# Patient Record
Sex: FEMALE | Race: BLACK OR AFRICAN AMERICAN | NOT HISPANIC OR LATINO | URBAN - METROPOLITAN AREA
[De-identification: names, ages, dates, MRNs, and addresses within clinical notes are randomized per-mention and may not be internally consistent; named-entity substitution may affect disease eponyms.]

---

## 2023-05-18 ENCOUNTER — APPOINTMENT (OUTPATIENT)
Dept: PRIMARY CARE | Facility: CLINIC | Age: 43
End: 2023-05-18
Payer: COMMERCIAL

## 2023-07-06 ENCOUNTER — OFFICE VISIT (OUTPATIENT)
Dept: PRIMARY CARE | Facility: CLINIC | Age: 43
End: 2023-07-06
Payer: COMMERCIAL

## 2023-07-06 VITALS
HEART RATE: 89 BPM | DIASTOLIC BLOOD PRESSURE: 87 MMHG | OXYGEN SATURATION: 99 % | HEIGHT: 66 IN | SYSTOLIC BLOOD PRESSURE: 126 MMHG | BODY MASS INDEX: 30.28 KG/M2 | TEMPERATURE: 97.8 F | WEIGHT: 188.4 LBS

## 2023-07-06 DIAGNOSIS — N93.9 VAGINAL BLEEDING: ICD-10-CM

## 2023-07-06 DIAGNOSIS — E11.65 TYPE 2 DIABETES MELLITUS WITH HYPERGLYCEMIA, WITH LONG-TERM CURRENT USE OF INSULIN (MULTI): ICD-10-CM

## 2023-07-06 DIAGNOSIS — I10 HYPERTENSION, UNSPECIFIED TYPE: ICD-10-CM

## 2023-07-06 DIAGNOSIS — Z79.4 TYPE 2 DIABETES MELLITUS WITH HYPERGLYCEMIA, WITH LONG-TERM CURRENT USE OF INSULIN (MULTI): ICD-10-CM

## 2023-07-06 DIAGNOSIS — Z13.5 DIABETIC RETINOPATHY SCREENING: Primary | ICD-10-CM

## 2023-07-06 DIAGNOSIS — K21.9 GASTROESOPHAGEAL REFLUX DISEASE WITHOUT ESOPHAGITIS: ICD-10-CM

## 2023-07-06 DIAGNOSIS — M17.0 PRIMARY OSTEOARTHRITIS OF BOTH KNEES: ICD-10-CM

## 2023-07-06 LAB
CLUE CELLS WET PREP: PRESENT
TRICH WET PREP: ABNORMAL
TRICHOMONAS REFLEX COMMENT: ABNORMAL
WBC WET PREP: ABNORMAL /HPF
YEAST PRESENCE WET PREP: ABNORMAL

## 2023-07-06 PROCEDURE — 3066F NEPHROPATHY DOC TX: CPT

## 2023-07-06 PROCEDURE — 3079F DIAST BP 80-89 MM HG: CPT

## 2023-07-06 PROCEDURE — 3074F SYST BP LT 130 MM HG: CPT

## 2023-07-06 PROCEDURE — 1036F TOBACCO NON-USER: CPT

## 2023-07-06 PROCEDURE — 3046F HEMOGLOBIN A1C LEVEL >9.0%: CPT

## 2023-07-06 PROCEDURE — 3008F BODY MASS INDEX DOCD: CPT

## 2023-07-06 PROCEDURE — 99214 OFFICE O/P EST MOD 30 MIN: CPT

## 2023-07-06 PROCEDURE — 87210 SMEAR WET MOUNT SALINE/INK: CPT

## 2023-07-06 RX ORDER — INSULIN GLARGINE 100 [IU]/ML
INJECTION, SOLUTION SUBCUTANEOUS
Qty: 3 ML | Refills: 11 | Status: SHIPPED | OUTPATIENT
Start: 2023-07-06 | End: 2024-03-26 | Stop reason: SDUPTHER

## 2023-07-06 RX ORDER — NAPROXEN SODIUM 220 MG/1
TABLET, FILM COATED ORAL
COMMUNITY
End: 2023-07-06 | Stop reason: SDUPTHER

## 2023-07-06 RX ORDER — L. ACIDOPHILUS/L.BULGARICUS 1MM CELL
TABLET ORAL
Qty: 30 TABLET | Refills: 11 | OUTPATIENT
Start: 2023-07-06 | End: 2024-05-07

## 2023-07-06 RX ORDER — PEN NEEDLE, DIABETIC 30 GX3/16"
NEEDLE, DISPOSABLE MISCELLANEOUS
Qty: 100 EACH | Refills: 11 | Status: SHIPPED | OUTPATIENT
Start: 2023-07-06 | End: 2024-03-26 | Stop reason: SDUPTHER

## 2023-07-06 RX ORDER — ACETAMINOPHEN 325 MG/1
325 TABLET ORAL
Qty: 30 TABLET | Refills: 2 | Status: SHIPPED | OUTPATIENT
Start: 2023-07-06

## 2023-07-06 RX ORDER — UBIQUINOL 100 MG
CAPSULE ORAL
Qty: 100 EACH | Refills: 11 | Status: SHIPPED | OUTPATIENT
Start: 2023-07-06 | End: 2024-03-26 | Stop reason: SDUPTHER

## 2023-07-06 RX ORDER — DULAGLUTIDE 1.5 MG/.5ML
1.5 INJECTION, SOLUTION SUBCUTANEOUS
Qty: 2 ML | Refills: 11 | Status: SHIPPED | OUTPATIENT
Start: 2023-07-06 | End: 2024-03-26 | Stop reason: SDUPTHER

## 2023-07-06 RX ORDER — UBIQUINOL 100 MG
CAPSULE ORAL
COMMUNITY
Start: 2023-06-19 | End: 2023-07-06 | Stop reason: SDUPTHER

## 2023-07-06 RX ORDER — AMLODIPINE BESYLATE 10 MG/1
10 TABLET ORAL
COMMUNITY
Start: 2023-05-21 | End: 2023-07-06 | Stop reason: SDUPTHER

## 2023-07-06 RX ORDER — ACETAMINOPHEN 325 MG/1
TABLET ORAL
COMMUNITY
Start: 2019-06-05 | End: 2023-07-06 | Stop reason: SDUPTHER

## 2023-07-06 RX ORDER — HYDROCHLOROTHIAZIDE 12.5 MG/1
12.5 TABLET ORAL DAILY
Qty: 30 TABLET | Refills: 11 | Status: SHIPPED | OUTPATIENT
Start: 2023-07-06 | End: 2024-03-26 | Stop reason: SDUPTHER

## 2023-07-06 RX ORDER — ATORVASTATIN CALCIUM 80 MG/1
80 TABLET, FILM COATED ORAL NIGHTLY
Qty: 90 TABLET | Refills: 3 | Status: SHIPPED | OUTPATIENT
Start: 2023-07-06 | End: 2023-07-21 | Stop reason: SDUPTHER

## 2023-07-06 RX ORDER — ATORVASTATIN CALCIUM 80 MG/1
80 TABLET, FILM COATED ORAL NIGHTLY
COMMUNITY
End: 2023-07-06 | Stop reason: SDUPTHER

## 2023-07-06 RX ORDER — PANTOPRAZOLE SODIUM 40 MG/1
40 TABLET, DELAYED RELEASE ORAL DAILY
Qty: 30 TABLET | Refills: 5 | OUTPATIENT
Start: 2023-07-06 | End: 2024-05-07

## 2023-07-06 RX ORDER — INSULIN GLARGINE 100 [IU]/ML
INJECTION, SOLUTION SUBCUTANEOUS
COMMUNITY
End: 2023-07-06 | Stop reason: SDUPTHER

## 2023-07-06 RX ORDER — METFORMIN HYDROCHLORIDE 500 MG/1
500 TABLET ORAL
Qty: 60 TABLET | Refills: 11 | Status: SHIPPED | OUTPATIENT
Start: 2023-07-06 | End: 2024-03-26 | Stop reason: SDUPTHER

## 2023-07-06 RX ORDER — HYDROCHLOROTHIAZIDE 12.5 MG/1
12.5 TABLET ORAL DAILY
COMMUNITY
End: 2023-07-06 | Stop reason: SDUPTHER

## 2023-07-06 RX ORDER — INSULIN LISPRO 100 [IU]/ML
INJECTION, SOLUTION INTRAVENOUS; SUBCUTANEOUS
COMMUNITY
End: 2023-07-06 | Stop reason: WASHOUT

## 2023-07-06 RX ORDER — L. ACIDOPHILUS/L.BULGARICUS 1MM CELL
TABLET ORAL
COMMUNITY
Start: 2023-05-21 | End: 2023-07-06 | Stop reason: SDUPTHER

## 2023-07-06 RX ORDER — AMLODIPINE BESYLATE 10 MG/1
10 TABLET ORAL
Qty: 30 TABLET | Refills: 11 | Status: SHIPPED | OUTPATIENT
Start: 2023-07-06 | End: 2024-03-26 | Stop reason: SDUPTHER

## 2023-07-06 RX ORDER — BLOOD-GLUCOSE SENSOR
EACH MISCELLANEOUS
Status: CANCELLED | OUTPATIENT
Start: 2023-07-06

## 2023-07-06 RX ORDER — ACETAMINOPHEN 325 MG/1
325 TABLET ORAL
Qty: 30 TABLET | Refills: 2 | Status: SHIPPED | OUTPATIENT
Start: 2023-07-06 | End: 2023-07-06 | Stop reason: SDUPTHER

## 2023-07-06 RX ORDER — NAPROXEN SODIUM 220 MG/1
TABLET, FILM COATED ORAL
Qty: 30 TABLET | Refills: 11 | Status: SHIPPED | OUTPATIENT
Start: 2023-07-06 | End: 2024-03-26 | Stop reason: SDUPTHER

## 2023-07-06 ASSESSMENT — ENCOUNTER SYMPTOMS
ACTIVITY CHANGE: 0
NAUSEA: 0
LIGHT-HEADEDNESS: 0
DIARRHEA: 0
HEMATURIA: 0
FREQUENCY: 0
FEVER: 0
GASTROINTESTINAL NEGATIVE: 1
DIZZINESS: 0
DYSURIA: 0

## 2023-07-06 ASSESSMENT — PAIN SCALES - GENERAL: PAINLEVEL: 0-NO PAIN

## 2023-07-06 NOTE — PROGRESS NOTES
"Subjective   Patient ID: Tracey Stephen is a 42 y.o. female who presents for Follow-up (Stroke), Vaginal Bleeding (Doesn't have Ovaries), and Med Refill (Wants a Dex com G7 for Blood sugar and Review meds).         # Hospital Follow Up  Discharge summary:    \"TRACEY STEPHEN is a 42 year old Female with PMH significant for HTN, uncontrolled T2DM on insulin, CKD, former smoker, recent CVA with mild residual left sided weakness (3 weeks ago at [place]) who presented to the ER with c/o right sided weakness 30 min prior to coming to the ER.  Denies any other associated symptoms.  Denies HA, lightheadedness/dizziness, fever, chills, nausea, vomiting, CP, palpitations, cough, wheezing, SOB, GOFF, orthopnea, abdominal pain, urinary complaints, diarrhea, constipation  Endorses she had left sided weakness, difficulty walking, and slurred speech 3 weeks ago, and was evaluated at [facility] and found to have an acute stroke. Since then she has quit smoking (prior 1.5 ppd for > 20 years).  Admits she has not been taking lantus, and report she needs a new glucometer.  Reports she did have an ECHO done at [place] 3 weeks ago, will need to reach out to get the records.  Currently is on continuos cardiac monitor until 7/27/23 she was ruled out for stroke, in regards to patient's hyperglycemia increase her Lantus to 20 units twice a day, encouraged compliance.  Patient was stable at the time of discharge.\"    Hospital Follow up  - admitted at 6/14-6/20   - since discharge, well HH and rehab  - reports minimal missed medication doses and needs refill on all discharge medication list.      #Vaginal Bleeding   -sp hysterectomy 2009  -daily brown discharge for a \"year\"   -Evaluated at [facility] for vaginal bleeding and treated for trichamonas,   -abnormal brown discharge continued  -denies malodorous, irritation, lesions, use of feminine hygiene sprays, pruritus      SH:   Tobacco: Former smoker, quit 2 months ago. (14 " "ppd)  Denies alcohol use  Cannabis 4-5x per day      Review of Systems   Constitutional:  Negative for activity change and fever.   HENT:  Negative for congestion.    Gastrointestinal: Negative.  Negative for diarrhea and nausea.   Genitourinary:  Positive for vaginal bleeding. Negative for dysuria, frequency and hematuria.   Neurological:  Negative for dizziness and light-headedness.       Objective   /87 (BP Location: Left arm, Patient Position: Sitting, BP Cuff Size: Adult)   Pulse 89   Temp 36.6 °C (97.8 °F) (Temporal)   Ht 1.664 m (5' 5.5\")   Wt 85.5 kg (188 lb 6.4 oz)   SpO2 99%   BMI 30.87 kg/m²     Physical Exam  Constitutional:       General: She is not in acute distress.     Appearance: She is obese.      Comments: In wheel chair   HENT:      Head: Normocephalic and atraumatic.      Nose: Nose normal.   Eyes:      Extraocular Movements: Extraocular movements intact.      Conjunctiva/sclera: Conjunctivae normal.   Cardiovascular:      Rate and Rhythm: Normal rate and regular rhythm.   Pulmonary:      Effort: Pulmonary effort is normal.   Abdominal:      General: Bowel sounds are normal. There is no distension.      Palpations: Abdomen is soft.      Tenderness: There is no abdominal tenderness.   Skin:     General: Skin is warm and dry.      Capillary Refill: Capillary refill takes 2 to 3 seconds.   Neurological:      General: No focal deficit present.      Mental Status: She is alert.   Psychiatric:         Mood and Affect: Mood normal.         Behavior: Behavior normal.         Assessment/Plan     JAKY MILLAN is a 42 year old Female with PMH significant for HTN, uncontrolled T2DM on insulin, CKD, former smoker, recent CVA with mild residual left sided weakness (3 weeks ago at [place]) who presented for hospital follow up, medication refill, referrals, and complaint of abnormal vaginal brown bleeding/discharge for over 1 year. Patient has been doing well since discharge from the hospital. " "Most of appointment was spent refilling all of her medications because she was out. Her complaint of vaginal discharge s/p hysterectomy in 2009 has been evaluated by  OB/GYN in 1/2023 and [facility] 5/31/23 for which, through chart review patient positive for BV (Neg GC). Obtained wet prep swab IO and ordered TVUS. Ddx includes: vaginal atrophy, infection, vaginal cancer, fistula? Patient to follow up in 3-4 weeks to follow up on symptoms, lab result, and image.    Diagnoses and all orders for this visit:  Diabetic retinopathy screening  -     Referral to Ophthalmology; Future  Type 2 diabetes mellitus with hyperglycemia, with long-term current use of insulin (CMS/[facility])  -     Referral to Podiatry; Future  -     Alcohol Prep Pads pads, medicated; USE TOPICALLY PRIOR TO TESTING OR INJECTING INSULIN.  -     Continuous glucose monitoring  -     atorvastatin (Lipitor) 80 mg tablet; Take 1 tablet (80 mg) by mouth once daily at bedtime.  -     Basaglar KwikPen U-100 Insulin 100 unit/mL (3 mL) pen; 20 UNIT(S) SUBCUTANEOUS 2 TIMES A DAY  -     pen needle, diabetic 31 gauge x 5/16\" needle; Use to inject 1-4 times daily as directed.  -     blood sugar diagnostic strip; 2 (two) times daily before a meal. Give strips corresponding to meter given.  -     metFORMIN (Glucophage) 500 mg tablet; Take 1 tablet (500 mg) by mouth 2 times a day with meals.  -     dulaglutide (Trulicity) 1.5 mg/0.5 mL pen injector; Inject 1.5 mg under the skin 1 (one) time per week.  Vaginal bleeding  -     Wet Prep with Trichomonas Reflex If Neg; Future  -     US pelvis transvaginal; Future  -    Perform spec exam if indicated. Unable to perform this visit d/t time constraints.   Hypertension, unspecified type  -     amLODIPine (Norvasc) 10 mg tablet; Take 1 tablet (10 mg) by mouth once daily.  -     aspirin 81 mg chewable tablet; TAKE 1 TABLET BY ORAL/FEEDING TUBE ROUTE ONCE DAILY.  -     hydroCHLOROthiazide (HYDRODiuril) 12.5 mg tablet; Take 1 tablet (12.5 " mg) by mouth once daily.  Gastroesophageal reflux disease without esophagitis  -     Floranex 1 million cell tablet tablet; USE AS DIRECTED.  -     pantoprazole (ProtoNix) 40 mg EC tablet; Take 1 tablet (40 mg) by mouth once daily. Do not crush, chew, or split.  Primary osteoarthritis of both knees  -     acetaminophen (Tylenol) 325 mg tablet; Take 1 tablet (325 mg) by mouth every 4 hours.      RTC in 3-4 weeks for follow up vaginal bleeding     Discussed and evaluated with Dr. Ani Fernandez,  PGY2

## 2023-07-07 DIAGNOSIS — N76.0 BACTERIAL VAGINAL INFECTION: Primary | ICD-10-CM

## 2023-07-07 DIAGNOSIS — B37.31 YEAST VAGINITIS: ICD-10-CM

## 2023-07-07 DIAGNOSIS — B96.89 BACTERIAL VAGINAL INFECTION: Primary | ICD-10-CM

## 2023-07-07 LAB — TRICHOMONAS VAGINALIS: NORMAL

## 2023-07-07 RX ORDER — METRONIDAZOLE 500 MG/1
500 TABLET ORAL 2 TIMES DAILY
Qty: 14 TABLET | Refills: 0 | Status: SHIPPED | OUTPATIENT
Start: 2023-07-07 | End: 2023-07-14

## 2023-07-07 RX ORDER — FLUCONAZOLE 150 MG/1
150 TABLET ORAL ONCE
Qty: 1 TABLET | Refills: 0 | Status: SHIPPED | OUTPATIENT
Start: 2023-07-07 | End: 2023-07-07

## 2023-07-07 NOTE — PROGRESS NOTES
I saw and evaluated the patient. I personally obtained the key and critical portions of the history and physical exam or was physically present for key and critical portions performed by the resident/fellow. I reviewed the resident/fellow's documentation and discussed the patient with the resident/fellow. I agree with the resident/fellow's medical decision making as documented in the note.    Mahendra Law MD

## 2023-07-07 NOTE — PROGRESS NOTES
Called patient 7/7/2023 regarding lab value indicated of BV. Patient understands Metronidazole 500 mg twice daily for 7 days will be sent to pharmacy. Patient request treatment for yeast infection since happens when she takes antibiotics. Ordered 1 tablet of fluconazole 150 mg. Patient to call clinic to schedule f/up appt after TVUS.

## 2023-07-21 ENCOUNTER — OFFICE VISIT (OUTPATIENT)
Dept: PRIMARY CARE | Facility: CLINIC | Age: 43
End: 2023-07-21
Payer: COMMERCIAL

## 2023-07-21 VITALS
OXYGEN SATURATION: 99 % | HEIGHT: 65 IN | HEART RATE: 98 BPM | TEMPERATURE: 95.6 F | RESPIRATION RATE: 18 BRPM | SYSTOLIC BLOOD PRESSURE: 123 MMHG | WEIGHT: 192 LBS | DIASTOLIC BLOOD PRESSURE: 85 MMHG | BODY MASS INDEX: 31.99 KG/M2

## 2023-07-21 DIAGNOSIS — I10 HYPERTENSION, UNSPECIFIED TYPE: ICD-10-CM

## 2023-07-21 DIAGNOSIS — Z79.4 TYPE 2 DIABETES MELLITUS WITH HYPERGLYCEMIA, WITH LONG-TERM CURRENT USE OF INSULIN (MULTI): Primary | ICD-10-CM

## 2023-07-21 DIAGNOSIS — L85.3 XEROSIS OF SKIN: ICD-10-CM

## 2023-07-21 DIAGNOSIS — E11.65 TYPE 2 DIABETES MELLITUS WITH HYPERGLYCEMIA, WITH LONG-TERM CURRENT USE OF INSULIN (MULTI): Primary | ICD-10-CM

## 2023-07-21 PROCEDURE — 3079F DIAST BP 80-89 MM HG: CPT

## 2023-07-21 PROCEDURE — 3066F NEPHROPATHY DOC TX: CPT

## 2023-07-21 PROCEDURE — 99213 OFFICE O/P EST LOW 20 MIN: CPT

## 2023-07-21 PROCEDURE — 1036F TOBACCO NON-USER: CPT

## 2023-07-21 PROCEDURE — 3046F HEMOGLOBIN A1C LEVEL >9.0%: CPT

## 2023-07-21 PROCEDURE — 3074F SYST BP LT 130 MM HG: CPT

## 2023-07-21 PROCEDURE — 3008F BODY MASS INDEX DOCD: CPT

## 2023-07-21 RX ORDER — ATORVASTATIN CALCIUM 80 MG/1
80 TABLET, FILM COATED ORAL NIGHTLY
Qty: 90 TABLET | Refills: 3 | Status: SHIPPED | OUTPATIENT
Start: 2023-07-21 | End: 2024-03-26 | Stop reason: SDUPTHER

## 2023-07-21 ASSESSMENT — ENCOUNTER SYMPTOMS
CONSTIPATION: 0
PALPITATIONS: 0
COUGH: 0
CHILLS: 0
DIARRHEA: 0
RHINORRHEA: 0
NAUSEA: 0
HEADACHES: 0
WOUND: 0
DIZZINESS: 0
FEVER: 0
LIGHT-HEADEDNESS: 0
SHORTNESS OF BREATH: 0

## 2023-07-21 ASSESSMENT — PAIN SCALES - GENERAL: PAINLEVEL: 0-NO PAIN

## 2023-07-21 NOTE — PROGRESS NOTES
"Subjective   Patient ID: Tracey Stephen is a 42 y.o. female who presents for Diabetes (Itching all over ).    HPI   # Diabetes  - Interval hx:   - Current regimen: Metformin 500 mg BID, Basaglar Kwikpen 20 U twice a day, Trulicy once 1.5 mg every Friday. Next Endo Aug 14th  - Injection: Abdomen. Alternating sites of injection  - Home Glucose Monitoring: TID per day  - Home Glucose ranges: 202, 147, 239, 141, 134  - Hypoglycemic episodes: denies  - Diet: reduced carb intake, need to get on SNAP  - Diabetes Surveillance: last HbA1C   Lab Results   Component Value Date    HGBA1C 13.8 (A) 06/15/2023     - Diabetes Complications:   - Vaccinations:   - Unrine Albumin:   - Foot Exam: has not seen a podiatrist  - Eye Exam:   -Mouth Exam:   - Denies any polyuria, polydipsia, dysuria, fatigue, weight loss, changes in vision, changes in bowels, HA dizziness, lightheadedness, or weakness.    141/79, 125/85, 120/87    #Itchy Skin  -whole body 2 months   -no rash  -no changes in body wash or detergent    Review of Systems   Constitutional:  Negative for chills and fever.   HENT:  Negative for rhinorrhea and sneezing.    Respiratory:  Negative for cough and shortness of breath.    Cardiovascular:  Negative for chest pain and palpitations.   Gastrointestinal:  Negative for constipation, diarrhea and nausea.   Skin:  Negative for rash and wound.   Allergic/Immunologic: Negative for environmental allergies and food allergies.   Neurological:  Negative for dizziness, light-headedness and headaches.       Objective   /85 (BP Location: Right arm, Patient Position: Sitting, BP Cuff Size: Adult)   Pulse 98   Temp 35.3 °C (95.6 °F) (Temporal)   Resp 18   Ht 1.651 m (5' 5\")   Wt 87.1 kg (192 lb)   SpO2 99%   BMI 31.95 kg/m²     Physical Exam  Constitutional:       General: She is not in acute distress.     Appearance: She is obese. She is not toxic-appearing.   HENT:      Head: Normocephalic and atraumatic.      Nose: Nose " normal.      Mouth/Throat:      Mouth: Mucous membranes are moist.   Eyes:      Extraocular Movements: Extraocular movements intact.      Conjunctiva/sclera: Conjunctivae normal.   Cardiovascular:      Rate and Rhythm: Normal rate and regular rhythm.      Heart sounds: No murmur heard.  Pulmonary:      Effort: Pulmonary effort is normal.      Breath sounds: Normal breath sounds. No wheezing or rhonchi.   Abdominal:      General: Bowel sounds are normal. There is no distension.      Palpations: Abdomen is soft.      Tenderness: There is no abdominal tenderness.   Skin:     General: Skin is warm and dry.      Coloration: Skin is not jaundiced.      Findings: No erythema, lesion or rash.   Neurological:      Mental Status: She is alert.   Psychiatric:         Mood and Affect: Mood normal.         Behavior: Behavior normal.          Assessment/Plan   Patient is a 42 year old Female with PMH significant for HTN, uncontrolled T2DM on insulin, CKD, former smoker, recent CVA with mild residual left sided weakness ( [place]) who presented for follow up on DM, HTN, and dry skin for two weeks. Patient has complex medical history and on many medications. She reports adherence but upon interview, there was some confusion on her medications. For instance, on discharge medication paperwork from last visit did not list short acting insulin, but notes she recent found another insulin in refrigerator that she was suppose to take before meals, but has not. Her sugars are not at goal. Additionally, she was taking Chlorthalidone 25mg daily and hydrochlorothiazide 12.5 daily. Not sure if it was discontinued and started on hydrochlorothiazide from last hospital stay. Patient to be evaluated by Endocrinology next month. Patient to follow up at the  in month as well to follow up on DM and HTN.      Diagnoses and all orders for this visit:  Type 2 diabetes mellitus with hyperglycemia, with long-term current use of insulin (CMS/[facility])  -      Referral to Podiatry; Future  -     Referral to Ophthalmology; Future  Hypertension, unspecified type  -     atorvastatin (Lipitor) 80 mg tablet; Take 1 tablet (80 mg) by mouth once daily at bedtime.  Xerosis of the skin        - upon PE, dry skin. No inflammation, rash, excoriation, macules/plaques         - CMP on 6/14/23 total bili wnl       -  discussed avoidance of skin irritants and fragrance free moisturizers     RTC in 1 month to follow up on DM and HTN    D/w Dr. José Miguel Fernandez, DO PGY2

## 2023-07-21 NOTE — PATIENT INSTRUCTIONS
It was so great to see you today Tracey Stephen  . Today we discussed:    -make an appointment with podiatry and eye doctor, call number listed below  -Follow up with Endocrinology  -Stop taking Chlorthalidone  -Follow up in one month for blood pressure check     Call [phone]  For Care if you need to schedule appointment with specialist or images.    Follow up in       You were see by:    Dr. Kecia Fernandez D.O.  [facility]   Phone: [phone]

## 2023-07-28 NOTE — PROGRESS NOTES
I reviewed with the resident the medical history and the resident’s findings on physical examination.  I discussed with the resident the patient’s diagnosis and concur with the treatment plan as documented in the resident note.     Rachel Valdez MD

## 2023-08-31 ENCOUNTER — OFFICE VISIT (OUTPATIENT)
Dept: PRIMARY CARE | Facility: CLINIC | Age: 43
End: 2023-08-31
Payer: COMMERCIAL

## 2023-08-31 VITALS
SYSTOLIC BLOOD PRESSURE: 159 MMHG | WEIGHT: 190.6 LBS | RESPIRATION RATE: 18 BRPM | HEIGHT: 65 IN | BODY MASS INDEX: 31.75 KG/M2 | DIASTOLIC BLOOD PRESSURE: 99 MMHG | OXYGEN SATURATION: 96 % | TEMPERATURE: 98.1 F | HEART RATE: 98 BPM

## 2023-08-31 DIAGNOSIS — J30.81 CAT ALLERGY, AIRBORNE: Primary | ICD-10-CM

## 2023-08-31 DIAGNOSIS — Z79.4 TYPE 2 DIABETES MELLITUS WITH HYPERGLYCEMIA, WITH LONG-TERM CURRENT USE OF INSULIN (MULTI): ICD-10-CM

## 2023-08-31 DIAGNOSIS — E11.65 TYPE 2 DIABETES MELLITUS WITH HYPERGLYCEMIA, WITH LONG-TERM CURRENT USE OF INSULIN (MULTI): ICD-10-CM

## 2023-08-31 DIAGNOSIS — I63.9 CEREBROVASCULAR ACCIDENT (CVA), UNSPECIFIED MECHANISM (MULTI): ICD-10-CM

## 2023-08-31 PROCEDURE — 3066F NEPHROPATHY DOC TX: CPT

## 2023-08-31 PROCEDURE — 3046F HEMOGLOBIN A1C LEVEL >9.0%: CPT

## 2023-08-31 PROCEDURE — 3077F SYST BP >= 140 MM HG: CPT

## 2023-08-31 PROCEDURE — 3080F DIAST BP >= 90 MM HG: CPT

## 2023-08-31 PROCEDURE — 1036F TOBACCO NON-USER: CPT

## 2023-08-31 PROCEDURE — 99214 OFFICE O/P EST MOD 30 MIN: CPT

## 2023-08-31 PROCEDURE — 3008F BODY MASS INDEX DOCD: CPT

## 2023-08-31 RX ORDER — LORATADINE 10 MG/1
10 TABLET ORAL DAILY
Qty: 30 TABLET | Refills: 5 | Status: SHIPPED | OUTPATIENT
Start: 2023-08-31 | End: 2024-02-27

## 2023-08-31 RX ORDER — BLOOD SUGAR DIAGNOSTIC
3 STRIP MISCELLANEOUS
Qty: 100 STRIP | Refills: 11 | Status: SHIPPED | OUTPATIENT
Start: 2023-08-31 | End: 2024-03-26 | Stop reason: SDUPTHER

## 2023-08-31 ASSESSMENT — PAIN SCALES - GENERAL: PAINLEVEL: 8

## 2023-08-31 NOTE — PATIENT INSTRUCTIONS
It was so great to see you today Tracey Stephen  . Today we discussed:    -signed Social Security paperwork and gave you a copy  -Ordered continued speech evaluation for history of stroke and residual facial weakness   -  -    Call [phone]  For Care if you need to schedule appointment with specialist or images.    Follow up in       You were see by:    Dr. Kecia Fernandez D.O.  [facility]   Phone: [phone]

## 2023-08-31 NOTE — PROGRESS NOTES
Subjective   Patient ID: Tracey Stephen is a 42 y.o. female who presents for follow on Abnormal Vaginal bleeding and the follow concerns listed below:     HPI     #Abnormal Vaginal Bleeding  -daily brown/red vaginal bleeding for 1yr. Wears 1 pad daily.   -s/p partial hysterectomy in 2009  -evaluated by GYN Dr. Hayley Cedillo at [facility] for which pelvic US obtained. Per PE PELVIC from OSH: external genitalia normal, normal Barth[place]'s glands, urethra, [place]'s glands, no vulvar lesions, good vaginal support, normal appearing perineal body and perianal region, well estrogenized, cervix surgically absent, vaginal discharge   -denies hematuria, malorderous vaginal discharge, pelvic pain  - exam normal, STD screenings unremarkable. S/p BV treatment  -CT Abd/pelvis requested addendum regarding female anatomy 8/3/23: Noted hysterectomy, Tiny dystrophic calcifications are seen near vaginal region similar to prior. Small symmetric soft tissue structures of adnexal regions likely represent to non enlarged ovaries, similar to prior. No adnexal mass or fluid collection identified.   -US pelvis US 6/16/23: ovaries unable to be visualized d/t overlying bowel gas. Noted surgically absent uterus and no pelvic free fluid.   -Causes distress because of full workup between  and [facility] w/o diagnosis as well as it effects patient's desire for sexual intercourse. Not sexually active at this time  - request Birth Control to see if it will stop vaginal bleeding    #H/o CVA with mild left side weakness  -patient request Speech Therapy renewed referral for continue therapy. Expresses continued left sided facial weakness      #Misc  -Patient request to complete Social Security Paperwork      Review of Systems   Constitutional:  Negative for appetite change, chills, fever and unexpected weight change.   HENT:  Negative for congestion, ear discharge, rhinorrhea and sneezing.    Eyes:  Negative for discharge, itching and visual  "disturbance.   Respiratory:  Negative for cough, shortness of breath and wheezing.    Cardiovascular:  Negative for chest pain, palpitations and leg swelling.   Gastrointestinal:  Negative for abdominal pain, constipation, diarrhea, nausea and vomiting.   Endocrine: Negative for cold intolerance and heat intolerance.   Genitourinary:  Positive for vaginal bleeding. Negative for dysuria, frequency, hematuria, urgency, vaginal discharge and vaginal pain.   Musculoskeletal:  Negative for joint swelling and myalgias.   Skin:  Negative for rash and wound.   Neurological:  Negative for dizziness, light-headedness and headaches.   Psychiatric/Behavioral:  Negative for confusion, sleep disturbance and suicidal ideas.        Objective   BP (!) 159/99 (BP Location: Right arm, Patient Position: Sitting, BP Cuff Size: Adult)   Pulse 98   Temp 36.7 °C (98.1 °F) (Temporal)   Resp 18   Ht 1.651 m (5' 5\")   Wt 86.5 kg (190 lb 9.6 oz)   SpO2 96%   BMI 31.72 kg/m²     Physical Exam  Constitutional:       General: She is not in acute distress.     Appearance: She is obese. She is not ill-appearing.   HENT:      Head: Normocephalic and atraumatic.      Nose: Nose normal.   Eyes:      Extraocular Movements: Extraocular movements intact.      Conjunctiva/sclera: Conjunctivae normal.   Cardiovascular:      Rate and Rhythm: Normal rate and regular rhythm.      Heart sounds: No murmur heard.  Pulmonary:      Effort: Pulmonary effort is normal.      Breath sounds: Normal breath sounds.   Abdominal:      General: Bowel sounds are normal. There is no distension.      Palpations: Abdomen is soft. There is no mass.      Tenderness: There is no abdominal tenderness.      Hernia: No hernia is present.   Musculoskeletal:         General: Normal range of motion.   Skin:     General: Skin is warm and dry.   Neurological:      General: No focal deficit present.      Mental Status: She is alert and oriented to person, place, and time. " "  Psychiatric:         Mood and Affect: Mood normal.         Behavior: Behavior normal.         Assessment/Plan   JAKY MILLAN is a 42 year old Female with PMH significant for HTN, uncontrolled T2DM on insulin, CKD, former smoker, recent CVA with mild residual left sided weakness, who presented for continued abnormal vaginal bleeding for 1 year duration. Has been evaluated by GYN at [facility] as well as at  for complaint. Patient s/p partial hysterectomy in 2009.  physical exam per chart biopsy by GYN Dr. Mamadou CAMARENA on 5/31/23 negative and images (TVUS & CT) negative. No clear picture at this point and patient has an upcoming visit with  GYN for evaluation, may need laparoscopic view of abd/pelvis. Ddx atrophic vaginitis, bleeding disorder, PCOS. Patient requesting birth control for vaginal bleeding since she desires sexual intercourse. Has not been sexually active for longtime. Discussed with patient that since etiology unknown for vaginal bleeding, did not have a clear indication for birth control Rx,  low success for desired results, and her risk factors for AE of OCPs (age/smoking history/HTN/CVA).     Diagnoses and all orders for this visit:  Abnormal Vaginal Bleeding       -s/p partial hysterectomy in 2009       -Continued daily brown/red vaginal bleeding for 1yr. Wears 1 pad daily.        -evaluated by GYN Dr. Hayley Cedillo at [facility]         --Per PE PELVIC from OSH: \"external genitalia normal, normal Bartholin's glands, urethra, [place]'s glands, no vulvar lesions, good vaginal support, normal appearing perineal body and perianal region, well estrogenized, cervix surgically absent, vaginal discharge\"        -denies hematuria, malorderous vaginal discharge, pelvic pain, pruritus       - STD screenings unremarkable. S/p BV treatment      -CBC & TSH wnl      -CT Abd/pelvis requested addendum regarding female anatomy 8/3/23: Noted hysterectomy, Tiny dystrophic calcifications are seen " near vaginal region similar to prior. Small symmetric soft tissue structures of adnexal regions likely represent to non enlarged ovaries, similar to prior. No adnexal mass or fluid collection identified.       -US pelvis US 6/16/23: ovaries unable to be visualized d/t overlying bowel gas. Noted surgically absent uterus and no pelvic free fluid.       -Encouraged [facility] GYN appointment scheduled in two months for evaluation. Did not provide contraceptives d/t unsure nature of vaginal bleeding  Cat allergy, airborne  -     loratadine (Claritin) 10 mg tablet; Take 1 tablet (10 mg) by mouth once daily.  Cerebrovascular accident (CVA), unspecified mechanism (CMS/HCC)  -     Referral to Speech Therapy; Future  Type 2 diabetes mellitus with hyperglycemia, with long-term current use of insulin (CMS/HCC)  -     OneTouch Ultra Test strip; 3 each before breakfast, before lunch, before evening meal, and at bedtime.     Discussed and evaluated with Dr. Héctor Fernandez, DO PGY2

## 2023-09-01 ASSESSMENT — ENCOUNTER SYMPTOMS
PALPITATIONS: 0
DIARRHEA: 0
MYALGIAS: 0
JOINT SWELLING: 0
EYE DISCHARGE: 0
CHILLS: 0
LIGHT-HEADEDNESS: 0
RHINORRHEA: 0
DYSURIA: 0
COUGH: 0
DIZZINESS: 0
CONSTIPATION: 0
SHORTNESS OF BREATH: 0
SLEEP DISTURBANCE: 0
UNEXPECTED WEIGHT CHANGE: 0
NAUSEA: 0
WHEEZING: 0
VOMITING: 0
APPETITE CHANGE: 0
ABDOMINAL PAIN: 0
CONFUSION: 0
HEMATURIA: 0
FREQUENCY: 0
WOUND: 0
EYE ITCHING: 0
FEVER: 0
HEADACHES: 0

## 2023-09-03 NOTE — PROGRESS NOTES
I saw and evaluated the patient. I personally obtained the key and critical portions of the history and physical exam or was physically present for key and critical portions performed by the resident/fellow. I reviewed the resident/fellow's documentation and discussed the patient with the resident/fellow. I agree with the resident/fellow's medical decision making as documented in the note.    Conner Anaya MD

## 2023-12-12 NOTE — PROGRESS NOTES
INITIAL EVALUATION    HISTORY OF PRESENT ILLNESS:   Tracey Stephen is a 43 y.o. female who presents as a new patient today with abnormal vaginal bleeding/discharge.      She is here today as she has had bleeding and foul smelling discharge for the last year. She is sexually active and explains after she has intercourse the bleeding stops but the odor is still there. She does notice a white discharge occasionally and notes that she has a strong family history of yeast infections. She does not suspect an STI but would  like testing just in case.    She did have hysterectomy in 2018 for painful periods and fibroids.    Hx of stroke in May 2023, diabetic and is working to control it. She has been done with rehab for about 6 months. She still walks with a cane. Still smokes, she did stop at one point but began missing it and restarted.       I  PAST MEDICAL HISTORY:  Past Medical History:   Diagnosis Date    Acute candidiasis of vulva and vagina 06/08/2015    Vaginitis due to Candida albicans    Acute vaginitis 02/06/2015    Bacterial vaginosis    Anxiety disorder, unspecified     Anxiety    Bipolar disorder, unspecified (CMS/[facility])     Bipolar disorder    Dysmenorrhea, unspecified 01/04/2016    Menstrual cramp    Encounter for follow-up examination after completed treatment for conditions other than malignant neoplasm 01/31/2018    Postoperative examination    Encounter for other general counseling and advice on contraception 05/09/2017    Birth control counseling    Encounter for screening for malignant neoplasm of cervix 03/21/2017    Cervical cancer screening    Mastitis without abscess 02/26/2018    Cellulitis of left breast    Obesity, unspecified 02/26/2018    Class 1 obesity    Personal history of other diseases of the female genital tract 07/25/2017    History of abnormal uterine bleeding    Personal history of other endocrine, nutritional and metabolic disease 12/08/2015    History of type 2 diabetes mellitus     Personal history of other infectious and parasitic diseases 05/09/2017    History of trichomoniasis    Personal history of other specified conditions 01/31/2018    History of urinary incontinence    Personal history of other specified conditions 07/25/2017    History of urinary incontinence    Post-traumatic stress disorder, unspecified     PTSD (post-traumatic stress disorder)    Type 2 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 (CMS/HCC) 08/07/2018    DM (diabetes mellitus), type 2 with neurological complications    Unspecified urinary incontinence     Bladder leak       PAST SURGICAL HISTORY:  Past Surgical History:   Procedure Laterality Date    BREAST SURGERY  09/02/2015    Breast Surgery Reduction Procedure    MR HEAD ANGIO WO IV CONTRAST  6/15/2023    MR HEAD ANGIO WO IV CONTRAST 6/15/2023 [facility]    MR NECK ANGIO WO IV CONTRAST  6/15/2023    MR NECK ANGIO WO IV CONTRAST 6/15/2023 [facility] MRI    OTHER SURGICAL HISTORY  01/31/2018    Laparoscopy With Total Hysterectomy For Uterus > 250g    OTHER SURGICAL HISTORY  01/31/2018    Oviductal Surgery Bilateral Salpingectomy    TUBAL LIGATION  09/02/2015    Tubal Ligation        ALLERGIES:   Allergies   Allergen Reactions    Hay Fever And Allergy Relief Itching        MEDICATIONS:   [unfilled]     SOCIAL HISTORY:  Patient  reports that she has never smoked. She has never used smokeless tobacco. She reports current drug use. Drug: Marijuana. She reports that she does not drink alcohol.   Social History     Socioeconomic History    Marital status: Single     Spouse name: Not on file    Number of children: Not on file    Years of education: Not on file    Highest education level: Not on file   Occupational History    Not on file   Tobacco Use    Smoking status: Never    Smokeless tobacco: Never   Substance and Sexual Activity    Alcohol use: Never     Comment: Socil    Drug use: Yes     Types: Marijuana    Sexual activity: Not on file   Other Topics  Concern    Not on file   Social History Narrative    Not on file     Social Determinants of Health     Financial Resource Strain: Not on file   Food Insecurity: Not on file   Transportation Needs: Not on file   Physical Activity: Not on file   Stress: Not on file   Social Connections: Not on file   Intimate Partner Violence: Not on file   Housing Stability: Not on file       FAMILY HISTORY:  No family history on file.    REVIEW OF SYSTEMS:  Constitutional: Negative for fever and chills. Denies anorexia, weight loss.  Eyes: Negative for visual disturbance.   Respiratory: Negative for shortness of breath.    Cardiovascular: Negative for chest pain.   Gastrointestinal: Negative for nausea and vomiting.   Genitourinary: See interval history above.  Skin: Negative for rash.   Neurological: Negative for dizziness and numbness.   Psychiatric/Behavioral: Negative for confusion and decreased concentration.     PHYSICAL EXAM:  Blood pressure (!) 174/101, pulse 87, temperature 36.1 °C (97 °F), weight 85.3 kg (188 lb 1.6 oz).    External female genitalia is normal    There are no lesions on vulva, labia major or the labia minora     The clitoral prepuce is normal     The urethra is also normal     Speculum exam done gently and no lesions or abnormal discharge noted     Pap smear of the cuff completed      Bimanual exam done and adnexa are nontender and without masses appreciated     Thin, yellowish discharge. Nothing abnormal appearing, no blood or granulation tissue. Everything appears normal.       Assessment:        Tracey Stephen is a 43 y.o. female with abnormal vaginal bleeding    We collected a pap smear of her vaginal cuff today to be sent to pathology. She also wanted to be tested for a full STI panel so we will send that off for her as well.      Plan:   -Pap and STI panel sent to pathology     Follow up will be scheduled appropriately.    Scribe Attestation  By signing my name below, Jodi DELAROSA , Max pedrazaest  that this documentation has been prepared under the direction and in the presence of Ana Gracia MD MPH.

## 2023-12-13 ENCOUNTER — OFFICE VISIT (OUTPATIENT)
Dept: UROLOGY | Facility: HOSPITAL | Age: 43
End: 2023-12-13
Payer: COMMERCIAL

## 2023-12-13 VITALS
WEIGHT: 188.1 LBS | TEMPERATURE: 97 F | HEART RATE: 87 BPM | SYSTOLIC BLOOD PRESSURE: 174 MMHG | BODY MASS INDEX: 31.3 KG/M2 | DIASTOLIC BLOOD PRESSURE: 101 MMHG

## 2023-12-13 DIAGNOSIS — Z11.3 SCREEN FOR STD (SEXUALLY TRANSMITTED DISEASE): ICD-10-CM

## 2023-12-13 DIAGNOSIS — Z01.419 PAP SMEAR, AS PART OF ROUTINE GYNECOLOGICAL EXAMINATION: ICD-10-CM

## 2023-12-13 PROCEDURE — 87624 HPV HI-RISK TYP POOLED RSLT: CPT | Performed by: OBSTETRICS & GYNECOLOGY

## 2023-12-13 PROCEDURE — 3008F BODY MASS INDEX DOCD: CPT | Performed by: OBSTETRICS & GYNECOLOGY

## 2023-12-13 PROCEDURE — 99205 OFFICE O/P NEW HI 60 MIN: CPT | Performed by: OBSTETRICS & GYNECOLOGY

## 2023-12-13 PROCEDURE — 88142 CYTOPATH C/V THIN LAYER: CPT | Mod: TC,GCY | Performed by: OBSTETRICS & GYNECOLOGY

## 2023-12-13 PROCEDURE — 99215 OFFICE O/P EST HI 40 MIN: CPT | Mod: 25 | Performed by: OBSTETRICS & GYNECOLOGY

## 2023-12-13 PROCEDURE — 87661 TRICHOMONAS VAGINALIS AMPLIF: CPT | Mod: 59 | Performed by: OBSTETRICS & GYNECOLOGY

## 2023-12-13 PROCEDURE — 87800 DETECT AGNT MULT DNA DIREC: CPT | Performed by: OBSTETRICS & GYNECOLOGY

## 2023-12-13 PROCEDURE — 1036F TOBACCO NON-USER: CPT | Performed by: OBSTETRICS & GYNECOLOGY

## 2023-12-15 LAB
C TRACH RRNA SPEC QL NAA+PROBE: NEGATIVE
N GONORRHOEA DNA SPEC QL PROBE+SIG AMP: NEGATIVE
T VAGINALIS RRNA SPEC QL NAA+PROBE: NEGATIVE

## 2023-12-21 LAB
CYTOLOGY CMNT CVX/VAG CYTO-IMP: NORMAL
HPV HR 12 DNA GENITAL QL NAA+PROBE: POSITIVE
HPV HR GENOTYPES PNL CVX NAA+PROBE: POSITIVE
HPV16 DNA SPEC QL NAA+PROBE: NEGATIVE
HPV18 DNA SPEC QL NAA+PROBE: NEGATIVE
LAB AP HPV GENOTYPE QUESTION: YES
LAB AP HPV HR: NORMAL
LAB AP PAP ADDITIONAL TESTS: NORMAL
LABORATORY COMMENT REPORT: NORMAL
LABORATORY COMMENT REPORT: NORMAL
PATH REPORT.TOTAL CANCER: NORMAL

## 2023-12-26 ENCOUNTER — OFFICE VISIT (OUTPATIENT)
Dept: OPHTHALMOLOGY | Facility: CLINIC | Age: 43
End: 2023-12-26
Payer: COMMERCIAL

## 2023-12-26 DIAGNOSIS — E11.65 TYPE 2 DIABETES MELLITUS WITH HYPERGLYCEMIA, WITH LONG-TERM CURRENT USE OF INSULIN (MULTI): ICD-10-CM

## 2023-12-26 DIAGNOSIS — H52.4 PRESBYOPIA: ICD-10-CM

## 2023-12-26 DIAGNOSIS — H52.223 REGULAR ASTIGMATISM OF BOTH EYES: ICD-10-CM

## 2023-12-26 DIAGNOSIS — H25.13 NUCLEAR SCLEROTIC CATARACT OF BOTH EYES: ICD-10-CM

## 2023-12-26 DIAGNOSIS — Z79.4 TYPE 2 DIABETES MELLITUS WITH HYPERGLYCEMIA, WITH LONG-TERM CURRENT USE OF INSULIN (MULTI): ICD-10-CM

## 2023-12-26 DIAGNOSIS — H52.13 MYOPIA, BILATERAL: ICD-10-CM

## 2023-12-26 DIAGNOSIS — Z83.511 FAMILY HISTORY OF GLAUCOMA: ICD-10-CM

## 2023-12-26 DIAGNOSIS — E11.3219: Primary | ICD-10-CM

## 2023-12-26 PROCEDURE — 92004 COMPRE OPH EXAM NEW PT 1/>: CPT | Performed by: OPHTHALMOLOGY

## 2023-12-26 PROCEDURE — 92015 DETERMINE REFRACTIVE STATE: CPT | Performed by: OPHTHALMOLOGY

## 2023-12-26 ASSESSMENT — REFRACTION_MANIFEST
OD_CYLINDER: -1.75
METHOD_AUTOREFRACTION: 1
OS_AXIS: 118
OS_CYLINDER: -1.50
OS_SPHERE: -0.75
OS_AXIS: 118
OD_AXIS: 065
OS_SPHERE: -0.25
OD_SPHERE: -0.50
OS_ADD: +1.50
OS_CYLINDER: -1.25
OD_ADD: +1.50
OD_SPHERE: -1.00
OD_AXIS: 065
OD_CYLINDER: -1.50

## 2023-12-26 ASSESSMENT — TONOMETRY
OS_IOP_MMHG: 13
OD_IOP_MMHG: 12
IOP_METHOD: GOLDMANN APPLANATION

## 2023-12-26 ASSESSMENT — EXTERNAL EXAM - LEFT EYE: OS_EXAM: NORMAL

## 2023-12-26 ASSESSMENT — CUP TO DISC RATIO
OS_RATIO: 0.35
OD_RATIO: 0.35

## 2023-12-26 ASSESSMENT — EXTERNAL EXAM - RIGHT EYE: OD_EXAM: NORMAL

## 2023-12-26 ASSESSMENT — VISUAL ACUITY
OS_PH_SC: 20/50
METHOD: SNELLEN - LINEAR
OS_SC: 20/200
OD_PH_SC: 20/40
OD_SC: 20/150

## 2023-12-26 ASSESSMENT — SLIT LAMP EXAM - LIDS
COMMENTS: NORMAL
COMMENTS: NORMAL

## 2023-12-26 NOTE — PROGRESS NOTES
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 (CMS/HCC)  -refer to Retina service for evaluation and management  -pt was advised to have better blood sugar control  -pt was advised she will need regular dilated diabetic exams   Type 2 diabetes mellitus with hyperglycemia, with long-term current use of insulin (CMS/HCC)  -     Referral to Ophthalmology  Nuclear sclerotic cataract of both eyes  continue to monitor    Myopia, bilateral  Regular astigmatism of both eyes  Presbyopia    Refractive error  -give Rx for new glasses    Family history of glaucoma  -pt was advised of importance or yearly glaucoma check with family history of glaucoma    Hx of MS    Return in  6  month(s) for follow up or sooner if having any problems

## 2024-01-07 PROBLEM — E11.3313 MODERATE NONPROLIFERATIVE DIABETIC RETINOPATHY OF BOTH EYES WITH MACULAR EDEMA ASSOCIATED WITH TYPE 2 DIABETES MELLITUS (MULTI): Status: ACTIVE | Noted: 2024-01-07

## 2024-01-07 NOTE — PROGRESS NOTES
"Subjective   Patient ID: Tracey Stephen is a 43 y.o. female.      HPI    Patient referred by Dr. Baker for diabetic Macular Edema.      Reports been having blurred vision about 3 years, no pain. Had recent stroke 5/12/23. Little worsening of vision with stroke. Reports previous laser in both eyes.   Last edited by Chad Lamb MD on 1/8/2024  9:45 AM.        No current outpatient medications on file. (Ophthalmology pharm classes)       Current Outpatient Medications (Other)   Medication Sig Dispense Refill    acetaminophen (Tylenol) 325 mg tablet Take 1 tablet (325 mg) by mouth every 4 hours. 30 tablet 2    Alcohol Prep Pads pads, medicated USE TOPICALLY PRIOR TO TESTING OR INJECTING INSULIN. 100 each 11    amLODIPine (Norvasc) 10 mg tablet Take 1 tablet (10 mg) by mouth once daily. 30 tablet 11    aspirin 81 mg chewable tablet TAKE 1 TABLET BY ORAL/FEEDING TUBE ROUTE ONCE DAILY. 30 tablet 11    atorvastatin (Lipitor) 80 mg tablet Take 1 tablet (80 mg) by mouth once daily at bedtime. 90 tablet 3    Basaglar KwikPen U-100 Insulin 100 unit/mL (3 mL) pen 20 UNIT(S) SUBCUTANEOUS 2 TIMES A DAY 3 mL 11    dulaglutide (Trulicity) 1.5 mg/0.5 mL pen injector Inject 1.5 mg under the skin 1 (one) time per week. 2 mL 11    Floranex 1 million cell tablet tablet USE AS DIRECTED. 30 tablet 11    hydroCHLOROthiazide (HYDRODiuril) 12.5 mg tablet Take 1 tablet (12.5 mg) by mouth once daily. 30 tablet 11    loratadine (Claritin) 10 mg tablet Take 1 tablet (10 mg) by mouth once daily. 30 tablet 5    metFORMIN (Glucophage) 500 mg tablet Take 1 tablet (500 mg) by mouth 2 times a day with meals. 60 tablet 11    OneTouch Ultra Test strip 3 each before breakfast, before lunch, before evening meal, and at bedtime. 100 strip 11    pen needle, diabetic 31 gauge x 5/16\" needle Use to inject 1-4 times daily as directed. 100 each 11    pantoprazole (ProtoNix) 40 mg EC tablet Take 1 tablet (40 mg) by mouth once daily. Do not crush, chew, or " split. 30 tablet 5       Objective   Base Eye Exam       Visual Acuity (Snellen - Linear)         Right Left    Dist sc 20/150 20/200      Correction: Glasses              Tonometry (Goldmann Applanation, 8:58 AM)         Right Left    Pressure 12 13              Pupils         Pupils    Right PERRL, No APD    Left PERRL, No APD              Visual Fields (Counting fingers)         Left Right     Full Full              Extraocular Movement         Right Left     Full, Ortho Full, Ortho              Neuro/Psych       Oriented x3: Yes    Mood/Affect: Normal              Dilation       Both eyes: 1% Mydriacyl & 2.5% Harshil  @ 8:58 AM                  Slit Lamp and Fundus Exam       External Exam         Right Left    External Normal Normal              Slit Lamp Exam         Right Left    Lids/Lashes Normal Normal    Conjunctiva/Sclera White and quiet White and quiet    Cornea Clear Clear    Anterior Chamber Deep and quiet Deep and quiet    Iris Round and reactive Round and reactive    Lens 2+ Nuclear sclerosis 2+ Nuclear sclerosis    Anterior Vitreous Normal Normal              Fundus Exam         Right Left    Posterior Vitreous Normal Normal    Disc Normal Normal    C/D Ratio 0.35 0.35    Macula Scattered dot and blot heme and exudates, macular edema, ?few focal laser spots Scattered dot and blot heme and exudates, macular edema, ?few focal laser spots    Vessels Normal Normal    Periphery Normal Normal                  Imaging    Macula OCT 01/08/24  Right eye (OD): DME, loss of normal contour, IRF, mild DRIL, multiple intraretinal hyperreflective foci  Left eye (OS): DME, loss of normal contour, IRF, mild DRIL, multiple intraretinal hyperreflective foci      Assessment/Plan   #Severe nonproliferative diabetic retinopathy of both eyes with macular edema associated with type 2 diabetes mellitus (CMS/[facility])  -Last A1c 13.8% 6/2023  -DME present both eyes  -Reports previous laser and eye injections about 3 years ago,  unsure of provider  -Limited BCVA both eyes today, with central DME present both eyes  -Recommend initiation of anti-VEGF for DME both eyes, patient agreeable  -Follow up 2-3 weeks for planned anti-VEGF both eyes, will obtain insurance approval    #Cataracts both eyes  -Managed by Dr. Baker

## 2024-01-08 ENCOUNTER — OFFICE VISIT (OUTPATIENT)
Dept: OPHTHALMOLOGY | Facility: CLINIC | Age: 44
End: 2024-01-08
Payer: COMMERCIAL

## 2024-01-08 DIAGNOSIS — E11.3413 SEVERE NONPROLIFERATIVE DIABETIC RETINOPATHY OF BOTH EYES WITH MACULAR EDEMA ASSOCIATED WITH TYPE 2 DIABETES MELLITUS (MULTI): Primary | ICD-10-CM

## 2024-01-08 PROCEDURE — 99213 OFFICE O/P EST LOW 20 MIN: CPT | Performed by: OPHTHALMOLOGY

## 2024-01-08 PROCEDURE — 92134 CPTRZ OPH DX IMG PST SGM RTA: CPT | Mod: BILATERAL PROCEDURE | Performed by: OPHTHALMOLOGY

## 2024-01-08 ASSESSMENT — ENCOUNTER SYMPTOMS
NEUROLOGICAL NEGATIVE: 0
EYES NEGATIVE: 1
CONSTITUTIONAL NEGATIVE: 0

## 2024-01-08 ASSESSMENT — CONF VISUAL FIELD
OD_SUPERIOR_NASAL_RESTRICTION: 0
OD_NORMAL: 1
OS_SUPERIOR_NASAL_RESTRICTION: 0
OD_SUPERIOR_TEMPORAL_RESTRICTION: 0
OS_INFERIOR_TEMPORAL_RESTRICTION: 0
OD_INFERIOR_NASAL_RESTRICTION: 0
METHOD: COUNTING FINGERS
OS_SUPERIOR_TEMPORAL_RESTRICTION: 0
OS_NORMAL: 1
OD_INFERIOR_TEMPORAL_RESTRICTION: 0
OS_INFERIOR_NASAL_RESTRICTION: 0

## 2024-01-08 ASSESSMENT — TONOMETRY
OD_IOP_MMHG: 12
OS_IOP_MMHG: 13
IOP_METHOD: GOLDMANN APPLANATION

## 2024-01-08 ASSESSMENT — CUP TO DISC RATIO
OD_RATIO: 0.35
OS_RATIO: 0.35

## 2024-01-08 ASSESSMENT — VISUAL ACUITY
CORRECTION_TYPE: GLASSES
OS_SC: 20/200
OD_SC: 20/150
METHOD: SNELLEN - LINEAR

## 2024-01-08 ASSESSMENT — EXTERNAL EXAM - RIGHT EYE: OD_EXAM: NORMAL

## 2024-01-08 ASSESSMENT — SLIT LAMP EXAM - LIDS
COMMENTS: NORMAL
COMMENTS: NORMAL

## 2024-01-08 ASSESSMENT — EXTERNAL EXAM - LEFT EYE: OS_EXAM: NORMAL

## 2024-02-13 NOTE — PROGRESS NOTES
FOLLOW-UP VISIT     PROBLEM LIST:  1. 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 female who is being seen today for a colposcopy as her last pap smear of the cuff on 12/13/23 was positive for HPV.       PAST MEDICAL HISTORY:  Past Medical History:   Diagnosis Date    Acute candidiasis of vulva and vagina 06/08/2015    Vaginitis due to Candida albicans    Acute vaginitis 02/06/2015    Bacterial vaginosis    Anxiety disorder, unspecified     Anxiety    Bipolar disorder, unspecified (CMS/[facility])     Bipolar disorder    Dysmenorrhea, unspecified 01/04/2016    Menstrual cramp    Encounter for follow-up examination after completed treatment for conditions other than malignant neoplasm 01/31/2018    Postoperative examination    Encounter for other general counseling and advice on contraception 05/09/2017    Birth control counseling    Encounter for screening for malignant neoplasm of cervix 03/21/2017    Cervical cancer screening    Mastitis without abscess 02/26/2018    Cellulitis of left breast    Obesity, unspecified 02/26/2018    Class 1 obesity    Personal history of other diseases of the female genital tract 07/25/2017    History of abnormal uterine bleeding    Personal history of other endocrine, nutritional and metabolic disease 12/08/2015    History of type 2 diabetes mellitus    Personal history of other infectious and parasitic diseases 05/09/2017    History of trichomoniasis    Personal history of other specified conditions 01/31/2018    History of urinary incontinence    Personal history of other specified conditions 07/25/2017    History of urinary incontinence    Post-traumatic stress disorder, unspecified     PTSD (post-traumatic stress disorder)    Type 2 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 (CMS/[facility]) 08/07/2018    DM (diabetes mellitus), type 2 with neurological complications    Unspecified urinary incontinence     Bladder leak        PAST SURGICAL HISTORY:  Past Surgical History:   Procedure Laterality Date    BREAST SURGERY  09/02/2015    Breast Surgery Reduction Procedure    MR HEAD ANGIO WO IV CONTRAST  6/15/2023    MR HEAD ANGIO WO IV CONTRAST 6/15/2023 [facility]    MR NECK ANGIO WO IV CONTRAST  6/15/2023    MR NECK ANGIO WO IV CONTRAST 6/15/2023 [facility] MRI    OTHER SURGICAL HISTORY  01/31/2018    Laparoscopy With Total Hysterectomy For Uterus > 250g    OTHER SURGICAL HISTORY  01/31/2018    Oviductal Surgery Bilateral Salpingectomy    TUBAL LIGATION  09/02/2015    Tubal Ligation        ALLERGIES:   Allergies   Allergen Reactions    Hay Fever And Allergy Relief Itching        MEDICATIONS:        SOCIAL HISTORY:  Patient  reports that she has never smoked. She has never used smokeless tobacco. She reports current drug use. Drug: Marijuana. She reports that she does not drink alcohol.   Social History     Socioeconomic History    Marital status: Single     Spouse name: Not on file    Number of children: Not on file    Years of education: Not on file    Highest education level: Not on file   Occupational History    Not on file   Tobacco Use    Smoking status: Never    Smokeless tobacco: Never   Substance and Sexual Activity    Alcohol use: Never     Comment: Socil    Drug use: Yes     Types: Marijuana    Sexual activity: Not on file   Other Topics Concern    Not on file   Social History Narrative    Not on file     Social Determinants of Health     Financial Resource Strain: Not on file   Food Insecurity: Not on file   Transportation Needs: Not on file   Physical Activity: Not on file   Stress: Not on file   Social Connections: Not on file   Intimate Partner Violence: Not on file   Housing Stability: Not on file       FAMILY HISTORY:  No family history on file.    REVIEW OF SYSTEMS:  Constitutional: Negative for fever and chills. Denies anorexia, weight loss.  Eyes: Negative for visual disturbance.   Respiratory: Negative for shortness of  breath.    Cardiovascular: Negative for chest pain.   Gastrointestinal: Negative for nausea and vomiting.   Genitourinary: See interval history above.  Skin: Negative for rash.   Neurological: Negative for dizziness and numbness.   Psychiatric/Behavioral: Negative for confusion and decreased concentration.     PHYSICAL EXAM:  There were no vitals taken for this visit.  Constitutional: Patient appears well-developed and well-nourished. No distress.    Head: Normocephalic and atraumatic.    Neck: Normal range of motion.    Cardiovascular: Normal rate.    Pulmonary/Chest: Effort normal. No respiratory distress.   Abdominal:   : There were no acetowhite changes. Just a small lesion at the cuff of cervix.  Musculoskeletal: Normal range of motion.    Neurological: Alert and oriented to person, place, and time.  Psychiatric: Normal mood and affect. Behavior is normal. Thought content normal.      Procedure:   Consent obtained,   Speculum inserted and cervix visualized. Vaginal cuff cleaned and no obvious lesions appreciated. Small mass appears to be scar from cuff.  Acetic acid placed on the cuff and colposcope used and area of .   Biopsy taken of mass and sent to pathology.   Monsells placed on the cervix.   Hemostasis achieved.   Pt tolerated the procedure well.        Assessment:          Tracey Stephen is a 43 y.o. female with high risk HPV from vaginal cuff.     Plan:    Follow up with results.       By signing my name below, Kerline DELAROSA Scribe   attest that this documentation has been prepared under the direction and in the presence of Dr. Ana Gracia.

## 2024-02-14 ENCOUNTER — OFFICE VISIT (OUTPATIENT)
Dept: UROLOGY | Facility: HOSPITAL | Age: 44
End: 2024-02-14
Payer: COMMERCIAL

## 2024-02-14 VITALS
DIASTOLIC BLOOD PRESSURE: 101 MMHG | BODY MASS INDEX: 30.62 KG/M2 | TEMPERATURE: 96.1 F | HEART RATE: 82 BPM | WEIGHT: 184 LBS | SYSTOLIC BLOOD PRESSURE: 170 MMHG

## 2024-02-14 DIAGNOSIS — R87.629 ABNORMAL PAP SMEAR OF VAGINA: ICD-10-CM

## 2024-02-14 PROCEDURE — 3080F DIAST BP >= 90 MM HG: CPT | Performed by: OBSTETRICS & GYNECOLOGY

## 2024-02-14 PROCEDURE — 3008F BODY MASS INDEX DOCD: CPT | Performed by: OBSTETRICS & GYNECOLOGY

## 2024-02-14 PROCEDURE — 88305 TISSUE EXAM BY PATHOLOGIST: CPT | Performed by: PATHOLOGY

## 2024-02-14 PROCEDURE — 88305 TISSUE EXAM BY PATHOLOGIST: CPT

## 2024-02-14 PROCEDURE — 3077F SYST BP >= 140 MM HG: CPT | Performed by: OBSTETRICS & GYNECOLOGY

## 2024-02-14 PROCEDURE — 1036F TOBACCO NON-USER: CPT | Performed by: OBSTETRICS & GYNECOLOGY

## 2024-02-14 ASSESSMENT — PAIN SCALES - GENERAL: PAINLEVEL: 0-NO PAIN

## 2024-02-19 ENCOUNTER — APPOINTMENT (OUTPATIENT)
Dept: OPHTHALMOLOGY | Facility: CLINIC | Age: 44
End: 2024-02-19
Payer: COMMERCIAL

## 2024-02-19 LAB
LABORATORY COMMENT REPORT: NORMAL
PATH REPORT.FINAL DX SPEC: NORMAL
PATH REPORT.GROSS SPEC: NORMAL
PATH REPORT.RELEVANT HX SPEC: NORMAL
PATH REPORT.TOTAL CANCER: NORMAL

## 2024-02-20 ENCOUNTER — APPOINTMENT (OUTPATIENT)
Dept: PODIATRY | Facility: CLINIC | Age: 44
End: 2024-02-20
Payer: COMMERCIAL

## 2024-03-21 ENCOUNTER — APPOINTMENT (OUTPATIENT)
Dept: NEUROLOGY | Facility: CLINIC | Age: 44
End: 2024-03-21
Payer: COMMERCIAL

## 2024-03-26 ENCOUNTER — OFFICE VISIT (OUTPATIENT)
Dept: PRIMARY CARE | Facility: CLINIC | Age: 44
End: 2024-03-26
Payer: COMMERCIAL

## 2024-03-26 VITALS
OXYGEN SATURATION: 100 % | BODY MASS INDEX: 29.89 KG/M2 | DIASTOLIC BLOOD PRESSURE: 99 MMHG | HEART RATE: 83 BPM | TEMPERATURE: 96.7 F | HEIGHT: 66 IN | SYSTOLIC BLOOD PRESSURE: 168 MMHG | WEIGHT: 186 LBS

## 2024-03-26 DIAGNOSIS — Z79.4 TYPE 2 DIABETES MELLITUS WITH HYPERGLYCEMIA, WITH LONG-TERM CURRENT USE OF INSULIN (MULTI): ICD-10-CM

## 2024-03-26 DIAGNOSIS — I10 HYPERTENSION, UNSPECIFIED TYPE: ICD-10-CM

## 2024-03-26 DIAGNOSIS — E11.65 TYPE 2 DIABETES MELLITUS WITH HYPERGLYCEMIA, WITH LONG-TERM CURRENT USE OF INSULIN (MULTI): ICD-10-CM

## 2024-03-26 LAB — POC FINGERSTICK BLOOD GLUCOSE: 322 MG/DL (ref 70–100)

## 2024-03-26 PROCEDURE — 3077F SYST BP >= 140 MM HG: CPT | Performed by: STUDENT IN AN ORGANIZED HEALTH CARE EDUCATION/TRAINING PROGRAM

## 2024-03-26 PROCEDURE — 99214 OFFICE O/P EST MOD 30 MIN: CPT | Performed by: STUDENT IN AN ORGANIZED HEALTH CARE EDUCATION/TRAINING PROGRAM

## 2024-03-26 PROCEDURE — 3008F BODY MASS INDEX DOCD: CPT | Performed by: STUDENT IN AN ORGANIZED HEALTH CARE EDUCATION/TRAINING PROGRAM

## 2024-03-26 PROCEDURE — 3080F DIAST BP >= 90 MM HG: CPT | Performed by: STUDENT IN AN ORGANIZED HEALTH CARE EDUCATION/TRAINING PROGRAM

## 2024-03-26 PROCEDURE — 82962 GLUCOSE BLOOD TEST: CPT | Performed by: STUDENT IN AN ORGANIZED HEALTH CARE EDUCATION/TRAINING PROGRAM

## 2024-03-26 RX ORDER — ATORVASTATIN CALCIUM 80 MG/1
80 TABLET, FILM COATED ORAL NIGHTLY
Qty: 90 TABLET | Refills: 3 | Status: SHIPPED | OUTPATIENT
Start: 2024-03-26 | End: 2025-03-26

## 2024-03-26 RX ORDER — NAPROXEN SODIUM 220 MG/1
TABLET, FILM COATED ORAL
Qty: 30 TABLET | Refills: 11 | Status: SHIPPED | OUTPATIENT
Start: 2024-03-26 | End: 2024-05-07

## 2024-03-26 RX ORDER — METFORMIN HYDROCHLORIDE 500 MG/1
500 TABLET ORAL
Qty: 60 TABLET | Refills: 11 | OUTPATIENT
Start: 2024-03-26 | End: 2024-05-07

## 2024-03-26 RX ORDER — HYDROCHLOROTHIAZIDE 12.5 MG/1
12.5 TABLET ORAL DAILY
Qty: 30 TABLET | Refills: 11 | Status: SHIPPED | OUTPATIENT
Start: 2024-03-26 | End: 2024-05-21

## 2024-03-26 RX ORDER — AMLODIPINE BESYLATE 10 MG/1
10 TABLET ORAL
Qty: 30 TABLET | Refills: 11 | Status: SHIPPED | OUTPATIENT
Start: 2024-03-26 | End: 2025-03-26

## 2024-03-26 RX ORDER — INSULIN GLARGINE 100 [IU]/ML
INJECTION, SOLUTION SUBCUTANEOUS
Qty: 3 ML | Refills: 11 | Status: SHIPPED | OUTPATIENT
Start: 2024-03-26 | End: 2024-05-21

## 2024-03-26 RX ORDER — DULAGLUTIDE 1.5 MG/.5ML
1.5 INJECTION, SOLUTION SUBCUTANEOUS
Qty: 2 ML | Refills: 11 | Status: SHIPPED | OUTPATIENT
Start: 2024-03-26

## 2024-03-26 RX ORDER — UBIQUINOL 100 MG
CAPSULE ORAL
Qty: 100 EACH | Refills: 11 | Status: SHIPPED | OUTPATIENT
Start: 2024-03-26

## 2024-03-26 RX ORDER — BLOOD SUGAR DIAGNOSTIC
3 STRIP MISCELLANEOUS
Qty: 100 STRIP | Refills: 11 | Status: SHIPPED | OUTPATIENT
Start: 2024-03-26

## 2024-03-26 RX ORDER — PEN NEEDLE, DIABETIC 30 GX3/16"
NEEDLE, DISPOSABLE MISCELLANEOUS
Qty: 100 EACH | Refills: 11 | Status: SHIPPED | OUTPATIENT
Start: 2024-03-26 | End: 2025-03-26

## 2024-03-26 ASSESSMENT — ENCOUNTER SYMPTOMS
DEPRESSION: 1
OCCASIONAL FEELINGS OF UNSTEADINESS: 1
LOSS OF SENSATION IN FEET: 1

## 2024-03-26 ASSESSMENT — PATIENT HEALTH QUESTIONNAIRE - PHQ9
1. LITTLE INTEREST OR PLEASURE IN DOING THINGS: NOT AT ALL
2. FEELING DOWN, DEPRESSED OR HOPELESS: MORE THAN HALF THE DAYS
SUM OF ALL RESPONSES TO PHQ9 QUESTIONS 1 AND 2: 2

## 2024-03-26 ASSESSMENT — PAIN SCALES - GENERAL: PAINLEVEL: 5

## 2024-03-26 NOTE — PROGRESS NOTES
"Subjective   Patient ID: Tracey Stephen is a 43 y.o. female who presents for Annual Exam and Med Refill (' I think I have keloid ').    HPI    #dm  Needs refill on her insulin   She doesn't how much she is giving her self  She has two pens : trulicity  She gives herself insulin 4 times day  She's checks her sugars at home and they have been high  Doesn't want to tell me the numbers  She has been off insulin 6 months  She has a lantus in her chart  States she takes a meal time but doesn't know which one or how much   Has the glucose machine but needs the test strips   Last exam was in November but has to go back to the eye doctor   Had a foot exam in feb of this year     #htn  Has been off her bp meds  Also for about 6 months  Denies chest pain but has occasional sob  He had a stroke may of last year and has sob since then   Has a blood pressure machine at home but hasn't been checking her numbers       Review of Systems  ROS negative except for above mentioned.      Objective   BP (!) 168/99 (BP Location: Right arm, Patient Position: Sitting)   Pulse 83   Temp 35.9 °C (96.7 °F)   Ht 1.664 m (5' 5.5\")   Wt 84.4 kg (186 lb)   SpO2 100%   BMI 30.48 kg/m²     Physical Exam  General: Well developed, well nourished, alert and cooperative, appears to be in no acute distress.   HEENT: Normocephalic, atraumatic.   Cardiac: Systolic murmur appreciated loudest on DAMIEN sternal border.  No peripheral edema, cyanosis, or pallor. Extremities warm and well perfused.   Lungs: Clear to auscultation bilaterally. No rales, rhonchi, wheezing, diminished breath sounds.   MSK: ROM intact spine and extremities.  Skin: Skin normal color, texture, and turgor with no lesions or eruptions.   Psych: Oriented to person, place, and time. Demonstrates good judgement and reason.    Assessment/Plan   A 43 y o F with PMH significant for HTN, uncontrolled T2DM on insulin, CKD, former smoker, recent CVA with mild residual right sided weakness, who " "presents for a follow up on her chronic conditions. Patient out of medications for 6 months. Counseled on imp of medication compliance due to her hx of stroke which puts her at high risk for another stroke. Chronic medications refilled and blood work ordered.     Problem List Items Addressed This Visit         Type 2 diabetes mellitus with hyperglycemia, with long-term current use of insulin (CMS/[facility])        Chronic, uncontrolled  Relevant Medications    Cw dulaglutide (Trulicity) 1.5 mg/0.5 mL pen injector injection    Start Basaglar KwikPen U-100 Insulin 100 unit/mL (3 mL) pen  20 units BID    OneTouch Ultra Test strip    pen needle, diabetic 31 gauge x 5/16\" needle    Cw metFORMIN (Glucophage) 500 mg tablet bid    Alcohol Prep Pads pads, medicated  Patient to check him bg and bring  numbers to next visit     Other Relevant Orders    Hemoglobin A1c    Lipid Panel Non-Fasting    POCT Fingerstick Glucose manually resulted (Completed)    Hypertension, unspecified type      Hx of stroke  Chronic, uncontrolled     Relevant Medications    Cw amLODIPine (Norvasc) 10 mg tablet    Cw hydroCHLOROthiazide (Microzide) 12.5 mg tablet    Cw atorvastatin (Lipitor) 80 mg tablet    Cw aspirin 81 mg chewable tablet    Other Relevant Orders    Renal function panel  Patient to check him blood pressure and bring numbers to next visit          RTC in 1month for a fu on HTN and DM    Patient seen and discussed with attending, Dr. Héctor Mendoza MD  Family Medicine, PGY-3      "

## 2024-05-03 ENCOUNTER — CLINICAL SUPPORT (OUTPATIENT)
Dept: EMERGENCY MEDICINE | Facility: HOSPITAL | Age: 44
End: 2024-05-03
Payer: COMMERCIAL

## 2024-05-03 ENCOUNTER — HOSPITAL ENCOUNTER (INPATIENT)
Facility: HOSPITAL | Age: 44
LOS: 1 days | Discharge: LEFT BEFORE DISCHARGE | End: 2024-05-04
Attending: EMERGENCY MEDICINE | Admitting: STUDENT IN AN ORGANIZED HEALTH CARE EDUCATION/TRAINING PROGRAM
Payer: COMMERCIAL

## 2024-05-03 ENCOUNTER — APPOINTMENT (OUTPATIENT)
Dept: RADIOLOGY | Facility: HOSPITAL | Age: 44
End: 2024-05-03
Payer: COMMERCIAL

## 2024-05-03 DIAGNOSIS — I63.9 CEREBROVASCULAR ACCIDENT (CVA), UNSPECIFIED MECHANISM (MULTI): ICD-10-CM

## 2024-05-03 DIAGNOSIS — R47.81 SLURRED SPEECH: Primary | ICD-10-CM

## 2024-05-03 LAB
ABO GROUP (TYPE) IN BLOOD: NORMAL
ALBUMIN SERPL BCP-MCNC: 3.5 G/DL (ref 3.4–5)
ALP SERPL-CCNC: 86 U/L (ref 33–110)
ALT SERPL W P-5'-P-CCNC: 5 U/L (ref 7–45)
ANION GAP SERPL CALC-SCNC: 16 MMOL/L (ref 10–20)
ANTIBODY SCREEN: NORMAL
APPEARANCE UR: CLEAR
APTT PPP: 35 SECONDS (ref 27–38)
AST SERPL W P-5'-P-CCNC: 10 U/L (ref 9–39)
BASOPHILS # BLD AUTO: 0.08 X10*3/UL (ref 0–0.1)
BASOPHILS NFR BLD AUTO: 0.9 %
BILIRUB SERPL-MCNC: 0.7 MG/DL (ref 0–1.2)
BILIRUB UR STRIP.AUTO-MCNC: NEGATIVE MG/DL
BUN SERPL-MCNC: 20 MG/DL (ref 6–23)
CALCIUM SERPL-MCNC: 9.4 MG/DL (ref 8.6–10.6)
CHLORIDE SERPL-SCNC: 104 MMOL/L (ref 98–107)
CO2 SERPL-SCNC: 21 MMOL/L (ref 21–32)
COLOR UR: ABNORMAL
CREAT SERPL-MCNC: 1.26 MG/DL (ref 0.5–1.05)
EGFRCR SERPLBLD CKD-EPI 2021: 54 ML/MIN/1.73M*2
EOSINOPHIL # BLD AUTO: 0.33 X10*3/UL (ref 0–0.7)
EOSINOPHIL NFR BLD AUTO: 3.8 %
ERYTHROCYTE [DISTWIDTH] IN BLOOD BY AUTOMATED COUNT: 12.8 % (ref 11.5–14.5)
GLUCOSE BLD MANUAL STRIP-MCNC: 271 MG/DL (ref 74–99)
GLUCOSE SERPL-MCNC: 258 MG/DL (ref 74–99)
GLUCOSE UR STRIP.AUTO-MCNC: ABNORMAL MG/DL
HCT VFR BLD AUTO: 42.1 % (ref 36–46)
HGB BLD-MCNC: 14.9 G/DL (ref 12–16)
HYALINE CASTS #/AREA URNS AUTO: ABNORMAL /LPF
IMM GRANULOCYTES # BLD AUTO: 0.02 X10*3/UL (ref 0–0.7)
IMM GRANULOCYTES NFR BLD AUTO: 0.2 % (ref 0–0.9)
INR PPP: 1 (ref 0.9–1.1)
KETONES UR STRIP.AUTO-MCNC: NEGATIVE MG/DL
LEUKOCYTE ESTERASE UR QL STRIP.AUTO: ABNORMAL
LYMPHOCYTES # BLD AUTO: 3.48 X10*3/UL (ref 1.2–4.8)
LYMPHOCYTES NFR BLD AUTO: 40.3 %
MAGNESIUM SERPL-MCNC: 1.75 MG/DL (ref 1.6–2.4)
MCH RBC QN AUTO: 29.7 PG (ref 26–34)
MCHC RBC AUTO-ENTMCNC: 35.4 G/DL (ref 32–36)
MCV RBC AUTO: 84 FL (ref 80–100)
MONOCYTES # BLD AUTO: 0.4 X10*3/UL (ref 0.1–1)
MONOCYTES NFR BLD AUTO: 4.6 %
MUCOUS THREADS #/AREA URNS AUTO: ABNORMAL /LPF
NEUTROPHILS # BLD AUTO: 4.32 X10*3/UL (ref 1.2–7.7)
NEUTROPHILS NFR BLD AUTO: 50.2 %
NITRITE UR QL STRIP.AUTO: NEGATIVE
NRBC BLD-RTO: 0 /100 WBCS (ref 0–0)
PH UR STRIP.AUTO: 6 [PH]
PLATELET # BLD AUTO: 364 X10*3/UL (ref 150–450)
POTASSIUM SERPL-SCNC: 4.1 MMOL/L (ref 3.5–5.3)
PROT SERPL-MCNC: 7.5 G/DL (ref 6.4–8.2)
PROT UR STRIP.AUTO-MCNC: ABNORMAL MG/DL
PROTHROMBIN TIME: 10.7 SECONDS (ref 9.8–12.8)
RBC # BLD AUTO: 5.01 X10*6/UL (ref 4–5.2)
RBC # UR STRIP.AUTO: ABNORMAL /UL
RBC #/AREA URNS AUTO: >20 /HPF
RH FACTOR (ANTIGEN D): NORMAL
SODIUM SERPL-SCNC: 137 MMOL/L (ref 136–145)
SP GR UR STRIP.AUTO: 1.02
SQUAMOUS #/AREA URNS AUTO: ABNORMAL /HPF
UROBILINOGEN UR STRIP.AUTO-MCNC: NORMAL MG/DL
WBC # BLD AUTO: 8.6 X10*3/UL (ref 4.4–11.3)
WBC #/AREA URNS AUTO: ABNORMAL /HPF
YEAST BUDDING #/AREA UR COMP ASSIST: PRESENT /HPF

## 2024-05-03 PROCEDURE — 86900 BLOOD TYPING SEROLOGIC ABO: CPT | Performed by: STUDENT IN AN ORGANIZED HEALTH CARE EDUCATION/TRAINING PROGRAM

## 2024-05-03 PROCEDURE — 70450 CT HEAD/BRAIN W/O DYE: CPT

## 2024-05-03 PROCEDURE — 70547 MR ANGIOGRAPHY NECK W/O DYE: CPT

## 2024-05-03 PROCEDURE — 80053 COMPREHEN METABOLIC PANEL: CPT | Performed by: STUDENT IN AN ORGANIZED HEALTH CARE EDUCATION/TRAINING PROGRAM

## 2024-05-03 PROCEDURE — 85730 THROMBOPLASTIN TIME PARTIAL: CPT | Performed by: STUDENT IN AN ORGANIZED HEALTH CARE EDUCATION/TRAINING PROGRAM

## 2024-05-03 PROCEDURE — 70553 MRI BRAIN STEM W/O & W/DYE: CPT | Performed by: STUDENT IN AN ORGANIZED HEALTH CARE EDUCATION/TRAINING PROGRAM

## 2024-05-03 PROCEDURE — 82947 ASSAY GLUCOSE BLOOD QUANT: CPT

## 2024-05-03 PROCEDURE — 70544 MR ANGIOGRAPHY HEAD W/O DYE: CPT | Performed by: STUDENT IN AN ORGANIZED HEALTH CARE EDUCATION/TRAINING PROGRAM

## 2024-05-03 PROCEDURE — 99285 EMERGENCY DEPT VISIT HI MDM: CPT | Mod: 25

## 2024-05-03 PROCEDURE — 93005 ELECTROCARDIOGRAM TRACING: CPT

## 2024-05-03 PROCEDURE — 70450 CT HEAD/BRAIN W/O DYE: CPT | Performed by: RADIOLOGY

## 2024-05-03 PROCEDURE — 70547 MR ANGIOGRAPHY NECK W/O DYE: CPT | Performed by: STUDENT IN AN ORGANIZED HEALTH CARE EDUCATION/TRAINING PROGRAM

## 2024-05-03 PROCEDURE — 86850 RBC ANTIBODY SCREEN: CPT | Performed by: STUDENT IN AN ORGANIZED HEALTH CARE EDUCATION/TRAINING PROGRAM

## 2024-05-03 PROCEDURE — 70544 MR ANGIOGRAPHY HEAD W/O DYE: CPT

## 2024-05-03 PROCEDURE — 71045 X-RAY EXAM CHEST 1 VIEW: CPT | Performed by: STUDENT IN AN ORGANIZED HEALTH CARE EDUCATION/TRAINING PROGRAM

## 2024-05-03 PROCEDURE — 81001 URINALYSIS AUTO W/SCOPE: CPT | Performed by: STUDENT IN AN ORGANIZED HEALTH CARE EDUCATION/TRAINING PROGRAM

## 2024-05-03 PROCEDURE — 85025 COMPLETE CBC W/AUTO DIFF WBC: CPT | Performed by: STUDENT IN AN ORGANIZED HEALTH CARE EDUCATION/TRAINING PROGRAM

## 2024-05-03 PROCEDURE — 93010 ELECTROCARDIOGRAM REPORT: CPT | Performed by: EMERGENCY MEDICINE

## 2024-05-03 PROCEDURE — 2550000001 HC RX 255 CONTRASTS: Mod: SE | Performed by: EMERGENCY MEDICINE

## 2024-05-03 PROCEDURE — 83735 ASSAY OF MAGNESIUM: CPT | Performed by: STUDENT IN AN ORGANIZED HEALTH CARE EDUCATION/TRAINING PROGRAM

## 2024-05-03 PROCEDURE — 71045 X-RAY EXAM CHEST 1 VIEW: CPT

## 2024-05-03 PROCEDURE — 85610 PROTHROMBIN TIME: CPT | Performed by: STUDENT IN AN ORGANIZED HEALTH CARE EDUCATION/TRAINING PROGRAM

## 2024-05-03 PROCEDURE — 70553 MRI BRAIN STEM W/O & W/DYE: CPT

## 2024-05-03 PROCEDURE — 36415 COLL VENOUS BLD VENIPUNCTURE: CPT | Performed by: STUDENT IN AN ORGANIZED HEALTH CARE EDUCATION/TRAINING PROGRAM

## 2024-05-03 PROCEDURE — 96360 HYDRATION IV INFUSION INIT: CPT

## 2024-05-03 PROCEDURE — A9575 INJ GADOTERATE MEGLUMI 0.1ML: HCPCS | Mod: SE | Performed by: EMERGENCY MEDICINE

## 2024-05-03 PROCEDURE — 99285 EMERGENCY DEPT VISIT HI MDM: CPT | Performed by: EMERGENCY MEDICINE

## 2024-05-03 PROCEDURE — 2500000004 HC RX 250 GENERAL PHARMACY W/ HCPCS (ALT 636 FOR OP/ED): Mod: SE | Performed by: STUDENT IN AN ORGANIZED HEALTH CARE EDUCATION/TRAINING PROGRAM

## 2024-05-03 PROCEDURE — 87086 URINE CULTURE/COLONY COUNT: CPT | Performed by: STUDENT IN AN ORGANIZED HEALTH CARE EDUCATION/TRAINING PROGRAM

## 2024-05-03 RX ORDER — GADOTERATE MEGLUMINE 376.9 MG/ML
20 INJECTION INTRAVENOUS
Status: COMPLETED | OUTPATIENT
Start: 2024-05-03 | End: 2024-05-03

## 2024-05-03 RX ADMIN — SODIUM CHLORIDE, POTASSIUM CHLORIDE, SODIUM LACTATE AND CALCIUM CHLORIDE 1000 ML: 600; 310; 30; 20 INJECTION, SOLUTION INTRAVENOUS at 17:22

## 2024-05-03 RX ADMIN — GADOTERATE MEGLUMINE 17 ML: 376.9 INJECTION INTRAVENOUS at 22:50

## 2024-05-03 ASSESSMENT — LIFESTYLE VARIABLES
HAVE YOU EVER FELT YOU SHOULD CUT DOWN ON YOUR DRINKING: NO
HAVE PEOPLE ANNOYED YOU BY CRITICIZING YOUR DRINKING: NO
EVER HAD A DRINK FIRST THING IN THE MORNING TO STEADY YOUR NERVES TO GET RID OF A HANGOVER: NO
TOTAL SCORE: 0
EVER FELT BAD OR GUILTY ABOUT YOUR DRINKING: NO

## 2024-05-03 ASSESSMENT — PAIN SCALES - GENERAL
PAINLEVEL_OUTOF10: 0 - NO PAIN

## 2024-05-03 ASSESSMENT — COLUMBIA-SUICIDE SEVERITY RATING SCALE - C-SSRS
1. IN THE PAST MONTH, HAVE YOU WISHED YOU WERE DEAD OR WISHED YOU COULD GO TO SLEEP AND NOT WAKE UP?: NO
6. HAVE YOU EVER DONE ANYTHING, STARTED TO DO ANYTHING, OR PREPARED TO DO ANYTHING TO END YOUR LIFE?: NO
2. HAVE YOU ACTUALLY HAD ANY THOUGHTS OF KILLING YOURSELF?: NO

## 2024-05-03 ASSESSMENT — PAIN - FUNCTIONAL ASSESSMENT: PAIN_FUNCTIONAL_ASSESSMENT: 0-10

## 2024-05-03 NOTE — CONSULTS
"STROKE CONSULT NOTE    Tracey Stephen is a 43 y.o. 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 LKW 2200 (5/2/24). Symptoms continued to persist on arrival.     Relevant History per Chart Review:  Pt presented to [facility] on 5/12/23 with 1 month hx of intermittent slurred speech, word-finding difficulty and LLE weakness. Exam was intact. Noted to have poorly controlled DM with glucose in 400s. CTH unremarkable. CTA h&n w/o LVO or high-grade stenosis. No TNK given OOW. MRI brain showed an acute to subacute R CHARLIE distribution infarct centered in the midportion of the cingulate gyrus with involvement of the adjacent corpus callosum and remote lacunar infarcts in b/l corona radiata, L thalamus, R cerebellar hemisphere.. Teleneurology consulted - recommended ASA, statin and cardiology consult for KARIN given age and location of stroke. Another BAT called 5/17 ~1600 for lightheadedness, leaning to left when getting up, diplopia, \"some aphasia per CNP\". NIHSS 1 for dysarthria. CTH showed evolving known R CHARLIE infarct. Repeat MRI brain showed known evolving infarct and additional L frontal infarct. KARIN showed EF 55%, grade I LV diastolic dysfunction, mod LVH, no PFO per negative bubble study. Pt then admitted to [facility] (6/15-6/18/23) for transient episode of R body stiffness, was seen by Neurology (Dr. Galo), who noted L hemiparesis (LUE/LLE drift) and mild-mod dysarthria. Suspected TIA, c/f ASA failure and plan for MRI/MRA  brain, Apixaban + hypercoag workup. MRI brain showed abnormal diffusion restriction of R paramedian frontal lobe extending into splenium of corpus callosum c/f demyelinating plaque vs acute infarct and numerous supratentorial/brainstem lesions, some perpendicular to ventricles, c/f MS. MRA " "showed decreased flow in L P2 c/f mod focal stenosis, otherwise patent vasculature. Hypercoag workup negative (Protein C/S, factor V leiden) except for mildly elevated homocysteine (23.44) and elevated antithrombin (136). Per Dr. Galo, reviewed MRI and similar to one from [facility], rec Apixaban 5mg BID. Pt seen again by Dr. Galo on 09/2023 when pt was not on Apixaban and unsure why, so he restarted in given c/f ASA failure. Pt then seen by PCP on 3/26 when she was non-compliant with all home meds for past 6 months, she was restarted on her chronic meds but do not see apixaban listed.     History of Present Illness:  History obtained from interview with patient, noted to have word-finding difficulty with pt visibly frustrated that she's unable to get her words out however, able to overcome it with time and multiple attempts. Pt also noted to be visibly distressed and tearful re being \"back in the hospital\", with improvement in mood after re-assurance. She states that she was in her usual state of health until ~2200 yesterday (5/2) when pt had sudden-onset word-finding difficulty and slurred speech. Pt went to bed, thinking that she was just tired however, the symptoms continued to persist this morning (5/3), which prompted her to come in the ED. She states that she had a stroke ~1 year prior when she had similar word-finding difficulty, which improved over time and reportedly, she was able to form full sentences prior to this sudden change. She also endorses R leg weakness since her prior stroke, which feels worse. Pt also c/o R venancio-body numbness. Also, she has a throbbing left-sided headache w/o associated nausea, photo/phonophobia. With regards to her home meds, she states that she is not on any blood thinners. She also states that she takes her aspirin ~15 days/month.     Otherwise, denies fever, UTI symptoms, dizziness/lightheadedness, \"blacking out\" spells, shaking movements.    ROS:   + cough  All " "systems reviewed and were negative except as above    BAT Course:  Initial , /111. NIHSS 3 (dysarthria 1, aphasia 1, R hemibody numbness 1). LKW 2200 (5/2/24). Pt taken straight to CT scanner. CTH w/o showed hypodensities of basal ganglia, thalami, external capsules and L corona radiata similar from prior CTH (6/14/23) with encephalomalacia in R corpus callosum. Did not pursue CTA given lack of cortical signs. Pt not candidate for TNK given OOW and not candidate for MT given no LVO. Cancelled BAT and converted to stroke consult.     Home Medications:    Current Outpatient Medications   Medication Instructions    acetaminophen (TYLENOL) 325 mg, oral, Every 4 hours RT    Alcohol Prep Pads pads, medicated USE TOPICALLY PRIOR TO TESTING OR INJECTING INSULIN.    amLODIPine (NORVASC) 10 mg, oral, Daily RT    aspirin 81 mg chewable tablet TAKE 1 TABLET BY ORAL/FEEDING TUBE ROUTE ONCE DAILY.    atorvastatin (LIPITOR) 80 mg, oral, Nightly    Basaglar KwikPen U-100 Insulin 100 unit/mL (3 mL) pen 20 UNIT(S) SUBCUTANEOUS 2 TIMES A DAY    Floranex 1 million cell tablet tablet USE AS DIRECTED.    hydroCHLOROthiazide (MICROZIDE) 12.5 mg, oral, Daily    loratadine (CLARITIN) 10 mg, oral, Daily    metFORMIN (GLUCOPHAGE) 500 mg, oral, 2 times daily with meals    OneTouch Ultra Test strip 3 each, miscellaneous, 3 times daily before meals and nightly    pantoprazole (PROTONIX) 40 mg, oral, Daily, Do not crush, chew, or split.    pen needle, diabetic 31 gauge x 5/16\" needle Use to inject 1-4 times daily as directed.    Trulicity 1.5 mg, subcutaneous, Once Weekly       Past Medical History:    has a past medical history of Acute candidiasis of vulva and vagina (06/08/2015), Acute vaginitis (02/06/2015), Anxiety disorder, unspecified, Bipolar disorder, unspecified (Multi), Dysmenorrhea, unspecified (01/04/2016), Encounter for follow-up examination after completed treatment for conditions other than malignant neoplasm " (01/31/2018), Encounter for other general counseling and advice on contraception (05/09/2017), Encounter for screening for malignant neoplasm of cervix (03/21/2017), Mastitis without abscess (02/26/2018), Obesity, unspecified (02/26/2018), Personal history of other diseases of the female genital tract (07/25/2017), Personal history of other endocrine, nutritional and metabolic disease (12/08/2015), Personal history of other infectious and parasitic diseases (05/09/2017), Personal history of other specified conditions (01/31/2018), Personal history of other specified conditions (07/25/2017), Post-traumatic stress disorder, unspecified, Type 2 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 (Multi) (08/07/2018), and Unspecified urinary incontinence.    Past Surgical History:    has a past surgical history that includes Other surgical history (01/31/2018); Other surgical history (01/31/2018); Tubal ligation (09/02/2015); Breast surgery (09/02/2015); MR angio head wo IV contrast (6/15/2023); and MR angio neck wo IV contrast (6/15/2023).    Allergies:   Allergies   Allergen Reactions    Hay Fever And Allergy Relief Itching    Pollen Extracts Unknown       Family History:   No FH of recurrent strokes or blood clots.    Past Social History:   - Lives at home with daughter and son  - Baseline functional status - independent with most ADL/IADLs, requires assistance for cooking/cleaning, ambulates with cane  - Tobacco: 0.5 ppd, 14 pack year smoking hx  - Alcohol: occasional, ~once a month  - Illicit drugs: smokes marijuana daily ~5 blunts per day, denies other drugs    Vitals:   Temperature:  [36.5 °C (97.7 °F)] 36.5 °C (97.7 °F)  Heart Rate:  [] 101  Respirations:  [17-18] 18  BP: (175-180)/() 175/96    NIHSS on arrival:    1a  Level of consciousness: 0=alert; keenly responsive   1b. LOC questions:  0=Performs both tasks correctly   1c. LOC commands: 0=Performs both tasks correctly   2.  Best  Gaze: 0=normal   3.  Visual: 0=No visual loss   4. Facial Palsy: 0=Normal symmetric movement   5a.  Motor left arm: 0=No drift, limb holds 90 (or 45) degrees for full 10 seconds   5b.  Motor right arm: 0=No drift, limb holds 90 (or 45) degrees for full 10 seconds   6a. motor left le=No drift, limb holds 90 (or 45) degrees for full 10 seconds   6b  Motor right le=No drift, limb holds 90 (or 45) degrees for full 10 seconds   7. Limb Ataxia: 0=Absent   8.  Sensory: 1=Mild to moderate sensory loss; patient feels pinprick is less sharp or is dull on the affected side; there is a loss of superficial pain with pinprick but patient is aware She is being touched   9. Best Language:  1=Mild to moderate aphasia; some obvious loss of fluency or facility of comprehension without significant limitation on ideas expressed or form of expression.   10. Dysarthria: 1=Mild to moderate, patient slurs at least some words and at worst, can be understood with some difficulty   11. Extinction and Inattention: 0=No abnormality   12. Distal motor function: 0=Normal    Total:   3       Physical Exam:   GENERAL APPEARANCE:  No distress, alert, interactive and cooperative.     CARDIOVASCULAR: Regular rate and rhythm. Radial pulses +2 and equal.      MENTAL STATE:   Orientation was normal to time, place, person and situation. Language testing impaired for fluency with mod-severe word finding difficulty but able to overcome and express herself with patience and multiple attempts. Testing intact to naming (3/3), repetition (non-fluent but intact) and comprehension (able to follow simple commands).     OPHTHALMOSCOPIC:   The ophthalmoscopic exam was deferred.    CRANIAL NERVES:   CN 2   Visual fields full to confrontation.   CN 3, 4, 6   Pupils round, 4 mm in diameter, equally reactive to light. Lids symmetric; no ptosis. EOMs normal alignment, full range with normal saccades, pursuit and convergence.   No nystagmus.   CN 5   Sensation  diminished to light touch on the right in V1-V3 distribution, intact on left.   CN 7   Normal and symmetric facial strength. Nasolabial folds symmetric.   CN 8   Hearing intact to conversation.  CN 9/10  Palate elevates symmetrically.   CN 11   Normal strength of shoulder shrug and neck turning.   CN 12   Tongue midline, with normal bulk and strength; no fasciculations.     MOTOR:   Muscle bulk and tone were normal in both upper and lower extremities.   No fasciculations, tremor or other abnormal movements were present.   Noted subtle RUE weakness with orbiting around RUE.                                  R          L  Shoulder abd       5          5  Elbow flex.            5          5  Elbow ext.            5          5         5      5    Hip flex.              4-          5  Knee flex.            5          5  Knee ext.             5          5  Dorsiflex             5          5  Plantarflex         5          5    REFLEXES:                       R          L  Biceps:         2          2  Triceps:        2          2  Knee:           2          2  Ankle:          1          1    Babinski: toes withdraw to plantar stim bilaterally    SENSORY:   Sensation was diminished to light touch in RUE/RLE, intact on left.    COORDINATION:    In both upper extremities, finger-nose-finger was intact without dysmetria or overshoot.     GAIT:   Deferred for pt safety.      Labs:   Results from last 72 hours   Lab Units 05/03/24  1654   WBC AUTO x10*3/uL 8.6   HEMOGLOBIN g/dL 14.9   HEMATOCRIT % 42.1   PLATELETS AUTO x10*3/uL 364      Results from last 72 hours   Lab Units 05/03/24  1654   SODIUM mmol/L 137   POTASSIUM mmol/L 4.1   CHLORIDE mmol/L 104   CO2 mmol/L 21   BUN mg/dL 20   CREATININE mg/dL 1.26*   MAGNESIUM mg/dL 1.75   GLUCOSE mg/dL 258*      Results from last 72 hours   Lab Units 05/03/24  1654   ALK PHOS U/L 86   AST U/L 10   ALT U/L 5*   BILIRUBIN TOTAL mg/dL 0.7       Lab Results   Component Value Date     GLUCOSEU >=500(3+) (A) 06/19/2023    BLOODU MODERATE(2+) (A) 06/19/2023    PROTUR 100(2+) (A) 06/19/2023    NITRITEU NEGATIVE 06/19/2023    LEUKOCYTESU LARGE(3+) (A) 06/19/2023    WBCU 32 (A) 06/19/2023    RBCU 7 (A) 06/19/2023         Imaging:  CTH w/o BAT (5/3/24):  Hypodensities within the basal ganglia, thalami, external capsules, and left corona radiata extending into the posterior limb of the left internal capsule are relatively similar from June 14, 2023 and could reflect areas of previous ischemic injury. There is also encephalomalacia in the right corpus callosum. MRI with diffusion-weighted imaging would be a much more sensitive means of assessing for acute infarct. There is no definitive evidence of acute cortical infarct.    MRI brain w/o (6/15/23):  There is abnormal diffusion restriction within the right paramedian frontal lobe extending into the splenium of the corpus callosum on the right. Differential considerations include a demyelinating plaque, or an acute infarct. An underlying lesion is felt less likely but not entirely excluded. Recommend multiple sclerosis protocol and postcontrast images to further evaluate.   Numerous supratentorial and brainstem lesions some of which are oriented perpendicular to the lateral ventricles suggestive of a demyelinating process such as multiple sclerosis.     MRA h&n (6/15/23):  Segmental decreased flow in the left P2 segment of the PCA corresponding to the similar location visualized on the prior CT angio head compatible with focal moderate stenosis.  Otherwise, no significant stenosis or arterial cutoff of the major intracranial vessels.   No significant stenosis at the carotid bifurcation bilaterally.         Assessment/Recommendations  Tracey Stephen is a 43 y.o. 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 LKW 2200 (5/2/24). Initial , /111. NIHSS 3 (dysarthria 1, aphasia 1, R hemibody numbness 1). Pt taken straight to CT scanner. CTH w/o showed hypodensities of basal ganglia, thalami, external capsules and L corona radiata similar from prior CTH (6/14/23) with encephalomalacia in R corpus callosum. Did not pursue CTA given lack of cortical signs. Pt not candidate for TNK given OOW and not candidate for MT given no LVO. Cancelled BAT and converted to stroke consult.     Cerebrovascular Event Classification  Type: Cerebral Infarction   Subtype: TOAST 2 - cardioembolic  (unspecified) vs small vessel disease  Vessel(s) Involved: L MCA branches  Neurologic Manifestations: Expressive aphasia, dysarthria and R venancio-body numbness  Deficits: NIHSS 3  Pre-admit Anti-PLT/Anti-Coag/Anti-Lipid: Aspirin   Initial treatment: None  Vascular Risk Factors: Poorly controlled HTN, T2DM, non-compliant with AP (ASA), smoker  BP at presentation: 180/11  BP goal: <220  Disposition: pending     Vascular Risk Factor modification goals:  Blood pressure goals: avoid hypotension SBP <100 that could worsen cerebral perfusion, Ischemic stroke- early permissive hypertension SBP < 220 mmHg with cautious inpatient lowering  Lipid Goals: education on healthy diet and statin therapy to maintain or achieve goal LDL-cholesterol < 70mg  Glucose Goals: early treatment of hyperglycemia to goal glucose 140-180 mg/dl with long-term goal A1c < 7%   Smoking Cessation and Education  Assessment for Rehabilitation needs including PT/OT and if indicated swallow evaluation and speech therapy   Patient and family education on signs and symptoms of stroke, calling 911, risk factors, and healthy strategies for stroke prevention.       Recommendations:  - would consider using the Focused Stroke Order Set available in Epic.  - obtain MRI Brain w/wo (stroke protocol) and MRA Head & Neck w/o  - c/w home Aspirin  81mg for antithrombotic therapy  - c/w home atorvastatin 80mg at bedtime  - place NPO order until RN completes bedside swallow assessment  - consult PT/OT/SLP  - adopt vascular risk factor modification goals as above  - rest of care per ED      We will continue to follow. Please page [phone] with any further questions.    Les Castillo MD  PGY-2, Neurology    Update Post MRI:  Reviewed MRI w/wo - shows diffusion restriction in L PLIC and small ditzel in L insula with subtle nearby contrast enhancement, most consistent with stroke. Etiology still requires further workup however patient leaving AMA from ED at this time because she may lose housing for her family if she does not sign a lease at 10am tomorrow (no family member is able to do it for her). We discussed the risks and she has capacity to make this decision, we also discussed return parameters, Passed bedside swallow.    Plan update:  - continue aspirin 81  - continue atorvastatin 80  - START plavix 75  - blood glucose control (last A1c 10month ago 13.6)  - tobacco cessation discussed, patient plans to stop now  - neuro followup    Discussed with fellow  Bobby Tomas MD  PGY3 neurology

## 2024-05-03 NOTE — ED TRIAGE NOTES
Pt presents with right sided weakness, expressive aphagia and slurred speech. Pt states symptoms started 2200 last night instead of the initial report of 1700. Pt is awake and alert x4, c/o HA last night,  has PMH of a CVA with residual right sided weakness and expressive aphagia. Initial BAT page at 1628, pt to ct scan upon arrival at 1633, neuro to ct at 1634, POCT glucose 271, initial NIH 2 for aphagia and sensory.

## 2024-05-03 NOTE — ED PROVIDER NOTES
"History/Exam limitations: none.   Additional history was obtained from EMS personnel.    HPI:       Tracey Stephen is a 43 y.o. with a history of prior stroke with residual right-sided weakness, insulin-dependent T2DM, hypertension, CKD who presents over concern for stroke.  Patient reports worsening slurred speech as well as feeling as though her right arm was \"heavy\" beginning last night at 2200.  She states she has residual slurred speech and right-sided weakness from her prior stroke however she stated the symptoms appear to worsen.  She denies any recent falls or traumas.  She takes a daily aspirin but otherwise is not on blood thinners..       Last Known Well Time: 2200 [id number]        ------------------------------------------------------------------------------------------------------------------------------------------     Physical Exam:    ED Triage Vitals [05/03/24 1645]   Temperature Heart Rate Respirations BP   36.5 °C (97.7 °F) 96 17 (!) 180/111      Pulse Ox Temp Source Heart Rate Source Patient Position   100 % Oral Monitor --      BP Location FiO2 (%)     -- --          Gen: Not in acute distress  Head/Neck: NCAT  Eyes: Anicteric sclerae, noninjected conjunctivae  Nose: No rhinorrhea  Mouth:  MMM  Heart: Regular rate and rhythm w/ no murmurs, rubs, gallops  Lungs: CTAB w/o wheezes, rales, rhonchi, no increased work of breathing  Abdomen: Soft, NT/ND  Musculoskeletal: No deformities  Extremities: No edema.  Neurologic: Please see below for NIHSS  Skin: No rashes noted  Psychological: Calm        Interval: Baseline  Time: 10:14 PM  Person Administering Scale: Benji Jackson DO     1a  Level of consciousness: 0=alert; keenly responsive   1b. LOC questions:  0=Performs both tasks correctly   1c. LOC commands: 0=Performs both tasks correctly   2.  Best Gaze: 0=normal   3. Visual: 0=No visual loss   4. Facial Palsy: 1=Minor paralysis (flattened nasolabial fold, asymmetric on smiling)   5a. Motor left arm: " "0=No drift, limb holds 90 (or 45) degrees for full 10 seconds   5b.  Motor right arm: 1=Drift, limb holds 90 (or 45) degrees but drifts down before full 10 seconds: does not hit bed   6a. motor left le=No drift, limb holds 90 (or 45) degrees for full 10 seconds   6b  Motor right le=No drift, limb holds 90 (or 45) degrees for full 10 seconds   7. Limb Ataxia: 0=Absent   8.  Sensory: 0=Normal; no sensory loss   9. Best Language:  1=Mild to moderate aphasia; some obvious loss of fluency or facility of comprehension without significant limitation on ideas expressed or form of expression.   10. Dysarthria: 1=Mild to moderate, patient slurs at least some words and at worst, can be understood with some difficulty   11. Extinction and Inattention: 0=No abnormality     Total:   4         VAN: VAN: Negative     ------------------------------------------------------------------------------------------------------------------------------------------     Medical Decision Makin-year-old female with a history of prior stroke with residual right-sided weakness, insulin-dependent T2DM, hypertension, CKD who presents over concern for stroke.  Patient reports worsening slurred speech as well as feeling as though her right arm was \"heavy\" beginning last night at 2200.  She states she has residual slurred speech and right-sided weakness from her prior stroke however she stated the symptoms appear to worsen.  On exam, patient is hypertensive, otherwise vitals normal, she is in no acute distress and nontoxic-appearing, she does have mild dysarthria, mild aphasia, mild right upper extremity weakness but no drift against gravity with an NIHSS of 4.  She was taken immediately to CT scanner that showed no acute bleed.  TNK not administered given last known well greater than 4 and half hours prior as well as the fact the patient has residual right-sided deficits from prior stroke.  We did proceed with MRI imaging and " consultation with neurostroke.    Patient was signed out awaiting MRI and final neurostroke recommendations.      ED Course as of 05/06/24 1849   Fri May 03, 2024   1720 I independently interpreted:   Hypodensities within the basal ganglia, thalamus, corona radiata similar from prior CT from June 14, 2023 [CHITO]   1933 XR chest 1 view  I independently interpreted:   No consolidation or effusion [CHITO]   2209 ECG 12 lead  I independently interpreted:   Rate 92 bpm, sinus rhythm, normal axis.  Normal intervals.  T wave inversions in leads aVR which are normal.  No appreciable ST elevations or depressions.  Impression: Normal sinus rhythm [CHITO]      ED Course User Index  [CHITO] Benji Jackson DO         Diagnoses as of 05/06/24 1849   Slurred speech   Cerebrovascular accident (CVA), unspecified mechanism (Multi)        EKG interpreted by myself:  Rate 92 bpm, sinus rhythm, normal axis.  Normal intervals.  T wave inversions in leads aVR which are normal.  No appreciable ST elevations or depressions.  Impression: Normal sinus rhythm     Independent Interpretation of Studies: I independently interpreted the CT head and see No obvious evidence of intracranial hemorrhage and No obvious evidence of skull fracture    Chronic Medical Conditions Significantly Affecting Care: Prior stroke, insulin-dependent T2DM, hypertension, CKD    Social Determinants of Health Significantly Affecting Care:  n/a     External Records Reviewed: I reviewed recent and relevant outside records including: Prior discharge summary     Discussion of Management with Other Providers:   I discussed the patient/results with:  neurology and the admitting team      IV Thrombolysis IV Thrombolysis Checklist        IV Thrombolysis Given: No; Thrombolysis contraindication reason: Time from Last Known Well (or stroke onset) is >4.5 hours         Procedure  Procedures          Benji Jackson DO  Resident  05/04/24 0040       Benji Jackson DO  Resident  05/06/24  1849

## 2024-05-04 VITALS
HEIGHT: 65 IN | TEMPERATURE: 97.7 F | DIASTOLIC BLOOD PRESSURE: 99 MMHG | WEIGHT: 189 LBS | HEART RATE: 91 BPM | SYSTOLIC BLOOD PRESSURE: 148 MMHG | BODY MASS INDEX: 31.49 KG/M2 | RESPIRATION RATE: 16 BRPM | OXYGEN SATURATION: 97 %

## 2024-05-04 PROBLEM — R47.81 SLURRED SPEECH: Status: ACTIVE | Noted: 2024-05-04

## 2024-05-04 LAB
ATRIAL RATE: 92 BPM
BACTERIA UR CULT: NORMAL
HOLD SPECIMEN: NORMAL
P AXIS: 39 DEGREES
P OFFSET: 203 MS
P ONSET: 169 MS
PR INTERVAL: 100 MS
Q ONSET: 219 MS
QRS COUNT: 15 BEATS
QRS DURATION: 80 MS
QT INTERVAL: 328 MS
QTC CALCULATION(BAZETT): 405 MS
QTC FREDERICIA: 378 MS
R AXIS: 12 DEGREES
T AXIS: 19 DEGREES
T OFFSET: 383 MS
VENTRICULAR RATE: 92 BPM

## 2024-05-04 PROCEDURE — 1210000001 HC SEMI-PRIVATE ROOM DAILY

## 2024-05-04 RX ORDER — DEXTROSE 50 % IN WATER (D50W) INTRAVENOUS SYRINGE
25
Status: CANCELLED | OUTPATIENT
Start: 2024-05-04

## 2024-05-04 RX ORDER — ATORVASTATIN CALCIUM 20 MG/1
80 TABLET, FILM COATED ORAL NIGHTLY
Status: CANCELLED | OUTPATIENT
Start: 2024-05-04

## 2024-05-04 RX ORDER — INSULIN GLARGINE 100 [IU]/ML
20 INJECTION, SOLUTION SUBCUTANEOUS 2 TIMES DAILY
Status: CANCELLED | OUTPATIENT
Start: 2024-05-04

## 2024-05-04 RX ORDER — CLOPIDOGREL BISULFATE 75 MG/1
75 TABLET ORAL DAILY
Qty: 30 TABLET | Refills: 1 | Status: SHIPPED | OUTPATIENT
Start: 2024-05-04 | End: 2024-05-07

## 2024-05-04 RX ORDER — DEXTROSE 50 % IN WATER (D50W) INTRAVENOUS SYRINGE
12.5
Status: CANCELLED | OUTPATIENT
Start: 2024-05-04

## 2024-05-04 NOTE — PROGRESS NOTES
Patient was handed off to me from the previous team. For full history, physical, and prior ED course, please see previous provider note prior to patient handoff. This is an addendum to the record.    Briefly, this is a 43-year-old female with history of prior stroke with residual right-sided deficits who presents to the emergency department with concern for worsening of her deficits onset 2200 yesterday.  Patient was not a thrombectomy candidate. Patient was signed out pending MRI results and neurology recommendations for disposition at time of signout.    Hospital Course/MDM:  MRI is concerning for new stroke within the posterior limb of the left internal capsule and the white matter of the left  subinsular region. Per discussion with Neurology, they are recommending admission to their service and initiation of dual antiplatelet therapy.  Patient had an extensive discussion with neurology team at bedside.  She is not agreeable to admission at this time as she states there are financial matters she needs to tend to, but states she will return to the emergency department as soon as possible.  The risks of leaving against medical advice were discussed in detail with the patient including but not limited to worsening of her condition, falling, passing out, or death. Patient expressed understanding of the risks but still declined to stay and be admitted. She will be leaving AGAINST MEDICAL ADVICE.  Patient was provided a prescription for Plavix to take in addition to her aspirin and statin per Neurology recommendations.  She agrees to return to the emergency department as soon as possible and to follow-up with neurology.    Disposition:  Leaving AMA    --  Nicky Jordan MD  Emergency Medicine, PGY-3

## 2024-05-04 NOTE — DISCHARGE INSTRUCTIONS
Take Plavix, Aspirin, and statin daily.  Follow-up with neurology or return to the emergency department as soon as possible.

## 2024-05-05 ENCOUNTER — CLINICAL SUPPORT (OUTPATIENT)
Dept: EMERGENCY MEDICINE | Facility: HOSPITAL | Age: 44
End: 2024-05-05
Payer: COMMERCIAL

## 2024-05-05 ENCOUNTER — HOSPITAL ENCOUNTER (INPATIENT)
Facility: HOSPITAL | Age: 44
LOS: 3 days | Discharge: NURSING, REHAB OR LONG-TERM CARE | End: 2024-05-08
Attending: EMERGENCY MEDICINE | Admitting: STUDENT IN AN ORGANIZED HEALTH CARE EDUCATION/TRAINING PROGRAM
Payer: COMMERCIAL

## 2024-05-05 ENCOUNTER — APPOINTMENT (OUTPATIENT)
Dept: RADIOLOGY | Facility: HOSPITAL | Age: 44
End: 2024-05-05
Payer: COMMERCIAL

## 2024-05-05 DIAGNOSIS — E11.59 TYPE 2 DIABETES MELLITUS WITH OTHER CIRCULATORY COMPLICATION, WITH LONG-TERM CURRENT USE OF INSULIN (MULTI): ICD-10-CM

## 2024-05-05 DIAGNOSIS — I63.89 OTHER CEREBRAL INFARCTION (MULTI): ICD-10-CM

## 2024-05-05 DIAGNOSIS — E11.42 DIABETIC POLYNEUROPATHY ASSOCIATED WITH TYPE 2 DIABETES MELLITUS (MULTI): ICD-10-CM

## 2024-05-05 DIAGNOSIS — I63.9 CEREBROVASCULAR ACCIDENT (CVA), UNSPECIFIED MECHANISM (MULTI): Primary | ICD-10-CM

## 2024-05-05 DIAGNOSIS — Z79.4 TYPE 2 DIABETES MELLITUS WITH HYPERGLYCEMIA, WITH LONG-TERM CURRENT USE OF INSULIN (MULTI): ICD-10-CM

## 2024-05-05 DIAGNOSIS — Z79.4 TYPE 2 DIABETES MELLITUS WITH OTHER CIRCULATORY COMPLICATION, WITH LONG-TERM CURRENT USE OF INSULIN (MULTI): ICD-10-CM

## 2024-05-05 DIAGNOSIS — N17.9 ACUTE KIDNEY INJURY (CMS-HCC): ICD-10-CM

## 2024-05-05 DIAGNOSIS — E11.65 TYPE 2 DIABETES MELLITUS WITH HYPERGLYCEMIA, WITH LONG-TERM CURRENT USE OF INSULIN (MULTI): ICD-10-CM

## 2024-05-05 DIAGNOSIS — I10 HYPERTENSION, UNSPECIFIED TYPE: ICD-10-CM

## 2024-05-05 LAB
ALBUMIN SERPL BCP-MCNC: 3 G/DL (ref 3.4–5)
ALP SERPL-CCNC: 74 U/L (ref 33–110)
ALT SERPL W P-5'-P-CCNC: 3 U/L (ref 7–45)
ANION GAP BLDV CALCULATED.4IONS-SCNC: 10 MMOL/L (ref 10–25)
ANION GAP SERPL CALC-SCNC: 16 MMOL/L (ref 10–20)
APTT PPP: 32 SECONDS (ref 27–38)
AST SERPL W P-5'-P-CCNC: 9 U/L (ref 9–39)
BASE EXCESS BLDV CALC-SCNC: -0.8 MMOL/L (ref -2–3)
BASOPHILS # BLD AUTO: 0.06 X10*3/UL (ref 0–0.1)
BASOPHILS NFR BLD AUTO: 0.7 %
BILIRUB SERPL-MCNC: 0.6 MG/DL (ref 0–1.2)
BNP SERPL-MCNC: <2 PG/ML (ref 0–99)
BODY TEMPERATURE: 37 DEGREES CELSIUS
BUN SERPL-MCNC: 23 MG/DL (ref 6–23)
CA-I BLDV-SCNC: 1.22 MMOL/L (ref 1.1–1.33)
CALCIUM SERPL-MCNC: 8.5 MG/DL (ref 8.6–10.6)
CARDIAC TROPONIN I PNL SERPL HS: 15 NG/L (ref 0–34)
CARDIAC TROPONIN I PNL SERPL HS: 16 NG/L (ref 0–34)
CHLORIDE BLDV-SCNC: 106 MMOL/L (ref 98–107)
CHLORIDE SERPL-SCNC: 104 MMOL/L (ref 98–107)
CHOLEST SERPL-MCNC: 221 MG/DL (ref 0–199)
CHOLESTEROL/HDL RATIO: 6.9
CO2 SERPL-SCNC: 20 MMOL/L (ref 21–32)
CREAT SERPL-MCNC: 1.59 MG/DL (ref 0.5–1.05)
EGFRCR SERPLBLD CKD-EPI 2021: 41 ML/MIN/1.73M*2
EOSINOPHIL # BLD AUTO: 0.35 X10*3/UL (ref 0–0.7)
EOSINOPHIL NFR BLD AUTO: 3.8 %
ERYTHROCYTE [DISTWIDTH] IN BLOOD BY AUTOMATED COUNT: 12.5 % (ref 11.5–14.5)
EST. AVERAGE GLUCOSE BLD GHB EST-MCNC: 258 MG/DL
GLUCOSE BLD MANUAL STRIP-MCNC: 179 MG/DL (ref 74–99)
GLUCOSE BLDV-MCNC: 260 MG/DL (ref 74–99)
GLUCOSE SERPL-MCNC: 244 MG/DL (ref 74–99)
HBA1C MFR BLD: 10.6 %
HCO3 BLDV-SCNC: 24.3 MMOL/L (ref 22–26)
HCT VFR BLD AUTO: 34.7 % (ref 36–46)
HCT VFR BLD EST: 40 % (ref 36–46)
HDLC SERPL-MCNC: 31.8 MG/DL
HGB BLD-MCNC: 12.6 G/DL (ref 12–16)
HGB BLDV-MCNC: 13.3 G/DL (ref 12–16)
IMM GRANULOCYTES # BLD AUTO: 0.02 X10*3/UL (ref 0–0.7)
IMM GRANULOCYTES NFR BLD AUTO: 0.2 % (ref 0–0.9)
INR PPP: 1 (ref 0.9–1.1)
LACTATE BLDV-SCNC: 2.1 MMOL/L (ref 0.4–2)
LDLC SERPL CALC-MCNC: 115 MG/DL
LYMPHOCYTES # BLD AUTO: 3.2 X10*3/UL (ref 1.2–4.8)
LYMPHOCYTES NFR BLD AUTO: 34.7 %
MCH RBC QN AUTO: 29.6 PG (ref 26–34)
MCHC RBC AUTO-ENTMCNC: 36.3 G/DL (ref 32–36)
MCV RBC AUTO: 82 FL (ref 80–100)
MONOCYTES # BLD AUTO: 0.45 X10*3/UL (ref 0.1–1)
MONOCYTES NFR BLD AUTO: 4.9 %
NEUTROPHILS # BLD AUTO: 5.13 X10*3/UL (ref 1.2–7.7)
NEUTROPHILS NFR BLD AUTO: 55.7 %
NON HDL CHOLESTEROL: 189 MG/DL (ref 0–149)
NRBC BLD-RTO: 0 /100 WBCS (ref 0–0)
OXYHGB MFR BLDV: 64.1 % (ref 45–75)
PCO2 BLDV: 41 MM HG (ref 41–51)
PH BLDV: 7.38 PH (ref 7.33–7.43)
PLATELET # BLD AUTO: 316 X10*3/UL (ref 150–450)
PO2 BLDV: 36 MM HG (ref 35–45)
POTASSIUM BLDV-SCNC: 4 MMOL/L (ref 3.5–5.3)
POTASSIUM SERPL-SCNC: 3.9 MMOL/L (ref 3.5–5.3)
PROT SERPL-MCNC: 6.1 G/DL (ref 6.4–8.2)
PROTHROMBIN TIME: 10.7 SECONDS (ref 9.8–12.8)
RBC # BLD AUTO: 4.26 X10*6/UL (ref 4–5.2)
SAO2 % BLDV: 66 % (ref 45–75)
SODIUM BLDV-SCNC: 136 MMOL/L (ref 136–145)
SODIUM SERPL-SCNC: 136 MMOL/L (ref 136–145)
TRIGL SERPL-MCNC: 373 MG/DL (ref 0–149)
VLDL: 75 MG/DL (ref 0–40)
WBC # BLD AUTO: 9.2 X10*3/UL (ref 4.4–11.3)

## 2024-05-05 PROCEDURE — 93005 ELECTROCARDIOGRAM TRACING: CPT

## 2024-05-05 PROCEDURE — 99223 1ST HOSP IP/OBS HIGH 75: CPT | Performed by: STUDENT IN AN ORGANIZED HEALTH CARE EDUCATION/TRAINING PROGRAM

## 2024-05-05 PROCEDURE — 84484 ASSAY OF TROPONIN QUANT: CPT | Performed by: STUDENT IN AN ORGANIZED HEALTH CARE EDUCATION/TRAINING PROGRAM

## 2024-05-05 PROCEDURE — 82810 BLOOD GASES O2 SAT ONLY: CPT

## 2024-05-05 PROCEDURE — 84132 ASSAY OF SERUM POTASSIUM: CPT | Performed by: STUDENT IN AN ORGANIZED HEALTH CARE EDUCATION/TRAINING PROGRAM

## 2024-05-05 PROCEDURE — 84484 ASSAY OF TROPONIN QUANT: CPT | Performed by: EMERGENCY MEDICINE

## 2024-05-05 PROCEDURE — 99285 EMERGENCY DEPT VISIT HI MDM: CPT | Mod: 25

## 2024-05-05 PROCEDURE — 85730 THROMBOPLASTIN TIME PARTIAL: CPT | Performed by: STUDENT IN AN ORGANIZED HEALTH CARE EDUCATION/TRAINING PROGRAM

## 2024-05-05 PROCEDURE — 70498 CT ANGIOGRAPHY NECK: CPT

## 2024-05-05 PROCEDURE — 99285 EMERGENCY DEPT VISIT HI MDM: CPT | Performed by: EMERGENCY MEDICINE

## 2024-05-05 PROCEDURE — 85610 PROTHROMBIN TIME: CPT | Performed by: STUDENT IN AN ORGANIZED HEALTH CARE EDUCATION/TRAINING PROGRAM

## 2024-05-05 PROCEDURE — 70450 CT HEAD/BRAIN W/O DYE: CPT

## 2024-05-05 PROCEDURE — 82947 ASSAY GLUCOSE BLOOD QUANT: CPT

## 2024-05-05 PROCEDURE — 83605 ASSAY OF LACTIC ACID: CPT

## 2024-05-05 PROCEDURE — 2500000001 HC RX 250 WO HCPCS SELF ADMINISTERED DRUGS (ALT 637 FOR MEDICARE OP): Performed by: STUDENT IN AN ORGANIZED HEALTH CARE EDUCATION/TRAINING PROGRAM

## 2024-05-05 PROCEDURE — 83036 HEMOGLOBIN GLYCOSYLATED A1C: CPT | Performed by: STUDENT IN AN ORGANIZED HEALTH CARE EDUCATION/TRAINING PROGRAM

## 2024-05-05 PROCEDURE — 70496 CT ANGIOGRAPHY HEAD: CPT

## 2024-05-05 PROCEDURE — 36415 COLL VENOUS BLD VENIPUNCTURE: CPT

## 2024-05-05 PROCEDURE — 80061 LIPID PANEL: CPT | Performed by: STUDENT IN AN ORGANIZED HEALTH CARE EDUCATION/TRAINING PROGRAM

## 2024-05-05 PROCEDURE — 1200000002 HC GENERAL ROOM WITH TELEMETRY DAILY

## 2024-05-05 PROCEDURE — 2500000004 HC RX 250 GENERAL PHARMACY W/ HCPCS (ALT 636 FOR OP/ED): Performed by: STUDENT IN AN ORGANIZED HEALTH CARE EDUCATION/TRAINING PROGRAM

## 2024-05-05 PROCEDURE — 2550000001 HC RX 255 CONTRASTS: Mod: SE | Performed by: STUDENT IN AN ORGANIZED HEALTH CARE EDUCATION/TRAINING PROGRAM

## 2024-05-05 PROCEDURE — 82435 ASSAY OF BLOOD CHLORIDE: CPT

## 2024-05-05 PROCEDURE — 83880 ASSAY OF NATRIURETIC PEPTIDE: CPT | Performed by: STUDENT IN AN ORGANIZED HEALTH CARE EDUCATION/TRAINING PROGRAM

## 2024-05-05 PROCEDURE — 85025 COMPLETE CBC W/AUTO DIFF WBC: CPT | Performed by: STUDENT IN AN ORGANIZED HEALTH CARE EDUCATION/TRAINING PROGRAM

## 2024-05-05 PROCEDURE — 82435 ASSAY OF BLOOD CHLORIDE: CPT | Performed by: STUDENT IN AN ORGANIZED HEALTH CARE EDUCATION/TRAINING PROGRAM

## 2024-05-05 PROCEDURE — 36415 COLL VENOUS BLD VENIPUNCTURE: CPT | Performed by: EMERGENCY MEDICINE

## 2024-05-05 RX ORDER — HYDRALAZINE HYDROCHLORIDE 25 MG/1
25 TABLET, FILM COATED ORAL EVERY 6 HOURS PRN
Status: DISCONTINUED | OUTPATIENT
Start: 2024-05-07 | End: 2024-05-08 | Stop reason: HOSPADM

## 2024-05-05 RX ORDER — CLOPIDOGREL BISULFATE 75 MG/1
75 TABLET ORAL DAILY
Status: DISCONTINUED | OUTPATIENT
Start: 2024-05-06 | End: 2024-05-08 | Stop reason: HOSPADM

## 2024-05-05 RX ORDER — HYDRALAZINE HYDROCHLORIDE 20 MG/ML
10 INJECTION INTRAMUSCULAR; INTRAVENOUS
Status: ACTIVE | OUTPATIENT
Start: 2024-05-05 | End: 2024-05-07

## 2024-05-05 RX ORDER — DEXTROSE 50 % IN WATER (D50W) INTRAVENOUS SYRINGE
12.5
Status: DISCONTINUED | OUTPATIENT
Start: 2024-05-05 | End: 2024-05-08 | Stop reason: HOSPADM

## 2024-05-05 RX ORDER — ASPIRIN 81 MG/1
81 TABLET ORAL DAILY
Status: DISCONTINUED | OUTPATIENT
Start: 2024-05-06 | End: 2024-05-08 | Stop reason: HOSPADM

## 2024-05-05 RX ORDER — CLOPIDOGREL BISULFATE 300 MG/1
300 TABLET, FILM COATED ORAL ONCE
Status: COMPLETED | OUTPATIENT
Start: 2024-05-05 | End: 2024-05-05

## 2024-05-05 RX ORDER — INSULIN LISPRO 100 [IU]/ML
0-5 INJECTION, SOLUTION INTRAVENOUS; SUBCUTANEOUS
Status: DISCONTINUED | OUTPATIENT
Start: 2024-05-06 | End: 2024-05-07

## 2024-05-05 RX ORDER — ENOXAPARIN SODIUM 100 MG/ML
40 INJECTION SUBCUTANEOUS EVERY 24 HOURS
Status: DISCONTINUED | OUTPATIENT
Start: 2024-05-05 | End: 2024-05-08 | Stop reason: HOSPADM

## 2024-05-05 RX ORDER — DEXTROSE 50 % IN WATER (D50W) INTRAVENOUS SYRINGE
25
Status: DISCONTINUED | OUTPATIENT
Start: 2024-05-05 | End: 2024-05-08 | Stop reason: HOSPADM

## 2024-05-05 RX ORDER — INSULIN LISPRO 100 [IU]/ML
0-5 INJECTION, SOLUTION INTRAVENOUS; SUBCUTANEOUS EVERY 4 HOURS
Status: DISCONTINUED | OUTPATIENT
Start: 2024-05-05 | End: 2024-05-05

## 2024-05-05 RX ORDER — POLYETHYLENE GLYCOL 3350 17 G/17G
17 POWDER, FOR SOLUTION ORAL DAILY
Status: DISCONTINUED | OUTPATIENT
Start: 2024-05-05 | End: 2024-05-08 | Stop reason: HOSPADM

## 2024-05-05 RX ORDER — ATORVASTATIN CALCIUM 40 MG/1
40 TABLET, FILM COATED ORAL NIGHTLY
Status: DISCONTINUED | OUTPATIENT
Start: 2024-05-05 | End: 2024-05-08 | Stop reason: HOSPADM

## 2024-05-05 RX ORDER — LABETALOL HYDROCHLORIDE 5 MG/ML
10 INJECTION, SOLUTION INTRAVENOUS EVERY 10 MIN PRN
Status: ACTIVE | OUTPATIENT
Start: 2024-05-05 | End: 2024-05-07

## 2024-05-05 RX ADMIN — CLOPIDOGREL BISULFATE 300 MG: 300 TABLET, FILM COATED ORAL at 14:03

## 2024-05-05 RX ADMIN — IOHEXOL 80 ML: 350 INJECTION, SOLUTION INTRAVENOUS at 12:01

## 2024-05-05 RX ADMIN — ATORVASTATIN CALCIUM 40 MG: 40 TABLET, FILM COATED ORAL at 21:17

## 2024-05-05 RX ADMIN — ENOXAPARIN SODIUM 40 MG: 100 INJECTION SUBCUTANEOUS at 21:17

## 2024-05-05 ASSESSMENT — PAIN SCALES - GENERAL
PAINLEVEL_OUTOF10: 0 - NO PAIN

## 2024-05-05 ASSESSMENT — COLUMBIA-SUICIDE SEVERITY RATING SCALE - C-SSRS
2. HAVE YOU ACTUALLY HAD ANY THOUGHTS OF KILLING YOURSELF?: NO
1. IN THE PAST MONTH, HAVE YOU WISHED YOU WERE DEAD OR WISHED YOU COULD GO TO SLEEP AND NOT WAKE UP?: NO
6. HAVE YOU EVER DONE ANYTHING, STARTED TO DO ANYTHING, OR PREPARED TO DO ANYTHING TO END YOUR LIFE?: NO

## 2024-05-05 ASSESSMENT — LIFESTYLE VARIABLES
EVER FELT BAD OR GUILTY ABOUT YOUR DRINKING: NO
HAVE PEOPLE ANNOYED YOU BY CRITICIZING YOUR DRINKING: NO
EVER HAD A DRINK FIRST THING IN THE MORNING TO STEADY YOUR NERVES TO GET RID OF A HANGOVER: NO
HAVE YOU EVER FELT YOU SHOULD CUT DOWN ON YOUR DRINKING: NO
TOTAL SCORE: 0

## 2024-05-05 ASSESSMENT — PAIN - FUNCTIONAL ASSESSMENT: PAIN_FUNCTIONAL_ASSESSMENT: 0-10

## 2024-05-05 NOTE — H&P
"History Of Present Illness    Tracey Stephen is a 43 y.o. RH female with a PMHx of BPD type II, NUBIA, insulin-dependent T2DM c/b retinopathy, HTN, prior R cingulate gyrus/corpus callosum and L frontal infarcts (5/2023) w/ residual RLE weakness, who presents to [facility] on 5/5/24 as a BAT for speech difficulty, worsening, R sided weakness.     LKW 2200 (5/2/24). Symptoms continued to persist on arrival.     Of note patient presented as BAT on 5/2/24 and was diagnosed with L PLIC stroke. Seen by Micheal Tomas and Patrick. Plan at that time was to admit for further workup, however patient left AMA to secure housing for herself.     Patient and patient's son able to give history at bedside. She went home after leaving AMA on 5/4 early AM. On 5/5 AM patient noted to have worsening weakness on R side with difficulty walking. Decided to come to ED for further care.     /50         Relevant History per Chart Review (from prior stroke consult note 5/2/24):  Pt presented to [facility] on 5/12/23 with 1 month hx of intermittent slurred speech, word-finding difficulty and LLE weakness. Exam was intact. Noted to have poorly controlled DM with glucose in 400s. CTH unremarkable. CTA h&n w/o LVO or high-grade stenosis. No TNK given OOW. MRI brain showed an acute to subacute R CHARLIE distribution infarct centered in the midportion of the cingulate gyrus with involvement of the adjacent corpus callosum and remote lacunar infarcts in b/l corona radiata, L thalamus, R cerebellar hemisphere.. Teleneurology consulted - recommended ASA, statin and cardiology consult for KARIN given age and location of stroke. Another BAT called 5/17 ~1600 for lightheadedness, leaning to left when getting up, diplopia, \"some aphasia per CNP\". NIHSS 1 for dysarthria. CTH showed evolving known R CHARLIE infarct. Repeat MRI brain showed known evolving infarct and additional L frontal infarct. KARIN showed EF 55%, grade I LV diastolic dysfunction, mod LVH, no " PFO per negative bubble study. Pt then admitted to [facility] (6/15-6/18/23) for transient episode of R body stiffness, was seen by Neurology (Dr. Galo), who noted L hemiparesis (LUE/LLE drift) and mild-mod dysarthria. Suspected TIA, c/f ASA failure and plan for MRI/MRA  brain, Apixaban + hypercoag workup. MRI brain showed abnormal diffusion restriction of R paramedian frontal lobe extending into splenium of corpus callosum c/f demyelinating plaque vs acute infarct and numerous supratentorial/brainstem lesions, some perpendicular to ventricles, c/f MS. MRA showed decreased flow in L P2 c/f mod focal stenosis, otherwise patent vasculature. Hypercoag workup negative (Protein C/S, factor V leiden) except for mildly elevated homocysteine (23.44) and elevated antithrombin (136). Per Dr. Galo, reviewed MRI and similar to one from [facility], rec Apixaban 5mg BID. Pt seen again by Dr. Galo on 09/2023 when pt was not on Apixaban and unsure why, so he restarted in given c/f ASA failure. Pt then seen by PCP on 3/26 when she was non-compliant with all home meds for past 6 months, she was restarted on her chronic meds but do not see apixaban listed.     Past Medical History  Past Medical History:   Diagnosis Date    Acute candidiasis of vulva and vagina 06/08/2015    Vaginitis due to Candida albicans    Acute vaginitis 02/06/2015    Bacterial vaginosis    Anxiety disorder, unspecified     Anxiety    Bipolar disorder, unspecified (Multi)     Bipolar disorder    Dysmenorrhea, unspecified 01/04/2016    Menstrual cramp    Encounter for follow-up examination after completed treatment for conditions other than malignant neoplasm 01/31/2018    Postoperative examination    Encounter for other general counseling and advice on contraception 05/09/2017    Birth control counseling    Encounter for screening for malignant neoplasm of cervix 03/21/2017    Cervical cancer screening    Mastitis without abscess 02/26/2018    Cellulitis  of left breast    Obesity, unspecified 02/26/2018    Class 1 obesity    Personal history of other diseases of the female genital tract 07/25/2017    History of abnormal uterine bleeding    Personal history of other endocrine, nutritional and metabolic disease 12/08/2015    History of type 2 diabetes mellitus    Personal history of other infectious and parasitic diseases 05/09/2017    History of trichomoniasis    Personal history of other specified conditions 01/31/2018    History of urinary incontinence    Personal history of other specified conditions 07/25/2017    History of urinary incontinence    Post-traumatic stress disorder, unspecified     PTSD (post-traumatic stress disorder)    Type 2 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 (Multi) 08/07/2018    DM (diabetes mellitus), type 2 with neurological complications    Unspecified urinary incontinence     Bladder leak     Surgical History  Past Surgical History:   Procedure Laterality Date    BREAST SURGERY  09/02/2015    Breast Surgery Reduction Procedure    MR HEAD ANGIO WO IV CONTRAST  6/15/2023    MR HEAD ANGIO WO IV CONTRAST 6/15/2023 [facility] MRI    MR NECK ANGIO WO IV CONTRAST  6/15/2023    MR NECK ANGIO WO IV CONTRAST 6/15/2023 [facility] MRI    OTHER SURGICAL HISTORY  01/31/2018    Laparoscopy With Total Hysterectomy For Uterus > 250g    OTHER SURGICAL HISTORY  01/31/2018    Oviductal Surgery Bilateral Salpingectomy    TUBAL LIGATION  09/02/2015    Tubal Ligation     Social History  Social History     Tobacco Use    Smoking status: Every Day     Types: Cigarettes    Smokeless tobacco: Never   Substance Use Topics    Alcohol use: Yes     Comment: Socil    Drug use: Yes     Types: Marijuana     Allergies  Hay fever and allergy relief and Pollen extracts  Home Medications  (Not in a hospital admission)      Review of Systems  Neurological Exam  Physical Exam    GENERAL APPEARANCE:  No distress, alert and cooperative.   CARDIOVASCULAR: Regular,  rate and rhythm, without murmur. No carotid bruits.   MENTAL STATE:  Orientation was normal to time, place and person. Language was notable for delays in speech as well as word finding difficulty. Expressive > receptive aphasia.   CRANIAL NERVES:  Cranial nerves were normal.      CN 2- Visual fields full to confrontation.      CN 3, 4, 6-  Pupils round, 4 mm in diameter, equally reactive to light. No ptosis. EOMs normal alignment, full range of movement, no nystagmus     CN 5- Facial sensation intact bilaterally. Normal corneal reflexes.      CN 7- Mild R FD.     CN 8- Hearing intact to finger rub, whisper.      CN 9- Palate elevates symmetrically. Dysarthric with hoarseness.      CN 11- Normal strength of shoulder shrug and neck turning      CN 12- Tongue midline, with normal bulk and strength; no fasciculations.   MOTOR:  Noted to have mild RUE spasticity. Muscle strength was 5/5 in distal and proximal muscles in both LUE and LLE. Grossly 4/5 strength in RLE and RUE. No fasciculations, tremor or other abnormal movements were present.   REFLEXES:  Right/ Left:  Biceps 2/2, brachioradialis 2/2, triceps 2/2, patellar 2/2, ankle 2/2  Babinski: toes downgoing to plantar stimulation. No clonus, frontal release signs or other pathologic reflexes present.   SENSORY: Dense hemibody sensory loss on R.   COORDINATION:  FNF intact BL.     GAIT: Able to stand and appears to fall to R while trying to walk unassisted.      NIHSS on Arrival:  1a  Level of consciousness: 0=alert; keenly responsive   1b. LOC questions:  0=Performs both tasks correctly   1c. LOC commands: 0=Performs both tasks correctly   2.  Best Gaze: 0=normal   3. Visual: 0=No visual loss   4. Facial Palsy: 1=Minor paralysis (flattened nasolabial fold, asymmetric on smiling)   5a. Motor Left Arm: 0=No drift, limb holds 90 (or 45) degrees for full 10 seconds   5b.  Motor Right Arm: 1=Drift, limb holds 90 (or 45) degrees but drifts down before full 10 seconds: does  "not hit bed   6a. Motor Left Le=No drift, limb holds 90 (or 45) degrees for full 10 seconds   6b  Motor Right Le=Some effort against gravity, limb cannot get to or maintain (if cured) 90 (or 45) degrees, drifts down to bed, but has some effort against gravity   7. Limb Ataxia: 0=Absent   8.  Sensory: 1=Mild to moderate sensory loss; patient feels pinprick is less sharp or is dull on the affected side; there is a loss of superficial pain with pinprick but patient is aware She is being touched   9. Best Language:  1=Mild to moderate aphasia; some obvious loss of fluency or facility of comprehension without significant limitation on ideas expressed or form of expression.   10. Dysarthria: 1=Mild to moderate, patient slurs at least some words and at worst, can be understood with some difficulty   11. Extinction and Inattention: 0=No abnormality          Total:   7       Last Recorded Vitals  Blood pressure (!) 169/104, pulse 99, temperature 36.4 °C (97.6 °F), temperature source Temporal, resp. rate 18, height 1.651 m (5' 5\"), weight 85.7 kg (189 lb), SpO2 100%.          Relevant Results      NIH Stroke Scale  1A. Level of Consciousness: Alert, Keenly Responsive  1B. Ask Month and Age: Both Questions Right  1C. Blink Eyes & Squeeze Hands: Performs Both Tasks  2. Best Gaze: Normal  3. Visual: No Visual Loss  4. Facial Palsy: Minor Paralysis  5A. Motor - Left Arm: No Drift  5B. Motor - Right Arm: Drift  6A. Motor - Left Leg: No Drift  6B. Motor - Right Leg: Some Effort Against Gravity  7. Limb Ataxia: Absent  8. Sensory Loss: Mild-to-Moderate Sensory Loss  9. Best Language: Mild-to-Moderate Aphasia  10. Dysarthria: Mild-to-Moderate Dysarthria  11. Extinction and Inattention: No Abnormality  NIH Stroke Scale: 7  MR brain w and wo IV contrast    Result Date: 2024  Interpreted By:  Sahara Quintanilla and Marshall Colin STUDY: MR BRAIN W AND WO IV CONTRAST; MR ANGIO HEAD WO IV CONTRAST; MR ANGIO NECK WO IV " CONTRAST;  5/3/2024 11:04 pm   INDICATION: Signs/Symptoms:Dysarthria, RUE weakness; prior stroke; Signs/Symptoms:RUE weakness, slurred speech; prior stroke.   COMPARISON: MRI brain and MRA head and neck dated 06/15/2023.   ACCESSION NUMBER(S): [id number]; [id number]; [id number]   ORDERING CLINICIAN: BENNY MOLINA   TECHNIQUE: Multiplanar and multisequence MRI of the brain was performed before and after intravenous administration of 17 mL of Dotarem gadolinium contrast.   Time-of-flight MRA of the head  and neck was performed. The images were reviewed as source images and maximum intensity projections.   FINDINGS: Brain:   New focus of diffusion restriction is present along the lateral aspect of the left thalamus (series 18, image 58) the expected location of the posterior limb of the left internal capsule with associated mild hyperintense FLAIR and T2 signal changes, suggestive of acute to early subacute infarct. Additional small focus of diffusion restriction is present in the left anterior subinsular region (series 18, image 62) with mild associated FLAIR signal changes, suggestive of additional acute to early subacute infarct. There is no associated enhancement.   Mild hyperintense DWI signal is present in the periventricular white matter along the atrium of the left lateral ventricle (series 18, image 56) with associated diffusion restriction and some postcontrast enhancement (series 21, image 45).   Some hyperintense DWI signal is present in the posterior body of the right corpus callosum (series 18, image 52), with subtle associated postcontrast enhancement (series 23, image 39) significantly decreased in size from prior examination in June of 2023.   No additional areas of diffusion restriction or hyperintense DWI signal are identified intracranially.   There is no evidence of intracranial hemorrhage. No mass effect or midline shift is present.   No ventricular dilatation is present. Basal cisterns are  patent. No extra-axial fluid collections are identified.   Patchy FLAIR and T2 signal abnormalities are present in the periventricular and subcortical white matter of bilateral cerebral hemispheres, demonstrate somewhat perpendicular appearance relative to the corpus callosum. No masslike enhancement is identified in the intracranially.   Small amount of mucus/secretions are present in the inferior maxillary sinuses bilaterally. No abnormal fluid signal is present in the mastoid air cells.   MRA of head:   Intracranial carotid arteries demonstrate symmetric flow enhancement without evidence of focal occlusion.   Anterior cerebral arteries proximally demonstrate symmetric flow enhancement without evidence of occlusion.   Middle cerebral arteries demonstrate symmetric flow enhancement proximally without evidence of occlusion.   Intracranial vertebral arteries, basilar artery and the posterior cerebral arteries do not demonstrate any new occlusion. There is segmental decreased flow signal again present in the left P2 segment, likely corresponding to area of moderate stenosis described on previous MRI in June of 2023 and CTA in June of 2023.   MRA of neck:   Common carotid arteries demonstrate symmetric flow enhancement without evidence of occlusion.   Visualized extracranial internal carotid arteries demonstrate symmetric flow enhancement without evidence of occlusion.   Extracranial vertebral arteries demonstrate symmetric flow enhancement without evidence of occlusion.       MRI Brain: 1. Foci of abnormal diffusion restriction within the posterior limb of the left internal capsule and the white matter of the left subinsular region with mild associated FLAIR hyperintense edema but no postcontrast enhancement are favored to represent acute to early subacute infarcts. 2. Additional focus of diffusion restriction with some postcontrast enhancement is present within the periventricular white matter of the left atrium,  posterolateral to the left thalamus, possibly representing a subacute infarct, although some most suspicious for a demyelinating plaque. Multiple sclerosis workup is recommended. 3. Area of diffusion restriction described on previous MRI in June of 2023 in the posterior body of the right corpus callosum demonstrates faint residual hyperintense DWI signal with interval volume loss since prior study, although there is some faint enhancement present on postcontrast images, suspicious for demyelinating plaque. 4. Similar appearance of numerous supratentorial and brainstem lesions some of which are oriented perpendicular to the lateral ventricles suggestive of a demyelinating process such as multiple sclerosis. 5. A 0.5 cm focus of enhancement within the left caudate nucleus without associated diffusion restriction or signal abnormality. Finding is indeterminate and may be vascular in etiology.   MRA: 1. Unchanged segmental decreased flow in the left P2 segment of the PCA corresponding to the similar location visualized on prior CT angio head corresponding to focal moderate stenosis. 2. Otherwise, no significant stenosis or arterial cutoff of the major intracranial vessels are within the neck.   I personally reviewed the images/study and I agree with the resident findings as stated. This study was interpreted at [place], [place].   MACRO: None   Signed by: Sahara Quintanilla 5/4/2024 1:50 AM Dictation workstation:   [id number]   No CT head results found for the past 14 days  No echocardiogram results found for the past 14 days        BNP   Date/Time Value Ref Range Status   08/03/2023 11:22 AM 2 0 - 99 pg/mL Final     Comment:     .  <100 pg/mL - Heart failure unlikely  100-299 pg/mL - Intermediate probability of acute heart  .               failure exacerbation. Correlate with clinical  .               context and patient history.    >=300 pg/mL - Heart Failure likely.  Correlate with clinical  .               context and patient history.  BNP testing is performed using different testing   methodology at [facility] than at other   [facility] hospitals. Direct result comparisons should   only be made within the same method.       I have personally reviewed the following imaging results CT brain attack head wo IV contrast    Result Date: 5/5/2024  Interpreted By:  Ximena Stanton  and Isamar Wise STUDY: CT BRAIN ATTACK HEAD WO IV CONTRAST; CT BRAIN ATTACK ANGIO HEAD AND NECK W AND WO IV CONTRAST;  5/5/2024 12:03 pm; 5/5/2024 11:59 am   INDICATION: Signs/Symptoms:Aphaisa; gait issues; R weakness; Signs/Symptoms:Concern for L MCA.   COMPARISON: CT head 05/03/2024 MR brain 05/03/2024, and MR angio head and neck 05/03/2024.   ACCESSION NUMBER(S): [id number]; [id number]   ORDERING CLINICIAN: PHILIPPE BALDERAS   TECHNIQUE: Unenhanced CT images of the head were obtained. Subsequently,  80 mL of Omnipaque 350 was administered intravenously and axial images of the head and neck were acquired.  Coronal, and sagittal, reconstructions were provided for review.   FINDINGS: CT HEAD FINDINGS:   CSF Spaces: The ventricles, sulci and basal cisterns are within normal limits. There is no extraaxial fluid collection.   Parenchyma: No acute intracranial hemorrhage or mass effect. Hypodensity within the left posterior limb internal capsule and left periventricular white matter compatible with acute infarct on MRI 05/03/2024. Mild degree of nonspecific white matter hypodensity. Please see MRI 05/03/2024 for further details. Gray-white differentiation is otherwise intact. No midline shift.   Calvarium: The calvarium is unremarkable.   Paranasal sinuses and mastoids: Mucosal retention cysts within the left posterior ethmoid air cells and left maxillary sinus. Otherwise, visualized paranasal sinuses and mastoids are clear.     CTA HEAD FINDINGS:   Anterior circulation: The  bilateral intracranial internal carotid arteries, bilateral carotid terminals, bilateral proximal anterior and middle cerebral arteries are patent.   Posterior circulation: Bilateral intracranial vertebral arteries, vertebrobasilar junction, basilar artery and proximal posterior cerebral arteries are patent. Redemonstrated is moderate narrowing of the P2 segment on the left.     CTA NECK FINDINGS:   Right carotid vessels: The common carotid artery is normal. The carotid bifurcation is normal. The internal carotid artery in the neck is normal. There is no significant stenosis .   Left carotid vessels: The common carotid artery is normal. The carotid bifurcation is normal. The internal carotid artery in the neck is normal. There is no significant stenosis .   Vertebral vessels:  The visualized segments of the cervical vertebral arteries are normal in caliber.         CT head:   No acute intracranial hemorrhage or mass effect.   Hypodensity within the left posterior limb internal capsule and left periventricular white matter compatible with infarct on MRI 05/03/2024.   Nonspecific white matter hypodensity. Please see MRI 05/03/2024 for further details.   CTA head:   Similar moderate stenosis of the P2 segment on the left. Otherwise no significant stenosis or large vessel cutoff.   CTA neck:   No significant stenosis of the cervical vessels.   I personally reviewed the images/study and I agree with the findings as stated by Franyd Pimentel MD (Radiology Resident). This study was interpreted at [facility], [place], [place].   MACRO: None   Signed by: Ximena Stanton 5/5/2024 12:36 PM Dictation workstation:   [id number]    CT brain attack angio head and neck W and WO IV contrast    Result Date: 5/5/2024  Interpreted By:  Ximena Stanton,  and Isamar Wise STUDY: CT BRAIN ATTACK HEAD WO IV CONTRAST; CT BRAIN ATTACK ANGIO HEAD AND NECK W AND WO IV CONTRAST;   5/5/2024 12:03 pm; 5/5/2024 11:59 am   INDICATION: Signs/Symptoms:Aphaisa; gait issues; R weakness; Signs/Symptoms:Concern for L MCA.   COMPARISON: CT head 05/03/2024 MR brain 05/03/2024, and MR angio head and neck 05/03/2024.   ACCESSION NUMBER(S): [id number]; [id number]   ORDERING CLINICIAN: PHILIPPE BALDERAS   TECHNIQUE: Unenhanced CT images of the head were obtained. Subsequently,  80 mL of Omnipaque 350 was administered intravenously and axial images of the head and neck were acquired.  Coronal, and sagittal, reconstructions were provided for review.   FINDINGS: CT HEAD FINDINGS:   CSF Spaces: The ventricles, sulci and basal cisterns are within normal limits. There is no extraaxial fluid collection.   Parenchyma: No acute intracranial hemorrhage or mass effect. Hypodensity within the left posterior limb internal capsule and left periventricular white matter compatible with acute infarct on MRI 05/03/2024. Mild degree of nonspecific white matter hypodensity. Please see MRI 05/03/2024 for further details. Gray-white differentiation is otherwise intact. No midline shift.   Calvarium: The calvarium is unremarkable.   Paranasal sinuses and mastoids: Mucosal retention cysts within the left posterior ethmoid air cells and left maxillary sinus. Otherwise, visualized paranasal sinuses and mastoids are clear.     CTA HEAD FINDINGS:   Anterior circulation: The bilateral intracranial internal carotid arteries, bilateral carotid terminals, bilateral proximal anterior and middle cerebral arteries are patent.   Posterior circulation: Bilateral intracranial vertebral arteries, vertebrobasilar junction, basilar artery and proximal posterior cerebral arteries are patent. Redemonstrated is moderate narrowing of the P2 segment on the left.     CTA NECK FINDINGS:   Right carotid vessels: The common carotid artery is normal. The carotid bifurcation is normal. The internal carotid artery in the neck is normal. There is no significant  stenosis .   Left carotid vessels: The common carotid artery is normal. The carotid bifurcation is normal. The internal carotid artery in the neck is normal. There is no significant stenosis .   Vertebral vessels:  The visualized segments of the cervical vertebral arteries are normal in caliber.         CT head:   No acute intracranial hemorrhage or mass effect.   Hypodensity within the left posterior limb internal capsule and left periventricular white matter compatible with infarct on MRI 05/03/2024.   Nonspecific white matter hypodensity. Please see MRI 05/03/2024 for further details.   CTA head:   Similar moderate stenosis of the P2 segment on the left. Otherwise no significant stenosis or large vessel cutoff.   CTA neck:   No significant stenosis of the cervical vessels.   I personally reviewed the images/study and I agree with the findings as stated by Frandy Pimentel MD (Radiology Resident). This study was interpreted at [place], [place].   MACRO: None   Signed by: Ximena Stanton 5/5/2024 12:36 PM Dictation workstation:   [id number]    MR brain w and wo IV contrast    Result Date: 5/4/2024  Interpreted By:  Sahara Quintanilla and Marshall Colin STUDY: MR BRAIN W AND WO IV CONTRAST; MR ANGIO HEAD WO IV CONTRAST; MR ANGIO NECK WO IV CONTRAST;  5/3/2024 11:04 pm   INDICATION: Signs/Symptoms:Dysarthria, RUE weakness; prior stroke; Signs/Symptoms:RUE weakness, slurred speech; prior stroke.   COMPARISON: MRI brain and MRA head and neck dated 06/15/2023.   ACCESSION NUMBER(S): [id number]; [id number]; [id number]   ORDERING CLINICIAN: BENNY MOLINA   TECHNIQUE: Multiplanar and multisequence MRI of the brain was performed before and after intravenous administration of 17 mL of Dotarem gadolinium contrast.   Time-of-flight MRA of the head  and neck was performed. The images were reviewed as source images and maximum intensity projections.   FINDINGS:  Brain:   New focus of diffusion restriction is present along the lateral aspect of the left thalamus (series 18, image 58) the expected location of the posterior limb of the left internal capsule with associated mild hyperintense FLAIR and T2 signal changes, suggestive of acute to early subacute infarct. Additional small focus of diffusion restriction is present in the left anterior subinsular region (series 18, image 62) with mild associated FLAIR signal changes, suggestive of additional acute to early subacute infarct. There is no associated enhancement.   Mild hyperintense DWI signal is present in the periventricular white matter along the atrium of the left lateral ventricle (series 18, image 56) with associated diffusion restriction and some postcontrast enhancement (series 21, image 45).   Some hyperintense DWI signal is present in the posterior body of the right corpus callosum (series 18, image 52), with subtle associated postcontrast enhancement (series 23, image 39) significantly decreased in size from prior examination in June of 2023.   No additional areas of diffusion restriction or hyperintense DWI signal are identified intracranially.   There is no evidence of intracranial hemorrhage. No mass effect or midline shift is present.   No ventricular dilatation is present. Basal cisterns are patent. No extra-axial fluid collections are identified.   Patchy FLAIR and T2 signal abnormalities are present in the periventricular and subcortical white matter of bilateral cerebral hemispheres, demonstrate somewhat perpendicular appearance relative to the corpus callosum. No masslike enhancement is identified in the intracranially.   Small amount of mucus/secretions are present in the inferior maxillary sinuses bilaterally. No abnormal fluid signal is present in the mastoid air cells.   MRA of head:   Intracranial carotid arteries demonstrate symmetric flow enhancement without evidence of focal occlusion.    Anterior cerebral arteries proximally demonstrate symmetric flow enhancement without evidence of occlusion.   Middle cerebral arteries demonstrate symmetric flow enhancement proximally without evidence of occlusion.   Intracranial vertebral arteries, basilar artery and the posterior cerebral arteries do not demonstrate any new occlusion. There is segmental decreased flow signal again present in the left P2 segment, likely corresponding to area of moderate stenosis described on previous MRI in June of 2023 and CTA in June of 2023.   MRA of neck:   Common carotid arteries demonstrate symmetric flow enhancement without evidence of occlusion.   Visualized extracranial internal carotid arteries demonstrate symmetric flow enhancement without evidence of occlusion.   Extracranial vertebral arteries demonstrate symmetric flow enhancement without evidence of occlusion.       MRI Brain: 1. Foci of abnormal diffusion restriction within the posterior limb of the left internal capsule and the white matter of the left subinsular region with mild associated FLAIR hyperintense edema but no postcontrast enhancement are favored to represent acute to early subacute infarcts. 2. Additional focus of diffusion restriction with some postcontrast enhancement is present within the periventricular white matter of the left atrium, posterolateral to the left thalamus, possibly representing a subacute infarct, although some most suspicious for a demyelinating plaque. Multiple sclerosis workup is recommended. 3. Area of diffusion restriction described on previous MRI in June of 2023 in the posterior body of the right corpus callosum demonstrates faint residual hyperintense DWI signal with interval volume loss since prior study, although there is some faint enhancement present on postcontrast images, suspicious for demyelinating plaque. 4. Similar appearance of numerous supratentorial and brainstem lesions some of which are oriented perpendicular  to the lateral ventricles suggestive of a demyelinating process such as multiple sclerosis. 5. A 0.5 cm focus of enhancement within the left caudate nucleus without associated diffusion restriction or signal abnormality. Finding is indeterminate and may be vascular in etiology.   MRA: 1. Unchanged segmental decreased flow in the left P2 segment of the PCA corresponding to the similar location visualized on prior CT angio head corresponding to focal moderate stenosis. 2. Otherwise, no significant stenosis or arterial cutoff of the major intracranial vessels are within the neck.   I personally reviewed the images/study and I agree with the resident findings as stated. This study was interpreted at [place], [place].   MACRO: None   Signed by: Sahara Quintanilla 5/4/2024 1:50 AM Dictation workstation:   [id number]    MR angio head wo IV contrast    Result Date: 5/4/2024  Interpreted By:  Sahara Quintanilla and Marshall Colin STUDY: MR BRAIN W AND WO IV CONTRAST; MR ANGIO HEAD WO IV CONTRAST; MR ANGIO NECK WO IV CONTRAST;  5/3/2024 11:04 pm   INDICATION: Signs/Symptoms:Dysarthria, RUE weakness; prior stroke; Signs/Symptoms:RUE weakness, slurred speech; prior stroke.   COMPARISON: MRI brain and MRA head and neck dated 06/15/2023.   ACCESSION NUMBER(S): [id number]; [id number]; [id number]   ORDERING CLINICIAN: BENNY MOLINA   TECHNIQUE: Multiplanar and multisequence MRI of the brain was performed before and after intravenous administration of 17 mL of Dotarem gadolinium contrast.   Time-of-flight MRA of the head  and neck was performed. The images were reviewed as source images and maximum intensity projections.   FINDINGS: Brain:   New focus of diffusion restriction is present along the lateral aspect of the left thalamus (series 18, image 58) the expected location of the posterior limb of the left internal capsule with associated mild hyperintense FLAIR and T2  signal changes, suggestive of acute to early subacute infarct. Additional small focus of diffusion restriction is present in the left anterior subinsular region (series 18, image 62) with mild associated FLAIR signal changes, suggestive of additional acute to early subacute infarct. There is no associated enhancement.   Mild hyperintense DWI signal is present in the periventricular white matter along the atrium of the left lateral ventricle (series 18, image 56) with associated diffusion restriction and some postcontrast enhancement (series 21, image 45).   Some hyperintense DWI signal is present in the posterior body of the right corpus callosum (series 18, image 52), with subtle associated postcontrast enhancement (series 23, image 39) significantly decreased in size from prior examination in June of 2023.   No additional areas of diffusion restriction or hyperintense DWI signal are identified intracranially.   There is no evidence of intracranial hemorrhage. No mass effect or midline shift is present.   No ventricular dilatation is present. Basal cisterns are patent. No extra-axial fluid collections are identified.   Patchy FLAIR and T2 signal abnormalities are present in the periventricular and subcortical white matter of bilateral cerebral hemispheres, demonstrate somewhat perpendicular appearance relative to the corpus callosum. No masslike enhancement is identified in the intracranially.   Small amount of mucus/secretions are present in the inferior maxillary sinuses bilaterally. No abnormal fluid signal is present in the mastoid air cells.   MRA of head:   Intracranial carotid arteries demonstrate symmetric flow enhancement without evidence of focal occlusion.   Anterior cerebral arteries proximally demonstrate symmetric flow enhancement without evidence of occlusion.   Middle cerebral arteries demonstrate symmetric flow enhancement proximally without evidence of occlusion.   Intracranial vertebral arteries,  basilar artery and the posterior cerebral arteries do not demonstrate any new occlusion. There is segmental decreased flow signal again present in the left P2 segment, likely corresponding to area of moderate stenosis described on previous MRI in June of 2023 and CTA in June of 2023.   MRA of neck:   Common carotid arteries demonstrate symmetric flow enhancement without evidence of occlusion.   Visualized extracranial internal carotid arteries demonstrate symmetric flow enhancement without evidence of occlusion.   Extracranial vertebral arteries demonstrate symmetric flow enhancement without evidence of occlusion.       MRI Brain: 1. Foci of abnormal diffusion restriction within the posterior limb of the left internal capsule and the white matter of the left subinsular region with mild associated FLAIR hyperintense edema but no postcontrast enhancement are favored to represent acute to early subacute infarcts. 2. Additional focus of diffusion restriction with some postcontrast enhancement is present within the periventricular white matter of the left atrium, posterolateral to the left thalamus, possibly representing a subacute infarct, although some most suspicious for a demyelinating plaque. Multiple sclerosis workup is recommended. 3. Area of diffusion restriction described on previous MRI in June of 2023 in the posterior body of the right corpus callosum demonstrates faint residual hyperintense DWI signal with interval volume loss since prior study, although there is some faint enhancement present on postcontrast images, suspicious for demyelinating plaque. 4. Similar appearance of numerous supratentorial and brainstem lesions some of which are oriented perpendicular to the lateral ventricles suggestive of a demyelinating process such as multiple sclerosis. 5. A 0.5 cm focus of enhancement within the left caudate nucleus without associated diffusion restriction or signal abnormality. Finding is indeterminate and  may be vascular in etiology.   MRA: 1. Unchanged segmental decreased flow in the left P2 segment of the PCA corresponding to the similar location visualized on prior CT angio head corresponding to focal moderate stenosis. 2. Otherwise, no significant stenosis or arterial cutoff of the major intracranial vessels are within the neck.   I personally reviewed the images/study and I agree with the resident findings as stated. This study was interpreted at [facility], [place], [place].   MACRO: None   Signed by: Sahara Quintanilla 5/4/2024 1:50 AM Dictation workstation:   [id number]    MR angio neck wo IV contrast    Result Date: 5/4/2024  Interpreted By:  Sahara Quintanilla and Marshall Colin STUDY: MR BRAIN W AND WO IV CONTRAST; MR ANGIO HEAD WO IV CONTRAST; MR ANGIO NECK WO IV CONTRAST;  5/3/2024 11:04 pm   INDICATION: Signs/Symptoms:Dysarthria, RUE weakness; prior stroke; Signs/Symptoms:RUE weakness, slurred speech; prior stroke.   COMPARISON: MRI brain and MRA head and neck dated 06/15/2023.   ACCESSION NUMBER(S): [id number]; [id number]; [id number]   ORDERING CLINICIAN: BENNY MOLINA   TECHNIQUE: Multiplanar and multisequence MRI of the brain was performed before and after intravenous administration of 17 mL of Dotarem gadolinium contrast.   Time-of-flight MRA of the head  and neck was performed. The images were reviewed as source images and maximum intensity projections.   FINDINGS: Brain:   New focus of diffusion restriction is present along the lateral aspect of the left thalamus (series 18, image 58) the expected location of the posterior limb of the left internal capsule with associated mild hyperintense FLAIR and T2 signal changes, suggestive of acute to early subacute infarct. Additional small focus of diffusion restriction is present in the left anterior subinsular region (series 18, image 62) with mild associated FLAIR signal changes, suggestive of additional  acute to early subacute infarct. There is no associated enhancement.   Mild hyperintense DWI signal is present in the periventricular white matter along the atrium of the left lateral ventricle (series 18, image 56) with associated diffusion restriction and some postcontrast enhancement (series 21, image 45).   Some hyperintense DWI signal is present in the posterior body of the right corpus callosum (series 18, image 52), with subtle associated postcontrast enhancement (series 23, image 39) significantly decreased in size from prior examination in June of 2023.   No additional areas of diffusion restriction or hyperintense DWI signal are identified intracranially.   There is no evidence of intracranial hemorrhage. No mass effect or midline shift is present.   No ventricular dilatation is present. Basal cisterns are patent. No extra-axial fluid collections are identified.   Patchy FLAIR and T2 signal abnormalities are present in the periventricular and subcortical white matter of bilateral cerebral hemispheres, demonstrate somewhat perpendicular appearance relative to the corpus callosum. No masslike enhancement is identified in the intracranially.   Small amount of mucus/secretions are present in the inferior maxillary sinuses bilaterally. No abnormal fluid signal is present in the mastoid air cells.   MRA of head:   Intracranial carotid arteries demonstrate symmetric flow enhancement without evidence of focal occlusion.   Anterior cerebral arteries proximally demonstrate symmetric flow enhancement without evidence of occlusion.   Middle cerebral arteries demonstrate symmetric flow enhancement proximally without evidence of occlusion.   Intracranial vertebral arteries, basilar artery and the posterior cerebral arteries do not demonstrate any new occlusion. There is segmental decreased flow signal again present in the left P2 segment, likely corresponding to area of moderate stenosis described on previous MRI in  June of 2023 and CTA in June of 2023.   MRA of neck:   Common carotid arteries demonstrate symmetric flow enhancement without evidence of occlusion.   Visualized extracranial internal carotid arteries demonstrate symmetric flow enhancement without evidence of occlusion.   Extracranial vertebral arteries demonstrate symmetric flow enhancement without evidence of occlusion.       MRI Brain: 1. Foci of abnormal diffusion restriction within the posterior limb of the left internal capsule and the white matter of the left subinsular region with mild associated FLAIR hyperintense edema but no postcontrast enhancement are favored to represent acute to early subacute infarcts. 2. Additional focus of diffusion restriction with some postcontrast enhancement is present within the periventricular white matter of the left atrium, posterolateral to the left thalamus, possibly representing a subacute infarct, although some most suspicious for a demyelinating plaque. Multiple sclerosis workup is recommended. 3. Area of diffusion restriction described on previous MRI in June of 2023 in the posterior body of the right corpus callosum demonstrates faint residual hyperintense DWI signal with interval volume loss since prior study, although there is some faint enhancement present on postcontrast images, suspicious for demyelinating plaque. 4. Similar appearance of numerous supratentorial and brainstem lesions some of which are oriented perpendicular to the lateral ventricles suggestive of a demyelinating process such as multiple sclerosis. 5. A 0.5 cm focus of enhancement within the left caudate nucleus without associated diffusion restriction or signal abnormality. Finding is indeterminate and may be vascular in etiology.   MRA: 1. Unchanged segmental decreased flow in the left P2 segment of the PCA corresponding to the similar location visualized on prior CT angio head corresponding to focal moderate stenosis. 2. Otherwise, no  significant stenosis or arterial cutoff of the major intracranial vessels are within the neck.   I personally reviewed the images/study and I agree with the resident findings as stated. This study was interpreted at [place], [place].   MACRO: None   Signed by: Sahara Quintanilla 5/4/2024 1:50 AM Dictation workstation:   [id number]    ECG 12 lead    Result Date: 5/4/2024  Sinus rhythm with short W[place] Possible Anterior infarct (cited on or before 03-MAY-2024) Abnormal ECG When compared with ECG of 03-AUG-2023 11:03, Previous ECG has undetermined rhythm, needs review See ED provider note for full interpretation and clinical correlation Confirmed by Radha Carolina ([id number]) on 5/4/2024 1:18:40 AM    XR chest 1 view    Result Date: 5/3/2024  Interpreted By:  Sahara Quintanilla and Ritchie Brandon STUDY: XR CHEST 1 VIEW;  5/3/2024 5:34 pm   INDICATION: Signs/Symptoms:bat.   COMPARISON: Chest x-ray 08/03/2023.   ACCESSION NUMBER(S): [id number]   ORDERING CLINICIAN: SELMA CEBALLOS   FINDINGS: AP radiograph of the chest was provided.   CARDIOMEDIASTINAL SILHOUETTE: Cardiomediastinal silhouette is upper limits of normal in size, similar in appearance to prior exam.   LUNGS: No focal consolidation, pleural effusion, or pneumothorax.   ABDOMEN: No remarkable upper abdominal findings.   BONES: No acute osseous changes.       1.  No evidence of acute cardiopulmonary process.   I personally reviewed the images/study and I agree with the findings as stated by resident Taiwo Maguire. This study was interpreted at [place], [place].   MACRO: None   Signed by: Sahara Quintanilla 5/3/2024 7:06 PM Dictation workstation:   [id number]    CT brain attack head wo IV contrast    Result Date: 5/3/2024  Interpreted By:  Masha Vo, STUDY: CT BRAIN ATTACK HEAD WO IV CONTRAST;  5/3/2024 4:42 pm   INDICATION: Signs/Symptoms:BAT.    COMPARISON: June 14, 2023   ACCESSION NUMBER(S): [id number]   ORDERING CLINICIAN: TATIANNA FORREST   TECHNIQUE: Noncontrast axial CT scan of head was performed. Angled reformats in brain and bone windows were generated. The images were reviewed in bone, brain, blood and soft tissue windows. Coronal and sagittal reformats were provided for review.   FINDINGS: CSF Spaces: The ventricles, sulci and basal cisterns are within normal limits. There is no extraaxial fluid collection.   Parenchyma: There is nonspecific diminished attenuation in the subcortical and periventricular white matter. There is again asymmetric hypodensity in the left corona radiata and extending into the posterior limb of the internal capsule. There are small hypodensities in the bilateral basal ganglia, thalami, and external capsules which may represent lacunar infarcts or perivascular spaces. There are also areas of encephalomalacia in the right corpus callosum corresponding to areas of diffusion restriction on prior MRI from [place] 15, 2023. Otherwise, the grey-white differentiation is intact. There is no mass effect or midline shift.  There is no intracranial hemorrhage.   Calvarium: The calvarium is unremarkable.   Paranasal sinuses and mastoids: There is a small retention cyst or polyp in the left maxillary sinus. The mastoid air cells are clear.       Hypodensities within the basal ganglia, thalami, external capsules, and left corona radiata extending into the posterior limb of the left internal capsule are relatively similar from June 14, 2023 and could reflect areas of previous ischemic injury. There is also encephalomalacia in the right corpus callosum. MRI with diffusion-weighted imaging would be a much more sensitive means of assessing for acute infarct. There is no definitive evidence of acute cortical infarct.     MACRO: Masha Vo discussed the significance and urgency of this critical finding by telephone with  TATIANNA FORREST  on 5/3/2024 at 4:49 pm.  (**-RCF-**) Findings:  See findings.     Signed by: Masha Vo 5/3/2024 4:52 PM Dictation workstation:   [id number]  .     Stroke Alert CT/MRI review: Was interpreted as it was being performed     IV Thrombolysis  IV Thrombolysis Given: No; Thrombolysis contraindication reason: Time from Last Known Well (or stroke onset) is >4.5 hours  and CT  with an obvious hypodensity represents irreversible injury      Assessment/Plan   Principal Problem:    Cerebrovascular accident (CVA), unspecified mechanism (Multi)    Tracey Stephen is a 43 y.o. RH female with a PMHx of BPD type II, NUBIA, insulin-dependent T2DM c/b retinopathy, HTN, prior R cingulate gyrus/corpus callosum and L frontal infarcts (2023) w/ residual RLE weakness, who presents to [facility] on 24 as a BAT for speech difficulty, worsening, R sided weakness.     She was already diagnosed with L PLIC stroke on 5/3 however left AMA. Treated with DAPT without load. Given she is worsening and has presumed small vessel disease stroke at this time we will treat with Plavix load as we believe this could be a stuttering lacunar infarct.     In regard to long term stroke prevention this is a patient who is of poor health literacy and poor medication compliance and given lack of any clear findings in regard to a hypercoagulable workup we would opt with trying to control her poorly controlled risk factors prior to escalation to anticoagulation.     Stroke Metrics:  EMS pre-notification?: Yes  Time of stroke team arrival: Within 5 minutes of page  Direct to CT?: Yes  Time of CTH read by stroke team: As performed    Stroke Classification:  Type: Ischemic stroke  Subtype/etiology:   Vessels involved: L MCA branches  Neurological manifestations:  Pre-Intervention Deficits: NIHSS 7  Pre-stroke mRS: 2  Initial treatment: Plavix  Anti-platelets or Anti-coagulation management: DAPT  Vascular Risk Factors: DM2, Smoking (THC),  HLD  Antiplatelet/antithrombotic plan for stroke prevention: DAPT 21 days then aspirin   VTE prophylaxis: Lovenox    Vascular Risk Factor modification goals:  Blood pressure goals: avoid hypotension SBP <100 that could worsen cerebral perfusion, Ischemic stroke- early permissive hypertension SBP < 220 mmHg with cautious inpatient lowering  Lipid Goals: education on healthy diet and statin therapy to maintain or achieve goal LDL-cholesterol < 70mg  Glucose Goals: early treatment of hyperglycemia to goal glucose 140-180 mg/dl with long-term goal A1c < 7%   Smoking Cessation and Education  Assessment for Rehabilitation needs   Patient and family education on signs and symptoms of stroke, calling 911, healthy strategies for stroke prevention.      Plan:    #Strokes   - Admit to floor with tele  - S/p Plavix 300 mg in ED  - Start Plavix 75 mg daily for next 21 days  - ASA 81mg daily  - Atorva 40mg QHS   - Give labetalol/hydralazine PRN for blood pressure  - TTE pending    #HTN  - EKG nsr  - Permissive HTN; SBP < 180-220    #HLD  - Atrovastatin 40 mg QHS    #IDDM2  - HbA1C 10.6%  - Mild SSI   - Q4H Glucose checks while NPO  - Diabetes educator consult in AM    #Bipolar Depression  #NUBIA  - Can establish with psychiatry as outpatient    F: Bolus PRN  E: K > 4; Phos > 3; Mg > 2  N: NPO  C: Full Code  A: PIV  P: Ez ZABALA (Mother Kiersten) [phone]             Yang Patiño MD

## 2024-05-05 NOTE — ED PROVIDER NOTES
History/Exam limitations: acuity of illness.   Additional history was obtained from patient, relative(s), and EMS personnel.    HPI:       Tracey Stephen is a 43 y.o. with a history of DM, HTN, NUBIA, BPD, prior strokes (R cingulate gyrus/corpus callosum and L frontal infarcts (5/2023) w/ residual RLE weakness), presenting to the ED as a BAT for worsening dysarthria and worsening R sided weakness. Patient symptoms initially began 5/2/24 at 22:00 and was activated as a BAT yesterday with an initial NIH of 3 for symptoms.  Patient was found to have L PLIC stroke but left AMA before admission. Pt returns today as son feels symptoms again worsened today at 5am. No new symptoms.      Last Known Well Time: 5/2/24 at 22:00        ------------------------------------------------------------------------------------------------------------------------------------------     Physical Exam:  T 36.4 °C (97.6 °F)  HR 99  BP (!) 169/104  RR 18  O2 100 % None (Room air)    Gen: Not in acute distress  Head/Neck: NCAT  Eyes: Anicteric sclerae, noninjected conjunctivae  Nose: No rhinorrhea  Mouth:  MMM  Heart: Regular rate and rhythm w/ no murmurs, rubs, gallops  Lungs: CTAB w/o wheezes, rales, rhonchi, no increased work of breathing  Abdomen: Soft, NT/ND  Musculoskeletal: No deformities  Extremities: No edema.  Neurologic: Please see below for NIHSS  Skin: No rashes noted  Psychological: Calm        Interval: Baseline  Time: 11:51 AM (5/5/24)  Person Administering Scale: Haley Peralta MD     1a  Level of consciousness: 0=alert; keenly responsive   1b. LOC questions:  0=Performs both tasks correctly   1c. LOC commands: 0=Performs both tasks correctly   2.  Best Gaze: 0=normal   3. Visual: 0=No visual loss   4. Facial Palsy: 1=Minor paralysis (flattened nasolabial fold, asymmetric on smiling)   5a. Motor left arm: 0=No drift, limb holds 90 (or 45) degrees for full 10 seconds   5b.  Motor right arm: 1=Drift, limb holds 90 (or 45)  degrees but drifts down before full 10 seconds: does not hit bed   6a. motor left le=No drift, limb holds 90 (or 45) degrees for full 10 seconds   6b  Motor right le=Some effort against gravity, limb cannot get to or maintain (if cured) 90 (or 45) degrees, drifts down to bed, but has some effort against gravity   7. Limb Ataxia: 0=Absent   8.  Sensory: 1=Mild to moderate sensory loss; patient feels pinprick is less sharp or is dull on the affected side; there is a loss of superficial pain with pinprick but patient is aware She is being touched   9. Best Language:  1=Mild to moderate aphasia; some obvious loss of fluency or facility of comprehension without significant limitation on ideas expressed or form of expression.   10. Dysarthria: 1=Mild to moderate, patient slurs at least some words and at worst, can be understood with some difficulty   11. Extinction and Inattention: 0=No abnormality     Total:   7         VAN: VAN: Positive     ------------------------------------------------------------------------------------------------------------------------------------------     Medical Decision Making:     Diagnoses as of 24   Cerebrovascular accident (CVA), unspecified mechanism (Multi)     This is a 43-year-old female with above-stated history including prior strokes presenting to the ED today for speech difficulty and worsening right-sided weakness.  Patient initial NIH 7 and van positive. No hypoglycemia. Patient taken directly to the CT scanner with neurostroke at bedside and had no signs of intracranial hemorrhage.  Per chart review, patient's true last known well was at 2200 on  where patient activated as a bat and found to have L PLIC stroke.  Patient was treated with dual antiplatelet therapy and plan for admission but patient left AMA.  Concern for stuttering lacunar infarct given progression of symptoms at this time.  Patient was loaded with Plavix per stroke recommendations.   Patient agreeable for admission at this time and will be admitted to the stroke service for further workup and management.  Patient resting comfortably at times of admission.       Independent Interpretation of Studies: I independently interpreted the CT head and see No obvious evidence of intracranial hemorrhage    Chronic Medical Conditions Significantly Affecting Care: BPD, NUBIA, IDDM, HTN, prior strokes    Social Determinants of Health Significantly Affecting Care: Food / housing insecurity, poor health literacy, hx non-compliance     External Records Reviewed: I reviewed recent and relevant outside records including: neuro stroke and ED notes from 5/2/24     Discussion of Management with Other Providers:   I discussed the patient/results with: stroke/neurology and the admitting team      IV Thrombolysis IV Thrombolysis Checklist        IV Thrombolysis Given: No; Thrombolysis contraindication reason: Time from Last Known Well (or stroke onset) is >4.5 hours         Procedure  Procedures         Haley Peralta MD  Resident  05/07/24 [id number]

## 2024-05-06 ENCOUNTER — APPOINTMENT (OUTPATIENT)
Dept: CARDIOLOGY | Facility: HOSPITAL | Age: 44
End: 2024-05-06
Payer: COMMERCIAL

## 2024-05-06 LAB
AORTIC VALVE PEAK VELOCITY: 1.17 M/S
AV PEAK GRADIENT: 5.4 MMHG
AVA (PEAK VEL): 3.01 CM2
EJECTION FRACTION APICAL 4 CHAMBER: 55.7
GLUCOSE BLD MANUAL STRIP-MCNC: 192 MG/DL (ref 74–99)
GLUCOSE BLD MANUAL STRIP-MCNC: 241 MG/DL (ref 74–99)
LEFT ATRIUM VOLUME AREA LENGTH INDEX BSA: 22.2 ML/M2
LEFT VENTRICLE INTERNAL DIMENSION DIASTOLE: 4.15 CM (ref 3.5–6)
LEFT VENTRICULAR OUTFLOW TRACT DIAMETER: 2.15 CM
LV EJECTION FRACTION BIPLANE: 57 %
MITRAL VALVE E/A RATIO: 0.66
MITRAL VALVE E/E' RATIO: 7.89
RIGHT VENTRICLE FREE WALL PEAK S': 12.11 CM/S
TRICUSPID ANNULAR PLANE SYSTOLIC EXCURSION: 1.9 CM

## 2024-05-06 PROCEDURE — 97161 PT EVAL LOW COMPLEX 20 MIN: CPT | Mod: GP

## 2024-05-06 PROCEDURE — 82947 ASSAY GLUCOSE BLOOD QUANT: CPT

## 2024-05-06 PROCEDURE — 2500000001 HC RX 250 WO HCPCS SELF ADMINISTERED DRUGS (ALT 637 FOR MEDICARE OP): Performed by: STUDENT IN AN ORGANIZED HEALTH CARE EDUCATION/TRAINING PROGRAM

## 2024-05-06 PROCEDURE — 2500000002 HC RX 250 W HCPCS SELF ADMINISTERED DRUGS (ALT 637 FOR MEDICARE OP, ALT 636 FOR OP/ED): Performed by: STUDENT IN AN ORGANIZED HEALTH CARE EDUCATION/TRAINING PROGRAM

## 2024-05-06 PROCEDURE — 1200000002 HC GENERAL ROOM WITH TELEMETRY DAILY

## 2024-05-06 PROCEDURE — 92610 EVALUATE SWALLOWING FUNCTION: CPT | Mod: GN | Performed by: SPEECH-LANGUAGE PATHOLOGIST

## 2024-05-06 PROCEDURE — 92523 SPEECH SOUND LANG COMPREHEN: CPT | Mod: GN | Performed by: SPEECH-LANGUAGE PATHOLOGIST

## 2024-05-06 PROCEDURE — 2500000004 HC RX 250 GENERAL PHARMACY W/ HCPCS (ALT 636 FOR OP/ED): Performed by: STUDENT IN AN ORGANIZED HEALTH CARE EDUCATION/TRAINING PROGRAM

## 2024-05-06 PROCEDURE — 93306 TTE W/DOPPLER COMPLETE: CPT | Performed by: INTERNAL MEDICINE

## 2024-05-06 PROCEDURE — 93306 TTE W/DOPPLER COMPLETE: CPT

## 2024-05-06 PROCEDURE — 92507 TX SP LANG VOICE COMM INDIV: CPT | Mod: GN | Performed by: SPEECH-LANGUAGE PATHOLOGIST

## 2024-05-06 PROCEDURE — 97530 THERAPEUTIC ACTIVITIES: CPT | Mod: GP

## 2024-05-06 RX ORDER — ONDANSETRON HYDROCHLORIDE 2 MG/ML
4 INJECTION, SOLUTION INTRAVENOUS EVERY 8 HOURS PRN
Status: DISCONTINUED | OUTPATIENT
Start: 2024-05-06 | End: 2024-05-08 | Stop reason: HOSPADM

## 2024-05-06 RX ORDER — ONDANSETRON 4 MG/1
4 TABLET, FILM COATED ORAL EVERY 8 HOURS PRN
Status: DISCONTINUED | OUTPATIENT
Start: 2024-05-06 | End: 2024-05-08 | Stop reason: HOSPADM

## 2024-05-06 RX ADMIN — ASPIRIN 81 MG: 81 TABLET, COATED ORAL at 09:19

## 2024-05-06 RX ADMIN — INSULIN LISPRO 2 UNITS: 100 INJECTION, SOLUTION INTRAVENOUS; SUBCUTANEOUS at 14:36

## 2024-05-06 RX ADMIN — INSULIN LISPRO 1 UNITS: 100 INJECTION, SOLUTION INTRAVENOUS; SUBCUTANEOUS at 09:22

## 2024-05-06 RX ADMIN — PERFLUTREN 2 ML OF DILUTION: 6.52 INJECTION, SUSPENSION INTRAVENOUS at 13:54

## 2024-05-06 RX ADMIN — CLOPIDOGREL BISULFATE 75 MG: 75 TABLET ORAL at 09:19

## 2024-05-06 RX ADMIN — ENOXAPARIN SODIUM 40 MG: 100 INJECTION SUBCUTANEOUS at 20:56

## 2024-05-06 RX ADMIN — ATORVASTATIN CALCIUM 40 MG: 40 TABLET, FILM COATED ORAL at 20:56

## 2024-05-06 RX ADMIN — POLYETHYLENE GLYCOL 3350 17 G: 17 POWDER, FOR SOLUTION ORAL at 09:18

## 2024-05-06 ASSESSMENT — COGNITIVE AND FUNCTIONAL STATUS - GENERAL
MOBILITY SCORE: 13
TURNING FROM BACK TO SIDE WHILE IN FLAT BAD: A LITTLE
MOVING FROM LYING ON BACK TO SITTING ON SIDE OF FLAT BED WITH BEDRAILS: A LITTLE
WALKING IN HOSPITAL ROOM: A LOT
STANDING UP FROM CHAIR USING ARMS: A LOT
MOVING TO AND FROM BED TO CHAIR: A LOT
CLIMB 3 TO 5 STEPS WITH RAILING: TOTAL

## 2024-05-06 ASSESSMENT — PAIN - FUNCTIONAL ASSESSMENT: PAIN_FUNCTIONAL_ASSESSMENT: 0-10

## 2024-05-06 ASSESSMENT — PAIN SCALES - GENERAL
PAINLEVEL_OUTOF10: 0 - NO PAIN

## 2024-05-06 NOTE — PROGRESS NOTES
Speech-Language Pathology  Adult Inpatient Clinical Bedside Swallow Evaluation    Patient Name: Tracey Stephen  MRN: [id number]  Today's Date: 5/6/2024   Start Time: 0930  Stop Time: 1000  Time Calculation (min): 30    History of Present Illness:   Tracey Stephen is a 43 y.o. RH female with a PMHx of BPD type II, NUBIA, insulin-dependent T2DM c/b retinopathy, HTN, prior R cingulate gyrus/corpus callosum and L frontal infarcts (5/2023) w/ residual RLE weakness, who presents to [facility] on 5/5/24 as a BAT for speech difficulty, worsening, R sided weakness.      LKW 2200 (5/2/24). Symptoms continued to persist on arrival.      Of note patient presented as BAT on 5/2/24 and was diagnosed with L PLIC stroke. Seen by Micheal Tomas and Patrick. Plan at that time was to admit for further workup, however patient left AMA to secure housing for herself.      Patient and patient's son able to give history at bedside. She went home after leaving AMA on 5/4 early AM. On 5/5 AM patient noted to have worsening weakness on R side with difficulty walking. Decided to come to ED for further care.        Assessment:   Clinical bedside swallow evaluation completed. A&Ox4. Daughter and mother at bedside for duration of exam. Expressive aphasia noted throughout exam; see full speech-language evaluation for details. Oral motor examination revealed mildly decreased right sided lingual, labial and buccal strength and ROM. Adequate volitional cough demonstrated prior to PO trials. Pt given and tolerated ice chips, tsp sips, straw sips and 3oz. Continuous sips of water (x2) with no overt clinical s/s of aspiration. No changes in vocal quality and/or respirations noted throughout exam. Adequate bolus formation/control given full oral clearance with puree and regular solid consistencies. SLP recommends continue Regular diet/Thin liquids. See additional PO intake guidelines outlined below. If pt demonstrates any change/decline in medical/mental/respiratory  status please make NPO and alert SLP. Will continue to follow to ensure diet tolerance and use of safe swallow guidelines. Nursing and MD aware of recommendations.     Recommendations:  Regular diet/Thin liquids  -upright at 90 degrees for all PO intake  -maintain upright at 90 degrees for all PO intake  -small bites/single sips  -slow rate of oral intake   -pills via pt preference  -if pt demonstrates any change/decline in medical/mental/respiratory status please make NPO and alert SLP  Goal:   Pt will tolerate least restrictive diet with no overt clinical s/s aspiration 100% of the time.   Pt will recall/utilize safe swallow guidelines as provided by % of the time.       Plan:  SLP Services Indicated: Yes  Frequency: 2x week  Discussed POC with patient, daughter, and mother  SLP - OK to Discharge    Pain:   0-10  0 = No pain.     Inpatient Education:  Extensive education provided to patient, daughter, and mother  regarding current swallow function, recommendations/results, and POC.      Consultations/Referrals/Coordination of Services:   N/A

## 2024-05-06 NOTE — PROGRESS NOTES
Physical Therapy    Physical Therapy Evaluation & Treatment    Patient Name: Tracey Stephen  MRN: [id number]  Today's Date: 5/6/2024   Time Calculation  Start Time: 1455  Stop Time: 1522  Time Calculation (min): 27 min    Assessment/Plan   PT Assessment  PT Assessment Results: Decreased strength, Impaired balance, Decreased mobility, Decreased coordination  Rehab Prognosis: Excellent  Evaluation/Treatment Tolerance: Patient tolerated treatment well  Medical Staff Made Aware: Yes  End of Session Communication: Bedside nurse, Physician  Assessment Comment: 42 yo female, typically Ind with SC.  Presenting with L PLIC CVA.  Currently limited by deficits in strength, balance and motor control requiring Mod A to ambulate.  Would benefit from continued PT while in hospital to address deficits and facilitate return to PLOF.  Would benefit from high intensity PT at time of d/c to continue progress.  End of Session Patient Position: Bed, 3 rail up, Alarm off, not on at start of session, Alarm off, caregiver present (daughter present)   IP OR SWING BED PT PLAN  Inpatient or Swing Bed: Inpatient  PT Plan  PT Plan: Skilled PT  PT Frequency: 5 times per week  PT Discharge Recommendations: High intensity level of continued care  PT Recommended Transfer Status: Assist x2  PT - OK to Discharge: Yes (Pt has been evaluated and d/c recc is in place)      Subjective     General Visit Information:  General  Reason for Referral: Impaired speech and R side weakness, found to have L PLIC CVA  Past Medical History Relevant to Rehab: Previous R CHARLIE, & L frontal CVA 5/2023, BPD, NUBIA, IDDM c/b retinopathy, HTN  Missed Visit: No  Missed Visit Reason:  (--)  Family/Caregiver Present: Yes  Caregiver Feedback: daughter present and supportive  Prior to Session Communication: Bedside nurse, Physician  Patient Position Received: Bed, 3 rail up, Alarm off, not on at start of session, Alarm off, caregiver present  Preferred Learning Style: verbal  General  Comment: Supine.  Pt very pleasant, cooperative and agreeable to PT.  Pt with noted deficits in R side strength and balance, but able to progress to short ambulation distance in room today.  Tolerated well with stable vitals.  Home Living:  Home Living  Type of Home: House ((currently))  Lives With: Adult children (daughter)  Home Adaptive Equipment: Cane  Home Layout: One level  Home Access: Stairs to enter with rails  Entrance Stairs-Number of Steps: 5  Home Living Comments: Pt reports in process of getting apartment, that is one level, on 2nd floor with ~12 stairs to enter  Prior Level of Function:  Prior Function Per Pt/Caregiver Report  Level of [place]: Independent with ADLs and functional transfers  Ambulatory Assistance: Independent (with SC)  Precautions:  Precautions  Precautions Comment: SBP <220, avoid <100    Objective   Pain:  Pain Assessment  Pain Assessment: 0-10  Pain Score: 0 - No pain  Cognition:  Cognition  Overall Cognitive Status: Within Functional Limits  Orientation Level: Oriented X4 (some word finding difficulties)  Following Commands: Follows all commands and directions without difficulty    General Assessments:    Activity Tolerance  Endurance: Tolerates 10 - 20 min exercise with multiple rests    Sensation  Light Touch: Partial deficits in the RUE, Partial deficits in the RLE    Strength  Strength Comments: LUE, LLE 5/5.  RUE shoulder 2-/5, elbow/ 3/5, RLE hip/knee 2+/5, ankle 3+/5  Strength  Strength Comments: LUE, LLE 5/5.  RUE shoulder 2-/5, elbow/ 3/5, RLE hip/knee 2+/5, ankle 3+/5    Perception  Inattention/Neglect: Appears intact      Coordination  Movements are Fluid and Coordinated: No  Upper Body Coordination: Impaired opposition and FTN, RUE.  Impaired HTS RLE    Postural Control  Postural Control: Impaired (kyphotic with forward head)    Static Sitting Balance  Static Sitting-Level of Assistance: Close supervision    Static Standing Balance  Static Standing-Level  of Assistance: Minimum assistance  Functional Assessments:     Bed Mobility  Bed Mobility: Yes  Bed Mobility 1  Bed Mobility 1: Supine to sitting, Sitting to supine  Level of Assistance 1: Minimum assistance, Minimal verbal cues, Minimal tactile cues    Transfers  Transfer: Yes  Transfer 1  Transfer From 1: Sit to, Stand to  Transfer to 1: Sit  Transfer Device 1:  (PT standing in front of pt, supporting RUE and  trunk)  Transfer Level of Assistance 1: Moderate assistance, Minimal verbal cues, Minimal tactile cues    Ambulation/Gait Training  Ambulation/Gait Training Performed: Yes  Ambulation/Gait Training 1  Surface 1: Level tile  Device 1:  (PT standing in front of pt, supporting RUE and trunk)  Assistance 1: Moderate assistance, Minimal verbal cues, Minimal tactile cues  Quality of Gait 1: Wide base of support, Inconsistent stride length, Decreased step length, Forward flexed posture, Ataxic, Knee(s) buckle (mild R hemiplegic)  Comments/Distance (ft) 1: 2 feet sidesteps L and R, seated break, 15 feet    Stairs  Stairs: No    Treatments:    Bed Mobility  Bed Mobility: Yes  Bed Mobility 1  Bed Mobility 1: Supine to sitting, Sitting to supine  Level of Assistance 1: Minimum assistance, Minimal verbal cues, Minimal tactile cues    Ambulation/Gait Training  Ambulation/Gait Training Performed: Yes  Ambulation/Gait Training 1  Surface 1: Level tile  Device 1:  (PT standing in front of pt, supporting RUE and trunk)  Assistance 1: Moderate assistance, Minimal verbal cues, Minimal tactile cues  Quality of Gait 1: Wide base of support, Inconsistent stride length, Decreased step length, Forward flexed posture, Ataxic, Knee(s) buckle (mild R hemiplegic)  Comments/Distance (ft) 1: 2 feet sidesteps L and R, seated break, 15 feet  Transfers  Transfer: Yes  Transfer 1  Transfer From 1: Sit to, Stand to  Transfer to 1: Sit  Transfer Device 1:  (PT standing in front of pt, supporting RUE and  trunk)  Transfer Level of Assistance 1:  Moderate assistance, Minimal verbal cues, Minimal tactile cues    Stairs  Stairs: No    Outcome Measures:    [facility] Basic Mobility  Turning from your back to your side while in a flat bed without using bedrails: A little  Moving from lying on your back to sitting on the side of a flat bed without using bedrails: A little  Moving to and from bed to chair (including a wheelchair): A lot  Standing up from a chair using your arms (e.g. wheelchair or bedside chair): A lot  To walk in hospital room: A lot  Climbing 3-5 steps with railing: Total  Basic Mobility - Total Score: 13    Encounter Problems       Encounter Problems (Active)       Balance       T[place] >19 to reflect improvement in balance and decrease in falls risk.       Start:  05/06/24    Expected End:  05/20/24               Mobility       Ambulate >50 feet, LRD, CGA       Start:  05/06/24    Expected End:  05/20/24            up/dn 4 stairs, Mod A       Start:  05/06/24    Expected End:  05/20/24               PT Transfers       bed mobility, SBA       Start:  05/06/24    Expected End:  05/20/24            sit<>stand, bed<>chair, LRD, CGA       Start:  05/06/24    Expected End:  05/20/24                   Education Documentation  Mobility Training, taught by Joaquín Echevarria, PT at 5/6/2024  4:34 PM.  Learner: Patient  Readiness: Eager  Method: Explanation  Response: Verbalizes Understanding    Education Comments  No comments found.

## 2024-05-06 NOTE — PROGRESS NOTES
"Tracey Stephen is a 43 y.o. female on day 1 of admission presenting with Cerebrovascular accident (CVA), unspecified mechanism (Multi).    Subjective   NAEON, pt reports continuing L weakness and speech issues (stuttering)       Objective     Last Recorded Vitals  Blood pressure (!) 136/99, pulse 99, temperature 36.4 °C (97.6 °F), temperature source Temporal, resp. rate 16, height 1.651 m (5' 5\"), weight 85.7 kg (189 lb), SpO2 100%.    Physical Exam  Neurological Exam  GENERAL APPEARANCE:  No distress, alert and cooperative.   CARDIOVASCULAR: Regular, rate and rhythm, without murmur. No carotid bruits.   MENTAL STATE:  Orientation was normal to time, place and person. Language was notable for delays in speech as well as word finding difficulty. Expressive > receptive aphasia.   CRANIAL NERVES:  Cranial nerves were normal.      CN 2- Visual fields full to confrontation.      CN 3, 4, 6-  Pupils round, 4 mm in diameter, equally reactive to light. No ptosis. EOMs normal alignment, full range of movement, no nystagmus     CN 5- Facial sensation intact bilaterally. Normal corneal reflexes.      CN 7- Mild R FD.     CN 8- Hearing intact to finger rub, whisper.      CN 9- Palate elevates symmetrically. Dysarthric with hoarseness.      CN 11- Normal strength of shoulder shrug and neck turning      CN 12- Tongue midline, with normal bulk and strength; no fasciculations.   MOTOR:  Noted to have mild RUE spasticity. Muscle strength was 5/5 in distal and proximal muscles in both LUE and LLE. Grossly 4/5 strength in RLE and RUE. No fasciculations, tremor or other abnormal movements were present.   REFLEXES:  Right/ Left:  Biceps 2/2, brachioradialis 2/2, triceps 2/2, patellar 0/0, ankle 0/0  Babinski: toes downgoing to plantar stimulation. No clonus, frontal release signs or other pathologic reflexes present.   SENSORY: Dense hemibody sensory loss on R.   COORDINATION:  FNF intact BL.     GAIT: Able to stand and appears to fall to " R while trying to walk unassisted.      Relevant Results    NIH Stroke Scale  1A. Level of Consciousness: Alert, Keenly Responsive  1B. Ask Month and Age: Both Questions Right  1C. Blink Eyes & Squeeze Hands: Performs Both Tasks  2. Best Gaze: Normal  3. Visual: No Visual Loss  4. Facial Palsy: Minor Paralysis  5A. Motor - Left Arm: No Drift  5B. Motor - Right Arm: Drift  6A. Motor - Left Leg: No Drift  6B. Motor - Right Leg: Drift  7. Limb Ataxia: Absent  8. Sensory Loss: Mild-to-Moderate Sensory Loss  9. Best Language: No Aphasia  10. Dysarthria: Mild-to-Moderate Dysarthria  11. Extinction and Inattention: No Abnormality  NIH Stroke Scale: 5           [place] Coma Scale  Best Eye Response: Spontaneous  Best Verbal Response: Oriented  Best Motor Response: Follows commands  [place] Coma Scale Score: 15                                  Assessment/Plan      Principal Problem:    Cerebrovascular accident (CVA), unspecified mechanism (Multi)    Tracey Stephen is a 43 y.o. RH female with a PMHx of BPD type II, NUBIA, insulin-dependent T2DM c/b retinopathy, HTN, prior R cingulate gyrus/corpus callosum and L frontal infarcts (5/2023) w/ residual RLE weakness, who presents to [facility] on 5/5/24 as a BAT for speech difficulty, worsening, R sided weakness.     She was already diagnosed with L PLIC and insular stroke on 5/3 however left AMA. Treated with DAPT without load. Given she is worsening and has presumed small vessel disease stroke at this time we will treat with Plavix load as we believe this could be a stuttering lacunar infarct.      In regard to long term stroke prevention this is a patient who is of poor health literacy and poor medication compliance and given lack of any clear findings in regard to a hypercoagulable workup we would opt with trying to control her poorly controlled risk factors.      Stroke Metrics:  EMS pre-notification?: Yes  Time of stroke team arrival: Within 5 minutes of page  Direct to CT?:  Yes  Time of CTH read by stroke team: As performed     Stroke Classification:  Type: Ischemic stroke  Subtype/etiology:   Vessels involved: L MCA branches  Neurological manifestations:  Pre-Intervention Deficits: NIHSS 7  Pre-stroke mRS: 2  Initial treatment: Plavix  Anti-platelets or Anti-coagulation management: DAPT  Vascular Risk Factors: DM2, Smoking (THC), HLD  Antiplatelet/antithrombotic plan for stroke prevention: DAPT 21 days then aspirin   VTE prophylaxis: Lovenox     Vascular Risk Factor modification goals:  Blood pressure goals: avoid hypotension SBP <100 that could worsen cerebral perfusion, Ischemic stroke- early permissive hypertension SBP < 220 mmHg with cautious inpatient lowering  Lipid Goals: education on healthy diet and statin therapy to maintain or achieve goal LDL-cholesterol < 70mg  Glucose Goals: early treatment of hyperglycemia to goal glucose 140-180 mg/dl with long-term goal A1c < 7%   Smoking Cessation and Education  Assessment for Rehabilitation needs   Patient and family education on signs and symptoms of stroke, calling 911, healthy strategies for stroke prevention.       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 and L frontal infarcts   - Admit to floor with tele  - S/p Plavix 300 mg in ED  - Start Plavix 75 mg daily for next 21 days  - ASA 81mg daily  - Atorva 40mg QHS   - Give labetalol/hydralazine PRN for blood pressure  - TTE pending     #HTN  - EKG nsr  - Permissive HTN; -220     #HLD  - Atrovastatin 40 mg QHS     #IDDM2  - HbA1C 10.6%  - Mild SSI   - Q4H Glucose checks while NPO  - Diabetes educator consult in AM  - pt ran out of insulin, will refill per outpt regimen     #Bipolar Depression  #NUBIA  - Can establish with psychiatry as outpatient     F: Bolus PRN  E: K > 4; Phos > 3; Mg > 2  N: NPO  C: Full Code  A: PIV  P: Lovenox     SAGRARIO (Mother Kiersten) [phone]     Kale Mario,  MD  Neurology PGY-2  Stroke team pager [phone]

## 2024-05-06 NOTE — PROGRESS NOTES
Physical Therapy                 Therapy Communication Note    Patient Name: Tracey Stephen  MRN: [id number]  Today's Date: 5/6/2024     Discipline: Physical Therapy    Missed Visit Reason: Missed Visit Reason: Other (Comment) (Messaged team to verify pt in ED with acute CVA is medically appropriate for PT eval.  Message read, but no response yet.  Awaiting response.)    Missed Time: Attempt    Comment:

## 2024-05-06 NOTE — PROGRESS NOTES
Speech-Language Pathology  Adult Inpatient Speech/Language Evaluation    Patient Name: Tracey Stephen  MRN: [id number]  Today's Date: 5/6/2024   Start Time: 1000  Stop Time: 1030  Time Calculation (min): 30    History of Present Illness:   Tracey Stephen is a 43 y.o. RH female with a PMHx of BPD type II, NUBIA, insulin-dependent T2DM c/b retinopathy, HTN, prior R cingulate gyrus/corpus callosum and L frontal infarcts (5/2023) w/ residual RLE weakness, who presents to [facility] on 5/5/24 as a BAT for speech difficulty, worsening, R sided weakness.      LKW 2200 (5/2/24). Symptoms continued to persist on arrival.      Of note patient presented as BAT on 5/2/24 and was diagnosed with L PLIC stroke. Seen by Micheal Tomas and Patrick. Plan at that time was to admit for further workup, however patient left AMA to secure housing for herself.      Patient and patient's son able to give history at bedside. She went home after leaving AMA on 5/4 early AM. On 5/5 AM patient noted to have worsening weakness on R side with difficulty walking. Decided to come to ED for further care.     Assessment:   Informal speech-language evaluation completed. Pt presents with WNL receptive language abilities. Moderate expressive aphasia noted with mild dysarthria of speech. No apraxia of speech suspected at this time. See below for full details/breakdown of deficit. Will continue to follow while in acute care setting to provide speech/language treatment targeting expressive aphasia and dysarthria. SLP provided extensive education/treatment to pt and family at bedside.     Recommend Speech Therapy Services:   Yes;     Goal(s):   Pt will complete a variety of expressive language tasks with min cues at 75% accuracy.     Plan:  SLP Services Indicated: Yes  Frequency: 2x week  Discussed POC with patient, daughter, and mother  SLP - OK to Discharge    Pain:   0-10  0 = No pain.     Inpatient Education:  Extensive education provided to patient, daughter, and  mother  regarding current speech/language/cognitive function, recommendations/results, and POC.      Consultations/Referrals/Coordination of Services:   N/A

## 2024-05-07 ENCOUNTER — APPOINTMENT (OUTPATIENT)
Dept: RADIOLOGY | Facility: HOSPITAL | Age: 44
End: 2024-05-07
Payer: COMMERCIAL

## 2024-05-07 LAB
ALBUMIN SERPL BCP-MCNC: 2.8 G/DL (ref 3.4–5)
ALBUMIN SERPL BCP-MCNC: 3 G/DL (ref 3.4–5)
ANION GAP SERPL CALC-SCNC: 15 MMOL/L (ref 10–20)
ANION GAP SERPL CALC-SCNC: 17 MMOL/L (ref 10–20)
BASOPHILS # BLD AUTO: 0.05 X10*3/UL (ref 0–0.1)
BASOPHILS NFR BLD AUTO: 0.5 %
BUN SERPL-MCNC: 36 MG/DL (ref 6–23)
BUN SERPL-MCNC: 37 MG/DL (ref 6–23)
CALCIUM SERPL-MCNC: 7.8 MG/DL (ref 8.6–10.6)
CALCIUM SERPL-MCNC: 8.5 MG/DL (ref 8.6–10.6)
CHLORIDE SERPL-SCNC: 102 MMOL/L (ref 98–107)
CHLORIDE SERPL-SCNC: 104 MMOL/L (ref 98–107)
CO2 SERPL-SCNC: 20 MMOL/L (ref 21–32)
CO2 SERPL-SCNC: 21 MMOL/L (ref 21–32)
CREAT SERPL-MCNC: 4.24 MG/DL (ref 0.5–1.05)
CREAT SERPL-MCNC: 4.5 MG/DL (ref 0.5–1.05)
EGFRCR SERPLBLD CKD-EPI 2021: 12 ML/MIN/1.73M*2
EGFRCR SERPLBLD CKD-EPI 2021: 13 ML/MIN/1.73M*2
EOSINOPHIL # BLD AUTO: 0.31 X10*3/UL (ref 0–0.7)
EOSINOPHIL NFR BLD AUTO: 3.2 %
ERYTHROCYTE [DISTWIDTH] IN BLOOD BY AUTOMATED COUNT: 12.6 % (ref 11.5–14.5)
GLUCOSE BLD MANUAL STRIP-MCNC: 168 MG/DL (ref 74–99)
GLUCOSE SERPL-MCNC: 149 MG/DL (ref 74–99)
GLUCOSE SERPL-MCNC: 177 MG/DL (ref 74–99)
HCT VFR BLD AUTO: 33 % (ref 36–46)
HGB BLD-MCNC: 11.9 G/DL (ref 12–16)
IMM GRANULOCYTES # BLD AUTO: 0.04 X10*3/UL (ref 0–0.7)
IMM GRANULOCYTES NFR BLD AUTO: 0.4 % (ref 0–0.9)
LYMPHOCYTES # BLD AUTO: 2.87 X10*3/UL (ref 1.2–4.8)
LYMPHOCYTES NFR BLD AUTO: 29.4 %
MAGNESIUM SERPL-MCNC: 1.48 MG/DL (ref 1.6–2.4)
MAGNESIUM SERPL-MCNC: 2.26 MG/DL (ref 1.6–2.4)
MCH RBC QN AUTO: 30.2 PG (ref 26–34)
MCHC RBC AUTO-ENTMCNC: 36.1 G/DL (ref 32–36)
MCV RBC AUTO: 84 FL (ref 80–100)
MONOCYTES # BLD AUTO: 0.78 X10*3/UL (ref 0.1–1)
MONOCYTES NFR BLD AUTO: 8 %
NEUTROPHILS # BLD AUTO: 5.7 X10*3/UL (ref 1.2–7.7)
NEUTROPHILS NFR BLD AUTO: 58.5 %
NRBC BLD-RTO: 0 /100 WBCS (ref 0–0)
PHOSPHATE SERPL-MCNC: 4.1 MG/DL (ref 2.5–4.9)
PHOSPHATE SERPL-MCNC: 4.4 MG/DL (ref 2.5–4.9)
PLATELET # BLD AUTO: 292 X10*3/UL (ref 150–450)
POTASSIUM SERPL-SCNC: 3.6 MMOL/L (ref 3.5–5.3)
POTASSIUM SERPL-SCNC: 4.3 MMOL/L (ref 3.5–5.3)
RBC # BLD AUTO: 3.94 X10*6/UL (ref 4–5.2)
SODIUM SERPL-SCNC: 135 MMOL/L (ref 136–145)
SODIUM SERPL-SCNC: 136 MMOL/L (ref 136–145)
WBC # BLD AUTO: 9.8 X10*3/UL (ref 4.4–11.3)

## 2024-05-07 PROCEDURE — 2500000002 HC RX 250 W HCPCS SELF ADMINISTERED DRUGS (ALT 637 FOR MEDICARE OP, ALT 636 FOR OP/ED)

## 2024-05-07 PROCEDURE — 99222 1ST HOSP IP/OBS MODERATE 55: CPT | Performed by: INTERNAL MEDICINE

## 2024-05-07 PROCEDURE — 80069 RENAL FUNCTION PANEL: CPT

## 2024-05-07 PROCEDURE — 99233 SBSQ HOSP IP/OBS HIGH 50: CPT

## 2024-05-07 PROCEDURE — 2500000001 HC RX 250 WO HCPCS SELF ADMINISTERED DRUGS (ALT 637 FOR MEDICARE OP): Performed by: STUDENT IN AN ORGANIZED HEALTH CARE EDUCATION/TRAINING PROGRAM

## 2024-05-07 PROCEDURE — 97165 OT EVAL LOW COMPLEX 30 MIN: CPT | Mod: GO

## 2024-05-07 PROCEDURE — 97530 THERAPEUTIC ACTIVITIES: CPT | Mod: GP

## 2024-05-07 PROCEDURE — 36415 COLL VENOUS BLD VENIPUNCTURE: CPT

## 2024-05-07 PROCEDURE — 85025 COMPLETE CBC W/AUTO DIFF WBC: CPT

## 2024-05-07 PROCEDURE — 2500000004 HC RX 250 GENERAL PHARMACY W/ HCPCS (ALT 636 FOR OP/ED)

## 2024-05-07 PROCEDURE — 2500000004 HC RX 250 GENERAL PHARMACY W/ HCPCS (ALT 636 FOR OP/ED): Performed by: STUDENT IN AN ORGANIZED HEALTH CARE EDUCATION/TRAINING PROGRAM

## 2024-05-07 PROCEDURE — 83735 ASSAY OF MAGNESIUM: CPT

## 2024-05-07 PROCEDURE — 76770 US EXAM ABDO BACK WALL COMP: CPT

## 2024-05-07 PROCEDURE — 97530 THERAPEUTIC ACTIVITIES: CPT | Mod: GO

## 2024-05-07 PROCEDURE — 92526 ORAL FUNCTION THERAPY: CPT | Mod: GN | Performed by: SPEECH-LANGUAGE PATHOLOGIST

## 2024-05-07 PROCEDURE — 1200000002 HC GENERAL ROOM WITH TELEMETRY DAILY

## 2024-05-07 PROCEDURE — 97112 NEUROMUSCULAR REEDUCATION: CPT | Mod: GP

## 2024-05-07 PROCEDURE — 97116 GAIT TRAINING THERAPY: CPT | Mod: GP

## 2024-05-07 PROCEDURE — 92507 TX SP LANG VOICE COMM INDIV: CPT | Mod: GN | Performed by: SPEECH-LANGUAGE PATHOLOGIST

## 2024-05-07 PROCEDURE — 82947 ASSAY GLUCOSE BLOOD QUANT: CPT

## 2024-05-07 PROCEDURE — 76770 US EXAM ABDO BACK WALL COMP: CPT | Performed by: RADIOLOGY

## 2024-05-07 RX ORDER — INSULIN GLARGINE 100 [IU]/ML
10 INJECTION, SOLUTION SUBCUTANEOUS 2 TIMES DAILY
Status: DISCONTINUED | OUTPATIENT
Start: 2024-05-07 | End: 2024-05-08 | Stop reason: HOSPADM

## 2024-05-07 RX ORDER — MAGNESIUM SULFATE HEPTAHYDRATE 40 MG/ML
2 INJECTION, SOLUTION INTRAVENOUS ONCE
Status: COMPLETED | OUTPATIENT
Start: 2024-05-07 | End: 2024-05-07

## 2024-05-07 RX ORDER — NAPROXEN SODIUM 220 MG/1
81 TABLET, FILM COATED ORAL DAILY
Qty: 18 TABLET | Refills: 0 | Status: SHIPPED | OUTPATIENT
Start: 2024-05-07 | End: 2024-05-08

## 2024-05-07 RX ORDER — INSULIN LISPRO 100 [IU]/ML
0-10 INJECTION, SOLUTION INTRAVENOUS; SUBCUTANEOUS
Status: DISCONTINUED | OUTPATIENT
Start: 2024-05-07 | End: 2024-05-08 | Stop reason: HOSPADM

## 2024-05-07 RX ORDER — CLOPIDOGREL BISULFATE 75 MG/1
75 TABLET ORAL DAILY
Qty: 30 TABLET | Refills: 11 | Status: SHIPPED | OUTPATIENT
Start: 2024-05-07 | End: 2024-05-08 | Stop reason: HOSPADM

## 2024-05-07 RX ADMIN — ASPIRIN 81 MG: 81 TABLET, COATED ORAL at 08:15

## 2024-05-07 RX ADMIN — INSULIN GLARGINE 10 UNITS: 100 INJECTION, SOLUTION SUBCUTANEOUS at 20:47

## 2024-05-07 RX ADMIN — ENOXAPARIN SODIUM 40 MG: 100 INJECTION SUBCUTANEOUS at 20:48

## 2024-05-07 RX ADMIN — CLOPIDOGREL BISULFATE 75 MG: 75 TABLET ORAL at 08:15

## 2024-05-07 RX ADMIN — SODIUM CHLORIDE 1000 ML: 9 INJECTION, SOLUTION INTRAVENOUS at 09:58

## 2024-05-07 RX ADMIN — POLYETHYLENE GLYCOL 3350 17 G: 17 POWDER, FOR SOLUTION ORAL at 08:15

## 2024-05-07 RX ADMIN — INSULIN GLARGINE 10 UNITS: 100 INJECTION, SOLUTION SUBCUTANEOUS at 08:02

## 2024-05-07 RX ADMIN — MAGNESIUM SULFATE HEPTAHYDRATE 2 G: 40 INJECTION, SOLUTION INTRAVENOUS at 08:02

## 2024-05-07 RX ADMIN — INSULIN LISPRO 2 UNITS: 100 INJECTION, SOLUTION INTRAVENOUS; SUBCUTANEOUS at 07:58

## 2024-05-07 RX ADMIN — ATORVASTATIN CALCIUM 40 MG: 40 TABLET, FILM COATED ORAL at 20:48

## 2024-05-07 SDOH — SOCIAL STABILITY: SOCIAL INSECURITY: HAS ANYONE EVER THREATENED TO HURT YOUR FAMILY OR YOUR PETS?: NO

## 2024-05-07 SDOH — SOCIAL STABILITY: SOCIAL INSECURITY: HAVE YOU HAD THOUGHTS OF HARMING ANYONE ELSE?: NO

## 2024-05-07 SDOH — SOCIAL STABILITY: SOCIAL INSECURITY: HAVE YOU HAD ANY THOUGHTS OF HARMING ANYONE ELSE?: NO

## 2024-05-07 SDOH — SOCIAL STABILITY: SOCIAL INSECURITY: ABUSE: ADULT

## 2024-05-07 SDOH — SOCIAL STABILITY: SOCIAL INSECURITY: WERE YOU ABLE TO COMPLETE ALL THE BEHAVIORAL HEALTH SCREENINGS?: YES

## 2024-05-07 SDOH — SOCIAL STABILITY: SOCIAL INSECURITY: DOES ANYONE TRY TO KEEP YOU FROM HAVING/CONTACTING OTHER FRIENDS OR DOING THINGS OUTSIDE YOUR HOME?: NO

## 2024-05-07 SDOH — SOCIAL STABILITY: SOCIAL INSECURITY: DO YOU FEEL UNSAFE GOING BACK TO THE PLACE WHERE YOU ARE LIVING?: NO

## 2024-05-07 SDOH — SOCIAL STABILITY: SOCIAL INSECURITY: ARE YOU OR HAVE YOU BEEN THREATENED OR ABUSED PHYSICALLY, EMOTIONALLY, OR SEXUALLY BY ANYONE?: NO

## 2024-05-07 SDOH — SOCIAL STABILITY: SOCIAL INSECURITY: DO YOU FEEL ANYONE HAS EXPLOITED OR TAKEN ADVANTAGE OF YOU FINANCIALLY OR OF YOUR PERSONAL PROPERTY?: NO

## 2024-05-07 SDOH — SOCIAL STABILITY: SOCIAL INSECURITY: ARE THERE ANY APPARENT SIGNS OF INJURIES/BEHAVIORS THAT COULD BE RELATED TO ABUSE/NEGLECT?: NO

## 2024-05-07 ASSESSMENT — PAIN SCALES - GENERAL
PAINLEVEL_OUTOF10: 0 - NO PAIN

## 2024-05-07 ASSESSMENT — COGNITIVE AND FUNCTIONAL STATUS - GENERAL
TOILETING: A LOT
TURNING FROM BACK TO SIDE WHILE IN FLAT BAD: A LITTLE
HELP NEEDED FOR BATHING: A LOT
EATING MEALS: A LITTLE
STANDING UP FROM CHAIR USING ARMS: A LITTLE
DRESSING REGULAR UPPER BODY CLOTHING: A LOT
CLIMB 3 TO 5 STEPS WITH RAILING: TOTAL
WALKING IN HOSPITAL ROOM: A LOT
DAILY ACTIVITIY SCORE: 14
DAILY ACTIVITIY SCORE: 24
MOBILITY SCORE: 24
MOVING TO AND FROM BED TO CHAIR: A LOT
PERSONAL GROOMING: A LITTLE
MOVING FROM LYING ON BACK TO SITTING ON SIDE OF FLAT BED WITH BEDRAILS: A LITTLE
MOBILITY SCORE: 14
DRESSING REGULAR LOWER BODY CLOTHING: A LOT

## 2024-05-07 ASSESSMENT — ACTIVITIES OF DAILY LIVING (ADL)
JUDGMENT_ADEQUATE_SAFELY_COMPLETE_DAILY_ACTIVITIES: YES
BATHING: NEEDS ASSISTANCE
WALKS IN HOME: NEEDS ASSISTANCE
HEARING - LEFT EAR: FUNCTIONAL
ADEQUATE_TO_COMPLETE_ADL: YES
TOILETING: NEEDS ASSISTANCE
DRESSING YOURSELF: NEEDS ASSISTANCE
BATHING_ASSISTANCE: MODERATE
GROOMING: NEEDS ASSISTANCE
HEARING - RIGHT EAR: FUNCTIONAL
PATIENT'S MEMORY ADEQUATE TO SAFELY COMPLETE DAILY ACTIVITIES?: YES
FEEDING YOURSELF: NEEDS ASSISTANCE

## 2024-05-07 ASSESSMENT — PAIN - FUNCTIONAL ASSESSMENT
PAIN_FUNCTIONAL_ASSESSMENT: 0-10

## 2024-05-07 ASSESSMENT — PATIENT HEALTH QUESTIONNAIRE - PHQ9
1. LITTLE INTEREST OR PLEASURE IN DOING THINGS: NOT AT ALL
2. FEELING DOWN, DEPRESSED OR HOPELESS: NOT AT ALL
SUM OF ALL RESPONSES TO PHQ9 QUESTIONS 1 & 2: 0

## 2024-05-07 NOTE — PROGRESS NOTES
Occupational Therapy    Evaluation/Treatment    Patient Name: Tracey Stephen  MRN: [id number]  : 1980  Today's Date: 24  Start Time: 1029  End Time 1052  Total Time: 23 min    Assessment:  OT Assessment: Pt has RUE weakness and impaired ROM. Pt has decreased sensation in RUE, she can feel the touch but cannot locate it/ Pt completed bed mobility with Min A. Pt completed sit-to-stand transfer, took 4-5 side steps, and stand-to-sit with Min A x2 but impaired balance towards her right side,  Prognosis: Good  Medical Staff Made Aware: Yes  End of Session Communication: Bedside nurse  End of Session Patient Position: Bed, 3 rail up, Alarm off, not on at start of session, Alarm off, caregiver present (Family members present)  OT Assessment Results: Decreased ADL status, Decreased upper extremity range of motion, Decreased upper extremity strength, Decreased endurance, Decreased sensation, Decreased fine motor control, Decreased functional mobility, Decreased gross motor control, Decreased IADLs  Prognosis: Good  Medical Staff Made Aware: Yes  Plan:  Treatment Interventions: Functional transfer training, UE strengthening/ROM, Endurance training, Fine motor coordination activities, Compensatory technique education  OT Frequency: 3 times per week  OT Discharge Recommendations: High intensity level of continued care  OT Recommended Transfer Status: Minimal assist  OT - OK to Discharge: Yes (When medically ready)  Treatment Interventions: Functional transfer training, UE strengthening/ROM, Endurance training, Fine motor coordination activities, Compensatory technique education    Subjective   Current Problem:  1. Cerebrovascular accident (CVA), unspecified mechanism (Multi)  Transthoracic Echo (TTE) Complete    Transthoracic Echo (TTE) Complete      2. Other cerebral infarction (Multi)  Transthoracic Echo (TTE) Complete        General:   OT Received On: 24  General  Reason for Referral: Impaired speech and R  side weakness, found to have L PLIC CVA  Past Medical History Relevant to Rehab: Previous R CHARLIE, & L frontal CVA 5/2023, BPD, NUBIA, IDDM c/b retinopathy, HTN  Family/Caregiver Present: Yes  Caregiver Feedback:  (Family present and supportive)  Prior to Session Communication: Bedside nurse  Patient Position Received: Bed, 3 rail up, Alarm off, not on at start of session, Alarm off, caregiver present  General Comment: Pt pleasant and agreeable to session  Precautions:  Medical Precautions:  (Aspiration precautions)  Vital Signs:  BP: 139/88 (139/88 while lying down. 137/84 after sitting up)  BP Location: Right arm  Pain:  Pain Assessment  Pain Assessment: 0-10  Pain Score: 0 - No pain    Objective   Cognition:  Overall Cognitive Status: Within Functional Limits  Orientation Level: Oriented X4     Home Living:  Type of Home: House (Pt family states when pt goes home it will be an apartment w/ stairs)  Lives With: Adult children (Daughter)  Home Adaptive Equipment: Cane  Home Layout: One level  Prior Function:  Level of [place]: Independent with ADLs and functional transfers  Receives Help From: Family  Hand Dominance: Right    ADL:  Eating Assistance: Minimal (Anticipate)  Eating Deficit: Setup, Increased time to complete (Pt has weakness in RUE and is right handed.)  Grooming Assistance: Minimal (Anticipate)  Grooming Deficit: Setup, Increased time to complete (Pt has weakness in RUE and is right handed.)  Bathing Assistance: Moderate (Anticipate)  Bathing Deficit: Setup, Steadying, Supervision/safety, Increased time to complete  (Pt has weakness in RUE and is right handed. Pts standing balance is impaired)  UE Dressing Assistance: Minimal (Anticipate)  UE Dressing Deficit: Setup, Increased time to complete (Pt has weakness in RUE and is right handed.)  LE Dressing Assistance: Moderate (Anticipate)  LE Dressing Deficit: Setup, Steadying, Supervision/safety (Pt has weakness and decreased AROM in RUE and is right  handed.)  Toileting Assistance with Device: Moderate (Anticipate)  Toileting Deficit: Setup, Steadying, Supervision/safety, Increased time to complete  Functional Assistance: Moderate  ADL Comments: Pt limited by RUE weakness, decreased AROM in RUE, and impaired standing balance  Activity Tolerance:  Endurance: Tolerates 10 - 20 min exercise with multiple rests  Functional Standing Tolerance:     Bed Mobility/Transfers: Bed Mobility  Bed Mobility: Yes  Bed Mobility 1  Bed Mobility 1: Supine to sitting  Level of Assistance 1: Minimum assistance  Bed Mobility 2  Bed Mobility  2: Sitting to supine  Level of Assistance 2: Minimum assistance    Transfers  Transfer: Yes  Transfer 1  Transfer From 1: Sit to  Transfer to 1: Stand  Technique 1: Sit to stand  Transfer Level of Assistance 1: Minimum assistance (x2)  Trials/Comments 1: Bed was elevated  Transfers 2  Transfer From 2: Stand to  Transfer to 2: Sit  Technique 2: Stand to sit  Transfer Level of Assistance 2: Minimum assistance (x2)  Functional Mobility:  Functional Mobility  Functional Mobility Performed: Yes  Functional Mobility 1  Device 1: No device  Assistance 1: Minimum assistance (x2)  Comments 1: Pt took 4-5 side steps toward the HOB w/ MOD assist x2  Sitting Balance:  Static Sitting Balance  Static Sitting-Balance Support: Left upper extremity supported (RUE was propped up with pillows)  Static Sitting-Level of Assistance: Contact guard  Dynamic Sitting Balance  Dynamic Sitting-Balance Support: Left upper extremity supported (RUE was propped up with pillows)  Dynamic Sitting-Comments: CGA  Standing Balance:  Static Standing Balance  Static Standing-Balance Support: Bilateral upper extremity supported  Static Standing-Level of Assistance: Minimum assistance (x2)  Dynamic Standing Balance  Dynamic Standing-Balance Support: Bilateral upper extremity supported (x2)  Dynamic Standing-Comments: Pt took 4-5 side steps towards the HOB w/ min A x2. RLE was weak  causing imapired balance  Modalities:  Modalities Used: No  Therapy/Activity:   Balance/Neuromuscular Re-Education  Balance/Neuromuscular Re-Education Activity Performed: Yes  Balance/Neuromuscular Re-Education Activity 1: Pt completed balance training at the standing at the side of the bed. Pt took side steps and experience weakness on her right side, which caused the impaired balance  Vision:    Vision - Complex Assessment  Tracking: WFL  Visual Fields: WFL  Sensation:  Light Touch: Partial deficits in the RUE (Pt knew that arm was being touched, but couldn't locate where the light touch was located.)  Proprioception: No apparent deficits (Pt was able to tell were her arm was located in space and could match with LUE)  Strength:  Strength Comments: RUE 2+ for both shoulder and elbow.  Coordination:  Movements are Fluid and Coordinated: No   Hand Function:  Hand Function  Gross Grasp: Impaired (Pts R hand grasp was weak. When asked to make a fist, pt did not close fingers all the way)  Extremities:   RUE   RUE : Exceptions to WFL  RUE AROM (degrees)  R Shoulder Flexion  0-170: 90 Degrees (100 AAROM)  R Shoulder Internal Rotation  0-70:  (AAROM 65)  R Elbow Flexion/Extension 0-135-150: 120 °  R Wrist Flexion 0-80: 60 Degrees and LUE   LUE: Within Functional Limits  Outcome Measures: [facility] Daily Activity  Putting on and taking off regular lower body clothing: A lot  Bathing (including washing, rinsing, drying): A lot  Putting on and taking off regular upper body clothing: A lot  Toileting, which includes using toilet, bedpan or urinal: A lot  Taking care of personal grooming such as brushing teeth: A little  Eating Meals: A little  Daily Activity - Total Score: 14     and OT Adult Other Outcome Measures  4AT: negative    Education Documentation  ADL Training, taught by GABRIEL Rodriguez at 5/7/2024 12:46 PM.  Learner: Family, Patient  Readiness: Acceptance  Method: Explanation  Response: Verbalizes Understanding,  Needs Reinforcement    Education Comments  No comments found.        OP EDUCATION:       Goals:  Encounter Problems       Encounter Problems (Active)       ADLs       Patient will perform UB and LB bathing with stand by level of assistance and grab bars and shower chair.       Start:  05/07/24            Patient with complete lower body dressing with stand by assist level of assistance donning and doffing all LE clothes  with PRN adaptive equipment while edge of bed        Start:  05/07/24            Patient will complete daily grooming tasks brushing teeth with stand by assist level of assistance and PRN adaptive equipment while standing.       Start:  05/07/24               BALANCE       Pt will maintain dynamic standing balance during ADL task with stand by level of assistance in order to demonstrate decreased risk of falling and improved postural control.       Start:  05/07/24    Expected End:  05/28/24            Patientt will maintain static standing balance during ADL task with supervision level of assistance drop down in order to demonstrate decreased risk of falling and improved postural control.       Start:  05/07/24    Expected End:  05/28/24               EXERCISE/STRENGTHENING       Patient will complete RUE exercises for  10 reps in order to improve strength and activity for ADL performance.        Start:  05/07/24    Expected End:  05/28/24               MOBILITY       Patient will perform Functional mobility min Household distances/Community Distances with stand by assist level of assistance and front wheeled walker in order to improve safety and functional mobility.       Start:  05/07/24    Expected End:  05/28/24               TRANSFERS       Patient will complete functional transfer to chair with front wheeled walker with stand by level of assistance.       Start:  05/07/24    Expected End:  05/28/24            Patient will complete sit to stand transfer with supervision level of assistance and  front wheeled walker in order to improve safety and prepare for out of bed mobility.       Start:  05/07/24    Expected End:  05/28/24                 Aracelis KLEIN/OT

## 2024-05-07 NOTE — PROGRESS NOTES
Tracey Stephen is a 43 y.o. female on day 2 of admission presenting with Cerebrovascular accident (CVA), unspecified mechanism (Multi).    [facility] at  [facility], facility started pre-cert. Sw will continue to follow.    Layne Parry MSW LSW

## 2024-05-07 NOTE — PROGRESS NOTES
Tracey Stephen is a 43 y.o. female on day 2 of admission presenting with Cerebrovascular accident (CVA), unspecified mechanism (Multi).    Charanjit met with pt bedside. Pt was rec high intensity. Sw provided pt with a choice list. Pt reports she would like the referral to be sent to [facility] on [address]. Charanjit sent referral with clinical to [facility]. Charanjit will continue to follow up.    Layne Parry MSW LSW

## 2024-05-07 NOTE — PROGRESS NOTES
"Tracey Stephen is a 43 y.o. female on day 2 of admission presenting with Cerebrovascular accident (CVA), unspecified mechanism (Multi).    Subjective   NAEON, pt reports continuing L weakness and speech issues (stuttering)       Objective     Last Recorded Vitals  Blood pressure 129/86, pulse 91, temperature 36.3 °C (97.3 °F), temperature source Temporal, resp. rate 16, height 1.651 m (5' 5\"), weight 85.7 kg (189 lb), SpO2 99%.    Physical Exam  Neurological Exam  GENERAL APPEARANCE:  No distress, alert and cooperative.   CARDIOVASCULAR: Regular, rate and rhythm, without murmur. No carotid bruits.   MENTAL STATE:  Orientation was normal to time, place and person. Language was notable for delays in speech as well as word finding difficulty. Expressive > receptive aphasia.   CRANIAL NERVES:  Cranial nerves were normal.      CN 2- Visual fields full to confrontation.      CN 3, 4, 6-  Pupils round, 4 mm in diameter, equally reactive to light. No ptosis. EOMs normal alignment, full range of movement, no nystagmus     CN 5- Facial sensation intact bilaterally. Normal corneal reflexes.      CN 7- Mild R FD.     CN 8- Hearing intact to finger rub, whisper.      CN 9- Palate elevates symmetrically. Dysarthric with hoarseness.      CN 11- Normal strength of shoulder shrug and neck turning      CN 12- Tongue midline, with normal bulk and strength; no fasciculations.   MOTOR:  Noted to have mild RUE spasticity. Muscle strength was 5/5 in distal and proximal muscles in both LUE and LLE. Grossly 4/5 strength in RLE and RUE. No fasciculations, tremor or other abnormal movements were present.   REFLEXES:  Right/ Left:  Biceps 2/2, brachioradialis 2/2, triceps 2/2, patellar 0/0, ankle 0/0  Babinski: toes downgoing to plantar stimulation. No clonus, frontal release signs or other pathologic reflexes present.   SENSORY: Dense hemibody sensory loss on R.   COORDINATION:  FNF intact BL.     GAIT: Able to stand and appears to fall to R " while trying to walk unassisted.      Relevant Results    NIH Stroke Scale  1A. Level of Consciousness: Alert, Keenly Responsive  1B. Ask Month and Age: Both Questions Right  1C. Blink Eyes & Squeeze Hands: Performs Both Tasks  2. Best Gaze: Normal  3. Visual: No Visual Loss  4. Facial Palsy: Minor Paralysis  5A. Motor - Left Arm: No Drift  5B. Motor - Right Arm: Drift  6A. Motor - Left Leg: No Drift  6B. Motor - Right Leg: Drift  7. Limb Ataxia: Absent  8. Sensory Loss: Mild-to-Moderate Sensory Loss  9. Best Language: No Aphasia  10. Dysarthria: Mild-to-Moderate Dysarthria  11. Extinction and Inattention: No Abnormality  NIH Stroke Scale: 5           [place] Coma Scale  Best Eye Response: Spontaneous  Best Verbal Response: Oriented  Best Motor Response: Follows commands  [place] Coma Scale Score: 15                                  Assessment/Plan      Principal Problem:    Cerebrovascular accident (CVA), unspecified mechanism (Multi)    Tracey Stephen is a 43 y.o. RH female with a PMHx of BPD type II, NUBIA, insulin-dependent T2DM c/b retinopathy, HTN, prior R cingulate gyrus/corpus callosum and L frontal infarcts (5/2023) w/ residual RLE weakness, who [facility] as a BAT for speech difficulty, worsening, R sided weakness.     She was already diagnosed with L PLIC and insular stroke on 5/3 however left AMA. Treated with DAPT without load. Given she is worsening and has presumed small vessel disease stroke at this time we will treat with Plavix load as we believe this could be a stuttering lacunar infarct.      In regard to long term stroke prevention this is a patient who is of poor health literacy and poor medication compliance and given lack of any clear findings in regard to a hypercoagulable workup we would opt with trying to control her poorly controlled risk factors.      Stroke Metrics:  EMS pre-notification?: Yes  Time of stroke team arrival: Within 5 minutes of page  Direct to CT?: Yes  Time  of [facility] read by stroke team: As performed     Stroke Classification:  Type: Ischemic stroke  Subtype/etiology:   Vessels involved: L MCA branches  Neurological manifestations:  Pre-Intervention Deficits: NIHSS 7  Pre-stroke mRS: 2  Initial treatment: Plavix  Anti-platelets or Anti-coagulation management: DAPT  Vascular Risk Factors: DM2, Smoking (THC), HLD  Antiplatelet/antithrombotic plan for stroke prevention: DAPT 21 days then aspirin   VTE prophylaxis: Lovenox     Vascular Risk Factor modification goals:  Blood pressure goals: avoid hypotension SBP <100 that could worsen cerebral perfusion, Ischemic stroke- early permissive hypertension SBP < 220 mmHg with cautious inpatient lowering  Lipid Goals: education on healthy diet and statin therapy to maintain or achieve goal LDL-cholesterol < 70mg  Glucose Goals: early treatment of hyperglycemia to goal glucose 140-180 mg/dl with long-term goal A1c < 7%   Smoking Cessation and Education  Assessment for Rehabilitation needs   Patient and family education on signs and symptoms of stroke, calling 911, healthy strategies for stroke prevention.       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 and L frontal infarcts   - Admit to floor with tele  - S/p Plavix 300 mg in ED  - Start Plavix 75 mg daily for next 21 days  - ASA 81mg daily  - Atorva 40mg QHS   - Give labetalol/hydralazine PRN for blood pressure  - TTE EF 55-60%, no PFO, LA nl size  - , A1c 10.6     #EMRE  #HypoMg  - Cr 1.59 > 4.24  - Mg 1.48  - give NS 1000 IV, 2g Mg IV (renally adjusted)  - repeat RFP, Mg    #DM  - start basal glargine 10u BID (home dose 20u BID)  - ISS #2  - refill home meds (pt ran out)    #HTN  - EKG nsr  - Permissive HTN; -220     #HLD  - Atrovastatin 40 mg QHS     #IDDM2  - HbA1C 10.6%  - Mild SSI   - Q4H Glucose checks while NPO  - Diabetes educator consult in AM  - pt ran out of insulin, will refill per  outpt regimen     #Bipolar Depression  #NUBIA  - Can establish with psychiatry as outpatient     F: Bolus PRN  E: K > 4; Phos > 3; Mg > 2  N: diabetic  C: Full Code  A: PIV  P: Ez ZABALA (Mother Kiersten) [phone]     Kale Mario MD  Neurology PGY-2  Stroke team pager [phone]

## 2024-05-07 NOTE — CONSULTS
"NEPHROLOGY NEW CONSULT NOTE   Tracey Stephen   43 y.o.   MRN/Room: [id number]/[id number]/[id number]    Reason for consult: EMRE    HPI:  Tracey Stephen is a 43 y.o. female with a past medical hx of IDT2DM (more recent A1c 10.6%), HTN, stroke in 2023, NUBIA, and bipolar II who presents with L PLIC insular stroke diagnosed 5/3 with speech and motor deficits. Nephrology consulted for EMRE.    Baseline creatinine is 1.1-1.3  Creatinine at the time of admission was 1.26 on 5/3, 1.29 on 5/5, and is now 4.5 on 5/7.   Blood pressure remained stably elevated (permissive HTN following stroke).  Urine output was uncharted last 24hrs  Recent contrast studies on 5/5: CTH angio  Recent ACE/ARB/diuretic use: takes hydrochlorothiazide outpatient, reports missing meds about 3x per week    No fever, rash, nausea, vomiting, loose stools, evidence of acute blood loss, joint pains, URI/viral infection. Denies changes in UOP, frequency/foamy urine, dysuria, Incomplete bladder emptying, dark/bloody urine, new back pain. Denies NSAID use other than ASA 81 mg. No known hypotensive episodes. No supplement/PPI use. Has had prior hypercoagulability workup that was negative.      In The ER: /86   Pulse 86   Temp 36.3 °C (97.3 °F) (Temporal)   Resp 18   Ht 1.651 m (5' 5\")   Wt 85.7 kg (189 lb)   SpO2 99%   BMI 31.45 kg/m²      Past Medical History:   Diagnosis Date    Acute candidiasis of vulva and vagina 06/08/2015    Vaginitis due to Candida albicans    Acute vaginitis 02/06/2015    Bacterial vaginosis    Anxiety disorder, unspecified     Anxiety    Bipolar disorder, unspecified (Multi)     Bipolar disorder    Dysmenorrhea, unspecified 01/04/2016    Menstrual cramp    Encounter for follow-up examination after completed treatment for conditions other than malignant neoplasm 01/31/2018    Postoperative examination    Encounter for other general counseling and advice on contraception 05/09/2017    Birth control counseling    Encounter for screening " for malignant neoplasm of cervix 03/21/2017    Cervical cancer screening    Mastitis without abscess 02/26/2018    Cellulitis of left breast    Obesity, unspecified 02/26/2018    Class 1 obesity    Personal history of other diseases of the female genital tract 07/25/2017    History of abnormal uterine bleeding    Personal history of other endocrine, nutritional and metabolic disease 12/08/2015    History of type 2 diabetes mellitus    Personal history of other infectious and parasitic diseases 05/09/2017    History of trichomoniasis    Personal history of other specified conditions 01/31/2018    History of urinary incontinence    Personal history of other specified conditions 07/25/2017    History of urinary incontinence    Post-traumatic stress disorder, unspecified     PTSD (post-traumatic stress disorder)    Type 2 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 (Multi) 08/07/2018    DM (diabetes mellitus), type 2 with neurological complications    Unspecified urinary incontinence     Bladder leak      Past Surgical History:   Procedure Laterality Date    BREAST SURGERY  09/02/2015    Breast Surgery Reduction Procedure    MR HEAD ANGIO WO IV CONTRAST  6/15/2023    MR HEAD ANGIO WO IV CONTRAST 6/15/2023 [facility]    MR NECK ANGIO WO IV CONTRAST  6/15/2023    MR NECK ANGIO WO IV CONTRAST 6/15/2023 [facility]    OTHER SURGICAL HISTORY  01/31/2018    Laparoscopy With Total Hysterectomy For Uterus > 250g    OTHER SURGICAL HISTORY  01/31/2018    Oviductal Surgery Bilateral Salpingectomy    TUBAL LIGATION  09/02/2015    Tubal Ligation      No family history on file.  Social History     Socioeconomic History    Marital status: Single     Spouse name: Not on file    Number of children: Not on file    Years of education: Not on file    Highest education level: Not on file   Occupational History    Not on file   Tobacco Use    Smoking status: Every Day     Types: Cigarettes    Smokeless tobacco: Never   Substance  and Sexual Activity    Alcohol use: Yes     Comment: Socil    Drug use: Yes     Types: Marijuana    Sexual activity: Not on file   Other Topics Concern    Not on file   Social History Narrative    Not on file     Social Determinants of Health     Financial Resource Strain: Low Risk  (5/15/2023)    Received from [facility]    Overall Financial Resource Strain (CARDIA)     Difficulty of Paying Living Expenses: Not hard at all   Food Insecurity: No Food Insecurity (5/15/2023)    Received from [facility]    Hunger Vital Sign     Worried About Running Out of Food in the Last Year: Never true     Ran Out of Food in the Last Year: Never true   Transportation Needs: No Transportation Needs (5/15/2023)    Received from [facility]    PRAPARE - Transportation     Lack of Transportation (Medical): No     Lack of Transportation (Non-Medical): No   Physical Activity: Not on File (8/24/2019)    Received from [facility]     Physical Activity     Physical Activity: 0   Stress: Not on File (8/24/2019)    Received from [facility]     Stress     Stress: 0   Social Connections: Not on File (8/24/2019)    Received from [facility]     Social Connections     Social Connections and Isolation: 0   Intimate Partner Violence: Not on file   Housing Stability: Low Risk  (5/15/2023)    Received from [facility]    Housing Stability Vital Sign     Unable to Pay for Housing in the Last Year: No     Number of Places Lived in the Last Year: 2     Unstable Housing in the Last Year: No       Allergies   Allergen Reactions    Hay Fever And Allergy Relief Itching    Pollen Extracts Unknown        (Not in a hospital admission)       Meds:   aspirin, 81 mg, Daily  atorvastatin, 40 mg, Nightly  clopidogrel, 75 mg, Daily  enoxaparin, 40 mg, q24h  insulin glargine, 10 Units, BID  insulin lispro, 0-10 Units, TID with meals  perflutren protein A microsphere, 0.5 mL, Once in imaging  polyethylene glycol, 17 g, Daily  sulfur hexafluoride microsphr, 2 mL,  Once in imaging         dextrose, 12.5 g, q15 min PRN  dextrose, 25 g, q15 min PRN  glucagon, 1 mg, q15 min PRN  glucagon, 1 mg, q15 min PRN  hydrALAZINE, 10 mg, q20 min PRN   Followed by  hydrALAZINE, 25 mg, q6h PRN  labetaloL, 10 mg, q10 min PRN  ondansetron, 4 mg, q8h PRN   Or  ondansetron, 4 mg, q8h PRN  oxygen, , Continuous PRN - O2/gases        Vitals:    05/07/24 1215   BP: 148/86   Pulse: 86   Resp: 18   Temp:    SpO2:         No intake/output data recorded.   Weight change:      General appearance: AAOx3. No distress  Eyes: non-icteric  Skin: no apparent rash  Heart: Rhythm and rate regular. no rub  Lungs: CTA bilat.  no wheezing/crackles  Abdomen: soft, nt/nd  Extremities: no edema bilat  : no [place]  Neuro: Paresis of RUE, expressive aphasia though able to give short answers   ACCESS: PIV    Blood Labs:  Results for orders placed or performed during the hospital encounter of 05/05/24 (from the past 24 hour(s))   Transthoracic Echo (TTE) Complete   Result Value Ref Range    AV pk mae 1.17 m/s    LVOT diam 2.15 cm    LV Biplane EF 57 %    MV avg E/e' ratio 7.89     MV E/A ratio 0.66     LA vol index A/L 22.2 ml/m2    Tricuspid annular plane systolic excursion 1.9 cm    RV free wall pk S' 12.11 cm/s    LVIDd 4.15 cm    Aortic Valve Area by Continuity of Peak Velocity 3.01 cm2    AV pk grad 5.4 mmHg    LV A4C EF 55.7    Magnesium   Result Value Ref Range    Magnesium 1.48 (L) 1.60 - 2.40 mg/dL   Renal Function Panel   Result Value Ref Range    Glucose 177 (H) 74 - 99 mg/dL    Sodium 135 (L) 136 - 145 mmol/L    Potassium 3.6 3.5 - 5.3 mmol/L    Chloride 104 98 - 107 mmol/L    Bicarbonate 20 (L) 21 - 32 mmol/L    Anion Gap 15 10 - 20 mmol/L    Urea Nitrogen 36 (H) 6 - 23 mg/dL    Creatinine 4.24 (H) 0.50 - 1.05 mg/dL    eGFR 13 (L) >60 mL/min/1.73m*2    Calcium 7.8 (L) 8.6 - 10.6 mg/dL    Phosphorus 4.1 2.5 - 4.9 mg/dL    Albumin 2.8 (L) 3.4 - 5.0 g/dL   CBC and Auto Differential   Result Value Ref Range     WBC 9.8 4.4 - 11.3 x10*3/uL    nRBC 0.0 0.0 - 0.0 /100 WBCs    RBC 3.94 (L) 4.00 - 5.20 x10*6/uL    Hemoglobin 11.9 (L) 12.0 - 16.0 g/dL    Hematocrit 33.0 (L) 36.0 - 46.0 %    MCV 84 80 - 100 fL    MCH 30.2 26.0 - 34.0 pg    MCHC 36.1 (H) 32.0 - 36.0 g/dL    RDW 12.6 11.5 - 14.5 %    Platelets 292 150 - 450 x10*3/uL    Neutrophils % 58.5 40.0 - 80.0 %    Immature Granulocytes %, Automated 0.4 0.0 - 0.9 %    Lymphocytes % 29.4 13.0 - 44.0 %    Monocytes % 8.0 2.0 - 10.0 %    Eosinophils % 3.2 0.0 - 6.0 %    Basophils % 0.5 0.0 - 2.0 %    Neutrophils Absolute 5.70 1.20 - 7.70 x10*3/uL    Immature Granulocytes Absolute, Automated 0.04 0.00 - 0.70 x10*3/uL    Lymphocytes Absolute 2.87 1.20 - 4.80 x10*3/uL    Monocytes Absolute 0.78 0.10 - 1.00 x10*3/uL    Eosinophils Absolute 0.31 0.00 - 0.70 x10*3/uL    Basophils Absolute 0.05 0.00 - 0.10 x10*3/uL   Renal function panel   Result Value Ref Range    Glucose 149 (H) 74 - 99 mg/dL    Sodium 136 136 - 145 mmol/L    Potassium 4.3 3.5 - 5.3 mmol/L    Chloride 102 98 - 107 mmol/L    Bicarbonate 21 21 - 32 mmol/L    Anion Gap 17 10 - 20 mmol/L    Urea Nitrogen 37 (H) 6 - 23 mg/dL    Creatinine 4.50 (H) 0.50 - 1.05 mg/dL    eGFR 12 (L) >60 mL/min/1.73m*2    Calcium 8.5 (L) 8.6 - 10.6 mg/dL    Phosphorus 4.4 2.5 - 4.9 mg/dL    Albumin 3.0 (L) 3.4 - 5.0 g/dL   Magnesium   Result Value Ref Range    Magnesium 2.26 1.60 - 2.40 mg/dL         ASSESSMENT:  Tracey Stephen is a 43 y.o. female with a past medical hx of IDT2DM (more recent A1c 10.6%), HTN, stroke in 2023, NUBIA, and bipolar II who presents with L PLIC insular stroke diagnosed 5/3 with speech and motor deficits. Nephrology consulted for EMRE.    #EMRE on CKD? 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   - UA on 5/3 showed yeast, 2+ blood, 3+ glucose, 3+ protein, 75 leuk esterase. Patient denying any dysuria/urinary symptoms  - Clinical volume status: euvolemic  - Electrolytes (Na, K, Ca, Phos) stable  - Acid base status: no acidosis/alkalosis    Recommendations:  - Follow up urine electrolytes, UA  - Recommend renal ultrasound, bladder scan to evaluate for retention  - Please maintain higher BP goal at 160s to allow for renal perfusion due to chronically uncontrolled hypertensive patient   - Indication for dialysis:  No   - Avoid nephrotoxins, contrast if possible  - strict Is/Os, recommend external catheter  - Renal dosing for medications for latest eGFR, follow medication trough as appropriate  - Avoid hypotension/rapid fluctuations in BPs    Giovana Lopes MD  Nephrology Resident  24 hour Renal Pager - [phone]    Discussed with attending nephrologist

## 2024-05-07 NOTE — PROGRESS NOTES
Physical Therapy    Physical Therapy Treatment    Patient Name: Tracey Stephen  MRN: [id number]  Today's Date: 5/7/2024  Time Calculation  Start Time: 0915  Stop Time: 0954  Time Calculation (min): 39 min     Assessment/Plan   PT Assessment  PT Assessment Results: Decreased strength, Impaired balance, Decreased mobility, Decreased coordination  Rehab Prognosis: Excellent  Evaluation/Treatment Tolerance: Patient tolerated treatment well  Medical Staff Made Aware: Yes  End of Session Communication: Bedside nurse  Assessment Comment: Pt motivated with excellent effort.  Able to progress ambulation distance today.  Remains appropriate for high intensity PT at time of d/c.  End of Session Patient Position: Bed, 3 rail up, Alarm off, not on at start of session, Alarm off, caregiver present     PT Plan  PT Plan: Skilled PT  PT Frequency: 5 times per week  PT Discharge Recommendations: High intensity level of continued care  PT Recommended Transfer Status: Assist x2  PT - OK to Discharge: Yes (Pt has been evaluated and d/c recc is in place)    General Visit Information:   PT  Visit  PT Received On: 05/07/24  Response to Previous Treatment: Patient with no complaints from previous session.  General  Reason for Referral: Impaired speech and R side weakness, found to have L PLIC CVA  Past Medical History Relevant to Rehab: Previous R CHARLIE, & L frontal CVA 5/2023, BPD, NUBIA, IDDM c/b retinopathy, HTN  Missed Visit: No  Missed Visit Reason:  (--)  Family/Caregiver Present: Yes  Caregiver Feedback: mother and daughter present and supportive  Prior to Session Communication: Bedside nurse, Physician  Patient Position Received: Bed, 3 rail up, Alarm off, not on at start of session, Alarm off, caregiver present  Preferred Learning Style: verbal  General Comment: Supine.  Pt very pleasant, cooperative and agreeable to PT.  Able to increase ambulation distance today.  Tolerated well.    Subjective   Precautions:  Precautions  Precautions  Comment: SBP <220, avoid <100    Objective   Pain:  Pain Assessment  Pain Assessment: 0-10  Pain Score: 0 - No pain  Cognition:  Cognition  Overall Cognitive Status: Within Functional Limits  Orientation Level: Oriented X4  Following Commands: Follows all commands and directions without difficulty  Postural Control:  Postural Control  Postural Control: Impaired (kyphotic with forward head)  Static Sitting Balance  Static Sitting-Level of Assistance: Close supervision  Static Standing Balance  Static Standing-Level of Assistance: Minimum assistance    Activity Tolerance:  Activity Tolerance  Endurance: Tolerates 30 min exercise with multiple rests  Treatments:  Therapeutic Exercise  Therapeutic Exercise Performed: Yes  Therapeutic Exercise Activity 1: Supine: AP, HS x10.  Sitting    Balance/Neuromuscular Re-Education  Balance/Neuromuscular Re-Education Activity 1: Sitting EOB 15 minutes.  Pt initially CGA with LUE support on bed.  Able to progress to SBA with 0 UE support for static sitting. Standing with Min A.  Able to stand with CGA for static standing >1 minute.  Variable Min/Mod A for dynamic standing.  Balance/Neuromuscular Re-Education Activity 2: EOB: RUE   open/close with emphasis on full digit extension.  opposition x2 (to best of pt's ability), hand over hand assist with drinking juice and eating chakraborty    Bed Mobility  Bed Mobility: Yes  Bed Mobility 1  Bed Mobility 1: Supine to sitting, Sitting to supine  Level of Assistance 1: Minimum assistance, Minimal verbal cues, Minimal tactile cues    Ambulation/Gait Training  Ambulation/Gait Training Performed: Yes  Ambulation/Gait Training 1  Surface 1: Level tile  Device 1:  (PT standing in front of pt, supporting RUE and trunk)  Gait Support Devices: Gait belt (RUE manually supported)  Assistance 1: Moderate assistance, Minimal verbal cues, Minimal tactile cues  Quality of Gait 1: Wide base of support, Inconsistent stride length, Decreased step length,  Forward flexed posture, Ataxic, Knee(s) buckle  Comments/Distance (ft) 1: 2x 20 feet, seated break, 2x 35 feet  Transfers  Transfer: Yes  Transfer 1  Transfer From 1: Sit to, Stand to  Transfer to 1: Sit  Transfer Device 1: Gait belt (PT supporting RUE)  Transfer Level of Assistance 1: Minimum assistance, Moderate verbal cues, Moderate tactile cues    Stairs  Stairs: No    Outcome Measures:    [facility] Basic Mobility  Turning from your back to your side while in a flat bed without using bedrails: A little  Moving from lying on your back to sitting on the side of a flat bed without using bedrails: A little  Moving to and from bed to chair (including a wheelchair): A lot  Standing up from a chair using your arms (e.g. wheelchair or bedside chair): A little  To walk in hospital room: A lot  Climbing 3-5 steps with railing: Total  Basic Mobility - Total Score: 14    Education Documentation  Mobility Training, taught by Joaquín Echevarria, PT at 5/7/2024  1:18 PM.  Learner: Patient  Readiness: Eager  Method: Explanation  Response: Verbalizes Understanding    Education Comments  No comments found.        OP EDUCATION:       Encounter Problems       Encounter Problems (Active)       Balance       Tinetti >19 to reflect improvement in balance and decrease in falls risk. (Progressing)       Start:  05/06/24    Expected End:  05/20/24               Mobility       Ambulate >50 feet, LRD, CGA (Progressing)       Start:  05/06/24    Expected End:  05/20/24            up/dn 4 stairs, Mod A       Start:  05/06/24    Expected End:  05/20/24               PT Transfers       bed mobility, SBA (Progressing)       Start:  05/06/24    Expected End:  05/20/24            sit<>stand, bed<>chair, LRD, CGA (Progressing)       Start:  05/06/24    Expected End:  05/20/24

## 2024-05-08 VITALS
OXYGEN SATURATION: 97 % | TEMPERATURE: 98.1 F | HEART RATE: 91 BPM | WEIGHT: 189 LBS | BODY MASS INDEX: 31.49 KG/M2 | RESPIRATION RATE: 16 BRPM | SYSTOLIC BLOOD PRESSURE: 154 MMHG | HEIGHT: 65 IN | DIASTOLIC BLOOD PRESSURE: 88 MMHG

## 2024-05-08 LAB
ALBUMIN SERPL BCP-MCNC: 3.1 G/DL (ref 3.4–5)
ANION GAP SERPL CALC-SCNC: 14 MMOL/L (ref 10–20)
BASOPHILS # BLD AUTO: 0.05 X10*3/UL (ref 0–0.1)
BASOPHILS NFR BLD AUTO: 0.6 %
BUN SERPL-MCNC: 37 MG/DL (ref 6–23)
CALCIUM SERPL-MCNC: 8.8 MG/DL (ref 8.6–10.6)
CHLORIDE SERPL-SCNC: 106 MMOL/L (ref 98–107)
CO2 SERPL-SCNC: 20 MMOL/L (ref 21–32)
CREAT SERPL-MCNC: 4.98 MG/DL (ref 0.5–1.05)
EGFRCR SERPLBLD CKD-EPI 2021: 10 ML/MIN/1.73M*2
EOSINOPHIL # BLD AUTO: 0.28 X10*3/UL (ref 0–0.7)
EOSINOPHIL NFR BLD AUTO: 3.2 %
ERYTHROCYTE [DISTWIDTH] IN BLOOD BY AUTOMATED COUNT: 12.4 % (ref 11.5–14.5)
GLUCOSE BLD MANUAL STRIP-MCNC: 125 MG/DL (ref 74–99)
GLUCOSE BLD MANUAL STRIP-MCNC: 172 MG/DL (ref 74–99)
GLUCOSE SERPL-MCNC: 120 MG/DL (ref 74–99)
HCT VFR BLD AUTO: 35 % (ref 36–46)
HGB BLD-MCNC: 12.8 G/DL (ref 12–16)
IMM GRANULOCYTES # BLD AUTO: 0.03 X10*3/UL (ref 0–0.7)
IMM GRANULOCYTES NFR BLD AUTO: 0.3 % (ref 0–0.9)
LYMPHOCYTES # BLD AUTO: 2.88 X10*3/UL (ref 1.2–4.8)
LYMPHOCYTES NFR BLD AUTO: 32.4 %
MAGNESIUM SERPL-MCNC: 1.94 MG/DL (ref 1.6–2.4)
MCH RBC QN AUTO: 29.8 PG (ref 26–34)
MCHC RBC AUTO-ENTMCNC: 36.6 G/DL (ref 32–36)
MCV RBC AUTO: 81 FL (ref 80–100)
MONOCYTES # BLD AUTO: 0.51 X10*3/UL (ref 0.1–1)
MONOCYTES NFR BLD AUTO: 5.7 %
NEUTROPHILS # BLD AUTO: 5.13 X10*3/UL (ref 1.2–7.7)
NEUTROPHILS NFR BLD AUTO: 57.8 %
NRBC BLD-RTO: 0 /100 WBCS (ref 0–0)
PHOSPHATE SERPL-MCNC: 4.8 MG/DL (ref 2.5–4.9)
PLATELET # BLD AUTO: 318 X10*3/UL (ref 150–450)
POTASSIUM SERPL-SCNC: 3.7 MMOL/L (ref 3.5–5.3)
RBC # BLD AUTO: 4.3 X10*6/UL (ref 4–5.2)
SODIUM SERPL-SCNC: 136 MMOL/L (ref 136–145)
WBC # BLD AUTO: 8.9 X10*3/UL (ref 4.4–11.3)

## 2024-05-08 PROCEDURE — 83735 ASSAY OF MAGNESIUM: CPT

## 2024-05-08 PROCEDURE — 36415 COLL VENOUS BLD VENIPUNCTURE: CPT

## 2024-05-08 PROCEDURE — 92526 ORAL FUNCTION THERAPY: CPT | Mod: GN | Performed by: SPEECH-LANGUAGE PATHOLOGIST

## 2024-05-08 PROCEDURE — 99233 SBSQ HOSP IP/OBS HIGH 50: CPT

## 2024-05-08 PROCEDURE — 92507 TX SP LANG VOICE COMM INDIV: CPT | Mod: GN | Performed by: SPEECH-LANGUAGE PATHOLOGIST

## 2024-05-08 PROCEDURE — 2500000001 HC RX 250 WO HCPCS SELF ADMINISTERED DRUGS (ALT 637 FOR MEDICARE OP): Performed by: STUDENT IN AN ORGANIZED HEALTH CARE EDUCATION/TRAINING PROGRAM

## 2024-05-08 PROCEDURE — 82947 ASSAY GLUCOSE BLOOD QUANT: CPT

## 2024-05-08 PROCEDURE — 2500000002 HC RX 250 W HCPCS SELF ADMINISTERED DRUGS (ALT 637 FOR MEDICARE OP, ALT 636 FOR OP/ED)

## 2024-05-08 PROCEDURE — 85025 COMPLETE CBC W/AUTO DIFF WBC: CPT

## 2024-05-08 PROCEDURE — 80069 RENAL FUNCTION PANEL: CPT

## 2024-05-08 RX ORDER — NAPROXEN SODIUM 220 MG/1
81 TABLET, FILM COATED ORAL DAILY
Qty: 30 TABLET | Refills: 11 | Status: SHIPPED | OUTPATIENT
Start: 2024-05-08 | End: 2025-05-08

## 2024-05-08 RX ADMIN — INSULIN LISPRO 2 UNITS: 100 INJECTION, SOLUTION INTRAVENOUS; SUBCUTANEOUS at 13:13

## 2024-05-08 RX ADMIN — CLOPIDOGREL BISULFATE 75 MG: 75 TABLET ORAL at 08:42

## 2024-05-08 RX ADMIN — INSULIN GLARGINE 10 UNITS: 100 INJECTION, SOLUTION SUBCUTANEOUS at 09:38

## 2024-05-08 RX ADMIN — ASPIRIN 81 MG: 81 TABLET, COATED ORAL at 08:42

## 2024-05-08 ASSESSMENT — COGNITIVE AND FUNCTIONAL STATUS - GENERAL
WALKING IN HOSPITAL ROOM: A LITTLE
TURNING FROM BACK TO SIDE WHILE IN FLAT BAD: A LITTLE
DAILY ACTIVITIY SCORE: 18
CLIMB 3 TO 5 STEPS WITH RAILING: A LITTLE
PERSONAL GROOMING: A LITTLE
HELP NEEDED FOR BATHING: A LITTLE
MOVING FROM LYING ON BACK TO SITTING ON SIDE OF FLAT BED WITH BEDRAILS: A LITTLE
DRESSING REGULAR UPPER BODY CLOTHING: A LITTLE
DRESSING REGULAR LOWER BODY CLOTHING: A LITTLE
PATIENT BASELINE BEDBOUND: NO
STANDING UP FROM CHAIR USING ARMS: A LITTLE
MOVING TO AND FROM BED TO CHAIR: A LITTLE
EATING MEALS: A LITTLE
MOBILITY SCORE: 18
TOILETING: A LITTLE

## 2024-05-08 NOTE — PROGRESS NOTES
Speech-Language Pathology  Adult Inpatient Speech/Language/Cognitive Treatment    Patient Name: Tracey Stephen  MRN: [id number]  Today's Date: 5/8/2024   Start Time: 1030  Stop Time: 1115  Time Calculation (min): 45    Impression:   Speech-language treatment completed. Dysphagia tx also completed after language session.      Recommend Speech Therapy Services:   {YES/NA/NO:80889}; Targeting ***    Goal(s):        Plan:  SLP Services Indicated: {YES/NA/NO:50837}  Frequency: {THERAPEUTIC FREQUENCIES:71694}  Discussed POC with {Patient Spouse Relative Friend:45051}  SLP - OK to Discharge    Pain:   0-10  0 = No pain.     Inpatient Education:  Extensive education provided to {Patient Spouse Relative Friend:72155} regarding current speech/language/cognitive function, recommendations/results, and POC.      Consultations/Referrals/Coordination of Services:   {SLP Consults:66003}

## 2024-05-08 NOTE — DISCHARGE SUMMARY
Discharge Diagnosis  Cerebrovascular accident (CVA), unspecified mechanism (Multi)    Problem List  Principal Problem:    Cerebrovascular accident (CVA), unspecified mechanism (Multi)      Tracey Stephen is a 43 y.o. female who presented to the hospital with right sided weakness, speech difficulty. They were diagnosed with a stroke.  Etiology: Ischemic Stroke: Small-vessel disease    Relevant hospital complications: acute kidney injury  Discharge antithrombotics: aspirin  Pending evaluation: kidney function   Issues Requiring Follow-Up  Aspirin indefinitely, pt has resistance to plavix on PRU (plavix reactivity).  Kidney function, monitor creatinine, consider urinalysis and electrolytes, ensure hydration and maintain appropriate BP.  Hold metformin until kidney function improves.  Follow up plavix resistance testing result.  HbA1c control <7% (current ~10%).  LDL control <70 (current 115).    MR brain w and wo IV contrast    Result Date: 5/4/2024  Interpreted By:  Sahara Quintanilla  and Jack Marin STUDY: MR BRAIN W AND WO IV CONTRAST; MR ANGIO HEAD WO IV CONTRAST; MR ANGIO NECK WO IV CONTRAST;  5/3/2024 11:04 pm   INDICATION: Signs/Symptoms:Dysarthria, RUE weakness; prior stroke; Signs/Symptoms:RUE weakness, slurred speech; prior stroke.   COMPARISON: MRI brain and MRA head and neck dated 06/15/2023.   ACCESSION NUMBER(S): [id number]; [id number]; [id number]   ORDERING CLINICIAN: BENNY MOLINA   TECHNIQUE: Multiplanar and multisequence MRI of the brain was performed before and after intravenous administration of 17 mL of Dotarem gadolinium contrast.   Time-of-flight MRA of the head  and neck was performed. The images were reviewed as source images and maximum intensity projections.   FINDINGS: Brain:   New focus of diffusion restriction is present along the lateral aspect of the left thalamus (series 18, image 58) the expected location of the posterior limb of the left internal capsule with associated mild  hyperintense FLAIR and T2 signal changes, suggestive of acute to early subacute infarct. Additional small focus of diffusion restriction is present in the left anterior subinsular region (series 18, image 62) with mild associated FLAIR signal changes, suggestive of additional acute to early subacute infarct. There is no associated enhancement.   Mild hyperintense DWI signal is present in the periventricular white matter along the atrium of the left lateral ventricle (series 18, image 56) with associated diffusion restriction and some postcontrast enhancement (series 21, image 45).   Some hyperintense DWI signal is present in the posterior body of the right corpus callosum (series 18, image 52), with subtle associated postcontrast enhancement (series 23, image 39) significantly decreased in size from prior examination in June of 2023.   No additional areas of diffusion restriction or hyperintense DWI signal are identified intracranially.   There is no evidence of intracranial hemorrhage. No mass effect or midline shift is present.   No ventricular dilatation is present. Basal cisterns are patent. No extra-axial fluid collections are identified.   Patchy FLAIR and T2 signal abnormalities are present in the periventricular and subcortical white matter of bilateral cerebral hemispheres, demonstrate somewhat perpendicular appearance relative to the corpus callosum. No masslike enhancement is identified in the intracranially.   Small amount of mucus/secretions are present in the inferior maxillary sinuses bilaterally. No abnormal fluid signal is present in the mastoid air cells.   MRA of head:   Intracranial carotid arteries demonstrate symmetric flow enhancement without evidence of focal occlusion.   Anterior cerebral arteries proximally demonstrate symmetric flow enhancement without evidence of occlusion.   Middle cerebral arteries demonstrate symmetric flow enhancement proximally without evidence of occlusion.    Intracranial vertebral arteries, basilar artery and the posterior cerebral arteries do not demonstrate any new occlusion. There is segmental decreased flow signal again present in the left P2 segment, likely corresponding to area of moderate stenosis described on previous MRI in June of 2023 and CTA in June of 2023.   MRA of neck:   Common carotid arteries demonstrate symmetric flow enhancement without evidence of occlusion.   Visualized extracranial internal carotid arteries demonstrate symmetric flow enhancement without evidence of occlusion.   Extracranial vertebral arteries demonstrate symmetric flow enhancement without evidence of occlusion.       MRI Brain: 1. Foci of abnormal diffusion restriction within the posterior limb of the left internal capsule and the white matter of the left subinsular region with mild associated FLAIR hyperintense edema but no postcontrast enhancement are favored to represent acute to early subacute infarcts. 2. Additional focus of diffusion restriction with some postcontrast enhancement is present within the periventricular white matter of the left atrium, posterolateral to the left thalamus, possibly representing a subacute infarct, although some most suspicious for a demyelinating plaque. Multiple sclerosis workup is recommended. 3. Area of diffusion restriction described on previous MRI in June of 2023 in the posterior body of the right corpus callosum demonstrates faint residual hyperintense DWI signal with interval volume loss since prior study, although there is some faint enhancement present on postcontrast images, suspicious for demyelinating plaque. 4. Similar appearance of numerous supratentorial and brainstem lesions some of which are oriented perpendicular to the lateral ventricles suggestive of a demyelinating process such as multiple sclerosis. 5. A 0.5 cm focus of enhancement within the left caudate nucleus without associated diffusion restriction or signal  abnormality. Finding is indeterminate and may be vascular in etiology.   MRA: 1. Unchanged segmental decreased flow in the left P2 segment of the PCA corresponding to the similar location visualized on prior CT angio head corresponding to focal moderate stenosis. 2. Otherwise, no significant stenosis or arterial cutoff of the major intracranial vessels are within the neck.   I personally reviewed the images/study and I agree with the resident findings as stated. This study was interpreted at [facility], [place], [place].   MACRO: None   Signed by: Sahara Quintanilla 5/4/2024 1:50 AM Dictation workstation:   [id number]   No CT head results found for the past 14 days  Transthoracic Echo (TTE) Complete    Result Date: 5/6/2024   [facility], [address] [place] [address]                Tel [phone] and Fax [phone] TRANSTHORACIC ECHOCARDIOGRAM REPORT  Patient Name:      JAKY MILLAN         Reading Physician:    [id number] Kasey Lozoya MD Study Date:        5/6/2024             Ordering Provider:    [id number] JEREMY TOSCANO MRN/PID:           [id number]             Fellow: Accession#:        [id number]         Nurse:                Lois Llanos RN Date of Birth/Age: 1980 / 43 years Sonographer:          ELIECER Jeong RDCS Gender:            F                    Additional Staff: Height:            165.10 cm            Admit Date:           5/5/2024 Weight:            85.73 kg             Admission Status:     Inpatient -                                                               Routine BSA / BMI:         1.93 m2 / 31.45      Encounter#:           [id number]                    kg/m2                                         Department Location:  [facility]                                                                Invasive Blood Pressure: 136 /99 mmHg Study Type:    TRANSTHORACIC ECHO (TTE) COMPLETE Diagnosis/ICD: Other cerebral infarction-I63.89 Indication:    Cerebrovascular Accident CPT Code:      Echo Complete w Full Doppler-42616 Patient History: Pertinent History: H/O CVA, DMII, Htn, CKD. Study Detail: The following Echo studies were performed: 2D, M-Mode, Doppler and               color flow. Technically challenging study due to patient lying in               supine position. Agitated saline used as a contrast agent for               intraseptal flow evaluation and Definity used as a contrast agent               for endocardial border definition. The patient was awake.  PHYSICIAN INTERPRETATION: Left Ventricle: The left ventricular systolic function is normal, with an estimated ejection fraction of 55-60%. There are no regional wall motion abnormalities. The left ventricular cavity size is normal. There is mild concentric left ventricular hypertrophy. Spectral Doppler shows an impaired relaxation pattern of left ventricular diastolic filling. Left Atrium: The left atrium is normal in size. There is no evidence of a patent foramen ovale. A bubble study using agitated saline was performed. Bubble study is negative. Right Ventricle: The right ventricle is normal in size. There is normal right ventricular global systolic function. Right Atrium: The right atrium is normal in size. Aortic Valve: The aortic valve appears structurally normal. There is no evidence of aortic valve regurgitation. The peak instantaneous gradient of the aortic valve is 5.4 mmHg. Mitral Valve: The mitral valve is normal in structure. There is trace mitral valve regurgitation. Tricuspid Valve: The tricuspid valve is structurally normal. There is trace tricuspid regurgitation. Pulmonic Valve: The pulmonic valve is structurally normal. There is trace pulmonic valve regurgitation. Pericardium: There is no pericardial effusion noted. Aorta: The  aortic root is normal. Systemic Veins: The inferior vena cava appears to be of normal size. There is IVC inspiratory collapse greater than 50%. In comparison to the previous echocardiogram(s): There are no prior studies on this patient for comparison purposes.  CONCLUSIONS:  1. Left ventricular systolic function is normal with a 55-60% estimated ejection fraction.  2. Spectral Doppler shows an impaired relaxation pattern of left ventricular diastolic filling.  3. There is no evidence of a patent foramen ovale. QUANTITATIVE DATA SUMMARY: 2D MEASUREMENTS:                          Normal Ranges: Ao Root d:     3.60 cm   (2.0-3.7cm) LAs:           2.89 cm   (2.7-4.0cm) RVIDd:         2.25 cm   (0.9-3.6cm) IVSd:          1.29 cm   (0.6-1.1cm) LVPWd:         1.17 cm   (0.6-1.1cm) LVIDd:         4.15 cm   (3.9-5.9cm) LVIDs:         2.80 cm LV Mass Index: 94.2 g/m2 LV % FS        32.7 % LA VOLUME:                              Normal Ranges: LA Vol A4C:       35.0 ml    (22+/-6mL/m2) LA Vol A2C:       50.9 ml LA Vol BP:        42.9 ml LA Vol Index A4C: 18.1 ml/m2 LA Vol Index A2C: 26.4 ml/m2 LA Vol Index BP:  22.2 ml/m2 LA Vol A4C:       32.1 ml LA Vol A2C:       48.8 ml RA VOLUME BY A/L METHOD:                               Normal Ranges: RA Vol A4C:        44.2 ml    (8.3-19.5ml) RA Vol Index A4C:  22.9 ml/m2 RA Area A4C:       15.3 cm2 RA Major Axis A4C: 4.5 cm AORTA MEASUREMENTS:                    Normal Ranges: Asc Ao, d: 3.30 cm (2.1-3.4cm) LV SYSTOLIC FUNCTION BY 2D PLANIMETRY (MOD):                     Normal Ranges: EF-A4C View: 55.7 % (>=55%) EF-A2C View: 63.6 % EF-Biplane:  57.5 % LV DIASTOLIC FUNCTION:                              Normal Ranges: MV Peak E:        0.47 m/s   (0.7-1.2 m/s) MV Peak A:        0.71 m/s   (0.42-0.7 m/s) E/A Ratio:        0.66       (1.0-2.2) MV e'             0.06 m/s   (>8.0) MV lateral e'     0.08 m/s MV medial e'      0.04 m/s MV A Dur:         87.54 msec E/e' Ratio:       7.89        (<8.0) a'                0.08 m/s PulmV Sys Sohail:    41.70 cm/s PulmV Shafer Sohail:   30.95 cm/s PulmV S/D Sohail:    1.35 PulmV A Revs Sohail: 28.15 cm/s PulmV A Revs Dur: 95.15 msec AORTIC VALVE:                         Normal Ranges: AoV Vmax:      1.17 m/s (<=1.7m/s) AoV Peak P.4 mmHg (<20mmHg) LVOT Max Sohail:  0.97 m/s (<=1.1m/s) LVOT VTI:      19.60 cm LVOT Diameter: 2.15 cm  (1.8-2.4cm) AoV Area,Vmax: 3.01 cm2 (2.5-4.5cm2)  RIGHT VENTRICLE: RV Basal 3.60 cm RV Mid   2.20 cm RV Major 6.9 cm TAPSE:   19.3 mm RV s'    0.12 m/s PULMONIC VALVE:                      Normal Ranges: PV Max Sohail: 0.9 m/s  (0.6-0.9m/s) PV Max PG:  3.0 mmHg Pulmonary Veins: PulmV A Revs Dur: 95.15 msec PulmV A Revs Sohail: 28.15 cm/s PulmV Shafer Sohail:   30.95 cm/s PulmV S/D Sohail:    1.35 PulmV Sys Sohail:    41.70 cm/s AORTA: Asc Ao Diam 3.34 cm  [id number] Kasey Lozoya MD Electronically signed on 2024 at 3:09:51 PM  ** Final **        Results from last 7 days   Lab Units 24  1207   HEMOGLOBIN A1C % 10.6*     BNP   Date/Time Value Ref Range Status   2024 12:07 PM <2 0 - 99 pg/mL Final       Test Results Pending At Discharge  Pending Labs       No current pending labs.            Hospital Course  Tracey Stephen is a 43 y.o. RH female with a PMHx of BPD type II, NUBIA, insulin-dependent T2DM c/b retinopathy, HTN, prior R cingulate gyrus/corpus callosum and L frontal infarcts (2023) w/ residual RLE weakness, who [facility] to [facility] on 24 as a BAT for speech difficulty, worsening, R sided weakness. She was already diagnosed with L PLIC and insular stroke on 5/3 however left AMA. Treated with DAPT without load. Given she is worsening and has presumed small vessel disease stroke at this time we will treat with Plavix load as we believe this could be a stuttering lacunar infarct.     The patient's hospital course was complicated by increasing serum creatinine from 1.59 () to 4.98 (). Urine studies yet to be collected. Renal ultrasound  showed cortical echogenicity but no hydronephrosis and bladder distention. It is thought to due to iodinated contrast and/or BP fluctuation. 1L NS IV bolus given without improvement, another bolus being given. Hold metformin should be held until kidney function improves.    PRU (plavix reactivity) was 211 indicating non-response to plavix. The patient should continue on aspirin.    , HbA1c 10.6%  TTE EF 55-60%, no PFO, LA nl size     CTA: moderate L P2 stenosis.    Pertinent Physical Exam At Time of Discharge  Physical Exam  Neurological Exam  GENERAL APPEARANCE:  No distress, alert and cooperative.   CARDIOVASCULAR: Regular, rate and rhythm, without murmur. No carotid bruits.   MENTAL STATE:  Orientation was normal to time, place and person. Language was notable for delays in speech largely resembling stuttering.   CRANIAL NERVES:  Cranial nerves were normal.      CN 2- Visual fields full to confrontation.      CN 3, 4, 6-  Pupils round, 4 mm in diameter, equally reactive to light. No ptosis. EOMs normal alignment, full range of movement, no nystagmus     CN 5- Facial sensation intact bilaterally. Normal corneal reflexes.      CN 7- Mild R FD.     CN 8- Hearing intact to finger rub, whisper.      CN 9- Palate elevates symmetrically. Dysarthric with hoarseness.      CN 11- Normal strength of shoulder shrug and neck turning      CN 12- Tongue midline, with normal bulk and strength; no fasciculations.   MOTOR:  Noted to have mild RUE spasticity. Muscle strength was 5/5 in distal and proximal muscles in both LUE and LLE. Grossly 4/5 strength in RLE and RUE. No fasciculations, tremor or other abnormal movements were present.   REFLEXES:  Right/ Left:  Biceps 2/2, brachioradialis 2/2, triceps 2/2, patellar 0/0, ankle 0/0  Babinski: toes downgoing to plantar stimulation. No clonus, frontal release signs or other pathologic reflexes present.   SENSORY: Dense hemibody sensory loss on R.   COORDINATION:  FNF intact  BL.     GAIT: Able to stand and appears to fall to R while trying to walk unassisted.       NIH Stroke Scale  1A. Level of Consciousness: Alert, Keenly Responsive  1B. Ask Month and Age: Both Questions Right  1C. Blink Eyes & Squeeze Hands: Performs Both Tasks  2. Best Gaze: Normal  3. Visual: No Visual Loss  4. Facial Palsy: Minor Paralysis  5A. Motor - Left Arm: No Drift  5B. Motor - Right Arm: Drift  6A. Motor - Left Leg: No Drift  6B. Motor - Right Leg: Drift  7. Limb Ataxia: Absent  8. Sensory Loss: Mild-to-Moderate Sensory Loss  9. Best Language: Mild-to-Moderate Aphasia  10. Dysarthria: Mild-to-Moderate Dysarthria  11. Extinction and Inattention: No Abnormality  NIH Stroke Scale: 6     Outpatient Follow-Up  Future Appointments   Date Time Provider Department Center   6/25/2024 10:30 AM Saida Baker MD [id number] [facility]     Kale Mario MD

## 2024-05-08 NOTE — PROGRESS NOTES
"Tracey Stephen is a 43 y.o. female on day 3 of admission presenting with Cerebrovascular accident (CVA), unspecified mechanism (Multi).    Subjective   NAEON       Objective     Last Recorded Vitals  Blood pressure (!) 166/91, pulse 87, temperature 36.9 °C (98.4 °F), resp. rate 18, height 1.651 m (5' 5\"), weight 85.7 kg (189 lb), SpO2 97%.    Physical Exam  Neurological Exam  GENERAL APPEARANCE:  No distress, alert and cooperative.   CARDIOVASCULAR: Regular, rate and rhythm, without murmur. No carotid bruits.   MENTAL STATE:  Orientation was normal to time, place and person. Language was notable for delays in speech as well as word finding difficulty. Expressive > receptive aphasia.   CRANIAL NERVES:  Cranial nerves were normal.      CN 2- Visual fields full to confrontation.      CN 3, 4, 6-  Pupils round, 4 mm in diameter, equally reactive to light. No ptosis. EOMs normal alignment, full range of movement, no nystagmus     CN 5- Facial sensation intact bilaterally. Normal corneal reflexes.      CN 7- Mild R FD.     CN 8- Hearing intact to finger rub, whisper.      CN 9- Palate elevates symmetrically. Dysarthric with hoarseness.      CN 11- Normal strength of shoulder shrug and neck turning      CN 12- Tongue midline, with normal bulk and strength; no fasciculations.   MOTOR:  Noted to have mild RUE spasticity. Muscle strength was 5/5 in distal and proximal muscles in both LUE and LLE. Grossly 4/5 strength in RLE and RUE. No fasciculations, tremor or other abnormal movements were present.   REFLEXES:  Right/ Left:  Biceps 2/2, brachioradialis 2/2, triceps 2/2, patellar 0/0, ankle 0/0  Babinski: toes downgoing to plantar stimulation. No clonus, frontal release signs or other pathologic reflexes present.   SENSORY: Dense hemibody sensory loss on R.   COORDINATION:  FNF intact BL.     GAIT: Able to stand and appears to fall to R while trying to walk unassisted.      Relevant Results    NIH Stroke Scale  1A. Level of " Consciousness: Alert, Keenly Responsive  1B. Ask Month and Age: Both Questions Right  1C. Blink Eyes & Squeeze Hands: Performs Both Tasks  2. Best Gaze: Normal  3. Visual: No Visual Loss  4. Facial Palsy: Minor Paralysis  5A. Motor - Left Arm: No Drift  5B. Motor - Right Arm: Drift  6A. Motor - Left Leg: No Drift  6B. Motor - Right Leg: Drift  7. Limb Ataxia: Absent  8. Sensory Loss: Mild-to-Moderate Sensory Loss  9. Best Language: Mild-to-Moderate Aphasia  10. Dysarthria: Mild-to-Moderate Dysarthria  11. Extinction and Inattention: No Abnormality  NIH Stroke Scale: 6           Haley Coma Scale  Best Eye Response: Spontaneous  Best Verbal Response: Oriented  Best Motor Response: Follows commands  Speer Coma Scale Score: 15                            Assessment/Plan      Principal Problem:    Cerebrovascular accident (CVA), unspecified mechanism (Multi)    Tracey Stephen is a 43 y.o. RH female with a PMHx of BPD type II, NUBIA, insulin-dependent T2DM c/b retinopathy, HTN, prior R cingulate gyrus/corpus callosum and L frontal infarcts (5/2023) w/ residual RLE weakness, who presents to [facility] on 5/5/24 as a BAT for speech difficulty, worsening, R sided weakness.     She was already diagnosed with L PLIC and insular stroke on 5/3 however left AMA. Treated with DAPT without load. Given she is worsening and has presumed small vessel disease stroke at this time we will treat with Plavix load as we believe this could be a stuttering lacunar infarct.      In regard to long term stroke prevention this is a patient who is of poor health literacy and poor medication compliance and given lack of any clear findings in regard to a hypercoagulable workup we would opt with trying to control her poorly controlled risk factors.      Stroke Metrics:  EMS pre-notification?: Yes  Time of stroke team arrival: Within 5 minutes of page  Direct to CT?: Yes  Time of CTH read by stroke team: As performed     Stroke Classification:  Type:  Ischemic stroke  Subtype/etiology:   Vessels involved: L MCA branches  Neurological manifestations:  Pre-Intervention Deficits: NIHSS 7  Pre-stroke mRS: 2  Initial treatment: Plavix  Anti-platelets or Anti-coagulation management: DAPT  Vascular Risk Factors: DM2, Smoking (THC), HLD  Antiplatelet/antithrombotic plan for stroke prevention: DAPT 21 days then aspirin   VTE prophylaxis: Lovenox     Vascular Risk Factor modification goals:  Blood pressure goals: avoid hypotension SBP <100 that could worsen cerebral perfusion, Ischemic stroke- early permissive hypertension SBP < 220 mmHg with cautious inpatient lowering  Lipid Goals: education on healthy diet and statin therapy to maintain or achieve goal LDL-cholesterol < 70mg  Glucose Goals: early treatment of hyperglycemia to goal glucose 140-180 mg/dl with long-term goal A1c < 7%   Smoking Cessation and Education  Assessment for Rehabilitation needs   Patient and family education on signs and symptoms of stroke, calling 911, healthy strategies for stroke prevention.       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 and L frontal infarcts   - Admit to floor with tele  - S/p Plavix 300 mg in ED  - home antiplatelet is plavix, resume indefinitely  - ASA 81mg daily for total 21 days  - Atorva 40mg QHS   - goal BP normotension  - TTE EF 55-60%, no PFO, LA nl size  - , A1c 10.6  - send PRU testing for plavix resistance     #EMRE  #HypoMg  - Cr 1.59 > 4.24  - Mg 1.48 > 2.2  - given NS 1000 IV, 2g Mg IV (renally adjusted)  - IZABEL with echogenic cortices, no bladder distention or hydronephrosis  - follow RFP  - appreciate nephrology recs    #DM  - start basal glargine 10u BID (home dose 20u BID)  - ISS #2  - refill home meds (pt ran out)    #HTN  - EKG nsr  - Permissive HTN; -220     #HLD  - Atrovastatin 40 mg QHS     #Bipolar Depression  #NUBIA  - Can establish with psychiatry as outpatient     F:  Bolus PRN  E: K > 4; Phos > 3; Mg > 2  N: diabetic  C: Full Code  A: PIV  P: Ez ZABALA (Mother Kiersten) [phone]     Kale Mario MD  Neurology PGY-2  Stroke team pager [phone]

## 2024-05-09 ENCOUNTER — LAB REQUISITION (OUTPATIENT)
Dept: LAB | Facility: HOSPITAL | Age: 44
End: 2024-05-09
Payer: COMMERCIAL

## 2024-05-09 DIAGNOSIS — Z51.89 ENCOUNTER FOR OTHER SPECIFIED AFTERCARE: ICD-10-CM

## 2024-05-09 LAB
ALBUMIN SERPL BCP-MCNC: 3.3 G/DL (ref 3.4–5)
ALP SERPL-CCNC: 75 U/L (ref 33–110)
ALT SERPL W P-5'-P-CCNC: 6 U/L (ref 7–45)
ANION GAP SERPL CALC-SCNC: 16 MMOL/L (ref 10–20)
AST SERPL W P-5'-P-CCNC: 10 U/L (ref 9–39)
BILIRUB SERPL-MCNC: 0.5 MG/DL (ref 0–1.2)
BUN SERPL-MCNC: 40 MG/DL (ref 6–23)
CALCIUM SERPL-MCNC: 8.1 MG/DL (ref 8.6–10.3)
CHLORIDE SERPL-SCNC: 105 MMOL/L (ref 98–107)
CO2 SERPL-SCNC: 20 MMOL/L (ref 21–32)
CREAT SERPL-MCNC: 4.14 MG/DL (ref 0.5–1.05)
EGFRCR SERPLBLD CKD-EPI 2021: 13 ML/MIN/1.73M*2
ERYTHROCYTE [DISTWIDTH] IN BLOOD BY AUTOMATED COUNT: 12.8 % (ref 11.5–14.5)
GLUCOSE SERPL-MCNC: 145 MG/DL (ref 74–99)
HCT VFR BLD AUTO: 36.1 % (ref 36–46)
HGB BLD-MCNC: 12.3 G/DL (ref 12–16)
MCH RBC QN AUTO: 30 PG (ref 26–34)
MCHC RBC AUTO-ENTMCNC: 34.1 G/DL (ref 32–36)
MCV RBC AUTO: 88 FL (ref 80–100)
NRBC BLD-RTO: 0 /100 WBCS (ref 0–0)
PLATELET # BLD AUTO: 313 X10*3/UL (ref 150–450)
POTASSIUM SERPL-SCNC: 3.8 MMOL/L (ref 3.5–5.3)
PROT SERPL-MCNC: 5.8 G/DL (ref 6.4–8.2)
RBC # BLD AUTO: 4.1 X10*6/UL (ref 4–5.2)
SODIUM SERPL-SCNC: 137 MMOL/L (ref 136–145)
WBC # BLD AUTO: 7.9 X10*3/UL (ref 4.4–11.3)

## 2024-05-09 PROCEDURE — 80053 COMPREHEN METABOLIC PANEL: CPT

## 2024-05-09 PROCEDURE — 85027 COMPLETE CBC AUTOMATED: CPT

## 2024-05-10 LAB
ATRIAL RATE: 90 BPM
P AXIS: 60 DEGREES
P OFFSET: 212 MS
P ONSET: 157 MS
PR INTERVAL: 134 MS
Q ONSET: 224 MS
QRS COUNT: 15 BEATS
QRS DURATION: 106 MS
QT INTERVAL: 330 MS
QTC CALCULATION(BAZETT): 403 MS
QTC FREDERICIA: 377 MS
R AXIS: 32 DEGREES
T AXIS: 15 DEGREES
T OFFSET: 389 MS
VENTRICULAR RATE: 90 BPM

## 2024-05-13 ENCOUNTER — LAB REQUISITION (OUTPATIENT)
Dept: LAB | Facility: HOSPITAL | Age: 44
End: 2024-05-13
Payer: COMMERCIAL

## 2024-05-13 DIAGNOSIS — Z51.89 ENCOUNTER FOR OTHER SPECIFIED AFTERCARE: ICD-10-CM

## 2024-05-13 LAB
ANION GAP SERPL CALC-SCNC: 15 MMOL/L (ref 10–20)
BUN SERPL-MCNC: 32 MG/DL (ref 6–23)
CALCIUM SERPL-MCNC: 7.8 MG/DL (ref 8.6–10.3)
CHLORIDE SERPL-SCNC: 105 MMOL/L (ref 98–107)
CO2 SERPL-SCNC: 20 MMOL/L (ref 21–32)
CREAT SERPL-MCNC: 2.24 MG/DL (ref 0.5–1.05)
EGFRCR SERPLBLD CKD-EPI 2021: 27 ML/MIN/1.73M*2
ERYTHROCYTE [DISTWIDTH] IN BLOOD BY AUTOMATED COUNT: 12.8 % (ref 11.5–14.5)
GLUCOSE SERPL-MCNC: 143 MG/DL (ref 74–99)
HCT VFR BLD AUTO: 36.9 % (ref 36–46)
HGB BLD-MCNC: 12.4 G/DL (ref 12–16)
MCH RBC QN AUTO: 29.7 PG (ref 26–34)
MCHC RBC AUTO-ENTMCNC: 33.6 G/DL (ref 32–36)
MCV RBC AUTO: 89 FL (ref 80–100)
NRBC BLD-RTO: 0 /100 WBCS (ref 0–0)
PLATELET # BLD AUTO: 324 X10*3/UL (ref 150–450)
POTASSIUM SERPL-SCNC: 3.7 MMOL/L (ref 3.5–5.3)
RBC # BLD AUTO: 4.17 X10*6/UL (ref 4–5.2)
SODIUM SERPL-SCNC: 136 MMOL/L (ref 136–145)
WBC # BLD AUTO: 8 X10*3/UL (ref 4.4–11.3)

## 2024-05-13 PROCEDURE — 80048 BASIC METABOLIC PNL TOTAL CA: CPT

## 2024-05-13 PROCEDURE — 85027 COMPLETE CBC AUTOMATED: CPT

## 2024-05-21 ENCOUNTER — OFFICE VISIT (OUTPATIENT)
Dept: OBSTETRICS AND GYNECOLOGY | Facility: CLINIC | Age: 44
End: 2024-05-21
Payer: COMMERCIAL

## 2024-05-21 VITALS
BODY MASS INDEX: 29.99 KG/M2 | DIASTOLIC BLOOD PRESSURE: 80 MMHG | HEIGHT: 65 IN | SYSTOLIC BLOOD PRESSURE: 122 MMHG | WEIGHT: 180 LBS

## 2024-05-21 DIAGNOSIS — N92.6 IRREGULAR BLEEDING: ICD-10-CM

## 2024-05-21 DIAGNOSIS — N76.0 ACUTE VAGINITIS: Primary | ICD-10-CM

## 2024-05-21 PROCEDURE — 87624 HPV HI-RISK TYP POOLED RSLT: CPT

## 2024-05-21 PROCEDURE — 87205 SMEAR GRAM STAIN: CPT

## 2024-05-21 PROCEDURE — 3046F HEMOGLOBIN A1C LEVEL >9.0%: CPT | Performed by: OBSTETRICS & GYNECOLOGY

## 2024-05-21 PROCEDURE — 3049F LDL-C 100-129 MG/DL: CPT | Performed by: OBSTETRICS & GYNECOLOGY

## 2024-05-21 PROCEDURE — 3074F SYST BP LT 130 MM HG: CPT | Performed by: OBSTETRICS & GYNECOLOGY

## 2024-05-21 PROCEDURE — 3008F BODY MASS INDEX DOCD: CPT | Performed by: OBSTETRICS & GYNECOLOGY

## 2024-05-21 PROCEDURE — 3079F DIAST BP 80-89 MM HG: CPT | Performed by: OBSTETRICS & GYNECOLOGY

## 2024-05-21 PROCEDURE — 88142 CYTOPATH C/V THIN LAYER: CPT

## 2024-05-21 PROCEDURE — 99204 OFFICE O/P NEW MOD 45 MIN: CPT | Performed by: OBSTETRICS & GYNECOLOGY

## 2024-05-21 RX ORDER — METRONIDAZOLE 500 MG/1
500 TABLET ORAL 2 TIMES DAILY
Qty: 14 TABLET | Refills: 0 | Status: SHIPPED | OUTPATIENT
Start: 2024-05-21 | End: 2024-05-28

## 2024-05-21 RX ORDER — INSULIN GLARGINE 100 [IU]/ML
30 INJECTION, SOLUTION SUBCUTANEOUS DAILY
COMMUNITY
Start: 2023-08-31

## 2024-05-21 RX ORDER — FLUCONAZOLE 150 MG/1
150 TABLET ORAL SEE ADMIN INSTRUCTIONS
Qty: 2 TABLET | Refills: 0 | Status: SHIPPED | OUTPATIENT
Start: 2024-05-21

## 2024-05-21 RX ORDER — HYDROCHLOROTHIAZIDE 25 MG/1
TABLET ORAL
COMMUNITY
Start: 2024-05-15

## 2024-05-21 RX ORDER — MIRTAZAPINE 7.5 MG/1
TABLET, FILM COATED ORAL
COMMUNITY
Start: 2024-05-13

## 2024-05-21 RX ORDER — INSULIN LISPRO 100 [IU]/ML
10 INJECTION, SOLUTION INTRAVENOUS; SUBCUTANEOUS
COMMUNITY
Start: 2023-05-20

## 2024-05-21 NOTE — PROGRESS NOTES
"Chief Complaint   Patient presents with    Vaginal Bleeding     New Patient    Irregular bleeding x 6months, \"pink\"  - hysterectomy 11 years ago for fibroids  - recent stroke, just out of rehab    Chaperone declined     Recent hospitalization for CVA    Discharge summary, copy and paste    \"Hospital Course  Tracey Stephen is a 43 y.o. RH female with a PMHx of BPD type II, NUBIA, insulin-dependent T2DM c/b retinopathy, HTN, prior R cingulate gyrus/corpus callosum and L frontal infarcts (5/2023) w/ residual RLE weakness, who presents to [facility] on 5/5/24 as a BAT for speech difficulty, worsening, R sided weakness. She was already diagnosed with L PLIC and insular stroke on 5/3 however left AMA. Treated with DAPT without load. Given she is worsening and has presumed small vessel disease stroke at this time we will treat with Plavix load as we believe this could be a stuttering lacunar infarct.      The patient's hospital course was complicated by increasing serum creatinine from 1.59 (5/5) to 4.98 (5/8). Urine studies yet to be collected. Renal ultrasound showed cortical echogenicity but no hydronephrosis and bladder distention. It is thought to due to iodinated contrast and/or BP fluctuation. 1L NS IV bolus given without improvement, another bolus being given. Hold metformin should be held until kidney function improves.\"    Patient is a 43-year-old G3, P3 with recent hospitalization for CVA.  Was in rehab for a few days and now recovering, outpatient.  Still has some deficit of right leg, right arm and speech.  Has no processing deficits.  While in the hospital, noticed pinkish spotting.    Of note, patient is status post hysterectomy 11 years ago.  She is unsure whether the cervix was retained or not.    Notes vag odor    REVIEW OF SYSTEMS    Please see HPI for reported pertinent positives, which would supersede this ROS    Constitutional:  Denies fever, chills, wt gain or loss, fatigue    Genito-Urinary:  Denies genital " lesion or sores, vaginal dryness, itching  or pain.  No abnormal vaginal discharge or unexplained vaginal bleeding.  No dysuria, urinary incontinence or frequency.  Denies pelvic pain, dysmenorrhea or dyspareunia.    Eyes:  Denies vision changes, dryness  ENT:  No hearing loss, sinus pain or congestion, nosebleeds  Cardiovascular:  No chest pain or palpitations  Respiratory:  No SOB, cough, wheezing  GI:  No Nausea, vomiting, diarrhea, constipation, abdominal pain  Musculoskeletal:  No new back pain. joint pain, peripheral edema  Skin:  No rash or skin lesion  Neurologic:  No HA, numbness or dizziness  Psychiatric:  No new anxiety or depression  Endocrine:  No loss of hair or hirsutism  Hematologic/lymphatic:  No swollen lymph nodes.  No reported tendency for easy bruising or bleeding    Patient admits to no other systemic complaints      Vitals:    05/21/24 0818   BP: 122/80       PHYSICAL EXAM      PSYCH:  Pt is alert, oriented and cooperative    SKIN: warm, dry, w/o lesion    HEAD AND FACE:  External inspection of eyes, ears, functional cranial nerves normal and intact    THYROID:  No thyromegaly    CARDIOVASCULAR:  Warm and well Perfused    PULMONARY:  No respiratory distress    ABDOMEN:  soft, nontender.  No mass or organomegaly.      PELVIC:    External genitalia, urethra, perianal region normal to inspection and palpation if indicated.  No inguinal LA    Vagina without lesions.    Cervix not visualized at apex of vagina  Pap of vag apex done  Gram stain sent      Bimanual exam:      No pelvic mass palpable    Assessment/Plan    Diagnoses and all orders for this visit:  Acute vaginitis  -     Vaginitis Gram Stain For Bacterial Vaginosis + Yeast  -     metroNIDAZOLE (Flagyl) 500 mg tablet; Take 1 tablet (500 mg) by mouth 2 times a day for 7 days.  -     fluconazole (Diflucan) 150 mg tablet; Take 1 tablet (150 mg) by mouth see administration instructions for 2 doses. Take one tab now and repeat in 3  days  Irregular bleeding -no evidence of bleeding to today's exam.  Pt is s/p hysterectomy and clinical pelvic exam normal to visual inspection.  Have obtain cytology from vaginal apex.    Addendum:    Final pathology from total laparoscopic hysterectomy found from 2018.  Pathology involves the cervix.  Endometriosis seen on the tubes and fibroids seen as well.  Therefore total laparoscopic hysterectomy done, not supracervical hysterectomy.

## 2024-05-22 LAB
CLUE CELLS VAG LPF-#/AREA: ABNORMAL /[LPF]
NUGENT SCORE: 9
YEAST VAG WET PREP-#/AREA: ABNORMAL

## 2024-05-30 LAB
CYTOLOGY CMNT CVX/VAG CYTO-IMP: NORMAL
HPV HR 12 DNA GENITAL QL NAA+PROBE: POSITIVE
HPV HR GENOTYPES PNL CVX NAA+PROBE: POSITIVE
HPV16 DNA SPEC QL NAA+PROBE: NEGATIVE
HPV18 DNA SPEC QL NAA+PROBE: NEGATIVE
LAB AP HPV GENOTYPE QUESTION: YES
LAB AP HPV HR: NORMAL
LABORATORY COMMENT REPORT: NORMAL
LABORATORY COMMENT REPORT: NORMAL
MENSTRUAL HX REPORTED: NORMAL
PATH REPORT.TOTAL CANCER: NORMAL

## 2024-06-04 NOTE — TELEPHONE ENCOUNTER
Byron fontana [facility] called r/t follow-up of refill request previously faxed over. No medications listed on voicemail. Call to pharmacy proven ineffective.

## 2024-06-25 ENCOUNTER — APPOINTMENT (OUTPATIENT)
Dept: OPHTHALMOLOGY | Facility: CLINIC | Age: 44
End: 2024-06-25
Payer: COMMERCIAL

## 2024-06-25 DIAGNOSIS — Z86.73 HISTORY OF CVA IN ADULTHOOD: ICD-10-CM

## 2024-06-25 DIAGNOSIS — H53.8 BLURRED VISION: ICD-10-CM

## 2024-06-25 DIAGNOSIS — E11.3413 SEVERE NONPROLIFERATIVE DIABETIC RETINOPATHY OF BOTH EYES WITH MACULAR EDEMA ASSOCIATED WITH TYPE 2 DIABETES MELLITUS (MULTI): Primary | ICD-10-CM

## 2024-06-25 DIAGNOSIS — H25.13 NUCLEAR SCLEROTIC CATARACT OF BOTH EYES: ICD-10-CM

## 2024-06-25 PROCEDURE — 92012 INTRM OPH EXAM EST PATIENT: CPT | Performed by: OPHTHALMOLOGY

## 2024-06-25 ASSESSMENT — SLIT LAMP EXAM - LIDS
COMMENTS: NORMAL
COMMENTS: NORMAL

## 2024-06-25 ASSESSMENT — EXTERNAL EXAM - RIGHT EYE: OD_EXAM: NORMAL

## 2024-06-25 ASSESSMENT — VISUAL ACUITY
OS_CC: 20/200
OD_CC: 20/150
METHOD: SNELLEN - LINEAR

## 2024-06-25 ASSESSMENT — TONOMETRY
IOP_METHOD: GOLDMANN APPLANATION
OD_IOP_MMHG: 13
OS_IOP_MMHG: 13

## 2024-06-25 ASSESSMENT — EXTERNAL EXAM - LEFT EYE: OS_EXAM: NORMAL

## 2024-06-25 NOTE — PROGRESS NOTES
Assessment/Plan   Diagnoses and all orders for this visit:  Severe nonproliferative diabetic retinopathy of both eyes with macular edema associated with type 2 diabetes mellitus (Multi)  -keep appointment with Dr. Lamb for diabetic retinopathy management    Nuclear sclerotic cataract of both eyes  continue to monitor    Blurred vision  -hold off on glasses until macular edema improves    History of CVA in adulthood    Return for a dilated exam in  6    months or sooner if having any problems  Refract at next visit

## 2024-07-16 ENCOUNTER — APPOINTMENT (OUTPATIENT)
Dept: PRIMARY CARE | Facility: CLINIC | Age: 44
End: 2024-07-16
Payer: COMMERCIAL

## 2024-07-31 ENCOUNTER — APPOINTMENT (OUTPATIENT)
Dept: OPHTHALMOLOGY | Facility: CLINIC | Age: 44
End: 2024-07-31
Payer: COMMERCIAL

## 2024-07-31 DIAGNOSIS — E11.65 TYPE 2 DIABETES MELLITUS WITH HYPERGLYCEMIA, WITH LONG-TERM CURRENT USE OF INSULIN (MULTI): ICD-10-CM

## 2024-07-31 DIAGNOSIS — E11.3413 SEVERE NONPROLIFERATIVE DIABETIC RETINOPATHY OF BOTH EYES WITH MACULAR EDEMA ASSOCIATED WITH TYPE 2 DIABETES MELLITUS (MULTI): Primary | ICD-10-CM

## 2024-07-31 DIAGNOSIS — Z79.4 TYPE 2 DIABETES MELLITUS WITH HYPERGLYCEMIA, WITH LONG-TERM CURRENT USE OF INSULIN (MULTI): ICD-10-CM

## 2024-07-31 PROCEDURE — 99214 OFFICE O/P EST MOD 30 MIN: CPT

## 2024-07-31 PROCEDURE — 67028 INJECTION EYE DRUG: CPT | Mod: BILATERAL PROCEDURE

## 2024-07-31 PROCEDURE — 92134 CPTRZ OPH DX IMG PST SGM RTA: CPT

## 2024-07-31 ASSESSMENT — ENCOUNTER SYMPTOMS
PSYCHIATRIC NEGATIVE: 0
CONSTITUTIONAL NEGATIVE: 0
RESPIRATORY NEGATIVE: 0
GASTROINTESTINAL NEGATIVE: 0
NEUROLOGICAL NEGATIVE: 0
ENDOCRINE NEGATIVE: 0
MUSCULOSKELETAL NEGATIVE: 0
CARDIOVASCULAR NEGATIVE: 0
EYES NEGATIVE: 0
ALLERGIC/IMMUNOLOGIC NEGATIVE: 0
HEMATOLOGIC/LYMPHATIC NEGATIVE: 0

## 2024-07-31 ASSESSMENT — VISUAL ACUITY
METHOD: SNELLEN - LINEAR
OS_CC: 20/60
OD_CC+: -1
CORRECTION_TYPE: GLASSES
OS_CC+: -2
OD_CC: 20/50

## 2024-07-31 ASSESSMENT — CUP TO DISC RATIO
OS_RATIO: 0.35
OD_RATIO: 0.35

## 2024-07-31 ASSESSMENT — EXTERNAL EXAM - LEFT EYE: OS_EXAM: NORMAL

## 2024-07-31 ASSESSMENT — SLIT LAMP EXAM - LIDS
COMMENTS: NORMAL
COMMENTS: NORMAL

## 2024-07-31 ASSESSMENT — CONF VISUAL FIELD
OD_INFERIOR_TEMPORAL_RESTRICTION: 0
OD_NORMAL: 1
OS_INFERIOR_NASAL_RESTRICTION: 0
OS_SUPERIOR_TEMPORAL_RESTRICTION: 0
OD_SUPERIOR_NASAL_RESTRICTION: 0
OS_SUPERIOR_NASAL_RESTRICTION: 0
OS_INFERIOR_TEMPORAL_RESTRICTION: 0
OD_SUPERIOR_TEMPORAL_RESTRICTION: 0
OD_INFERIOR_NASAL_RESTRICTION: 0
OS_NORMAL: 1

## 2024-07-31 ASSESSMENT — TONOMETRY
OD_IOP_MMHG: 14
OS_IOP_MMHG: 15
IOP_METHOD: TONOPEN

## 2024-07-31 ASSESSMENT — EXTERNAL EXAM - RIGHT EYE: OD_EXAM: NORMAL

## 2024-07-31 NOTE — PROGRESS NOTES
#Severe nonproliferative diabetic retinopathy of right eye with macular edema associated with type 2 diabetes mellitus (CMS/HCC)  -Hx of DM 20 years  -Last A1c 13.8% 6/2023  -DME present both eyes  -Reports previous laser and eye injections about 3 years ago, unsure of provider  -Limited BCVA both eyes today, with central DME present both eyes    #PDR OS  Faint NVD   Low risk PDR    Plan  -Discussed r/b/a of treatment options, patient agree to IVTs  - PERICO was given OU  - RTC in 4 weeks    #Cataracts both eyes  -Managed by Dr. Baker

## 2024-08-13 ENCOUNTER — APPOINTMENT (OUTPATIENT)
Dept: OBSTETRICS AND GYNECOLOGY | Facility: CLINIC | Age: 44
End: 2024-08-13
Payer: COMMERCIAL

## 2024-08-13 VITALS — BODY MASS INDEX: 32.49 KG/M2 | HEIGHT: 65 IN | WEIGHT: 195 LBS

## 2024-08-13 DIAGNOSIS — N92.6 IRREGULAR BLEEDING: ICD-10-CM

## 2024-08-13 PROCEDURE — 3049F LDL-C 100-129 MG/DL: CPT | Performed by: OBSTETRICS & GYNECOLOGY

## 2024-08-13 PROCEDURE — 3046F HEMOGLOBIN A1C LEVEL >9.0%: CPT | Performed by: OBSTETRICS & GYNECOLOGY

## 2024-08-13 PROCEDURE — 87591 N.GONORRHOEAE DNA AMP PROB: CPT

## 2024-08-13 PROCEDURE — 3008F BODY MASS INDEX DOCD: CPT | Performed by: OBSTETRICS & GYNECOLOGY

## 2024-08-13 PROCEDURE — 87205 SMEAR GRAM STAIN: CPT

## 2024-08-13 PROCEDURE — 99214 OFFICE O/P EST MOD 30 MIN: CPT | Performed by: OBSTETRICS & GYNECOLOGY

## 2024-08-13 PROCEDURE — 87661 TRICHOMONAS VAGINALIS AMPLIF: CPT

## 2024-08-13 PROCEDURE — 87491 CHLMYD TRACH DNA AMP PROBE: CPT

## 2024-08-13 RX ORDER — CLOPIDOGREL BISULFATE 75 MG/1
TABLET ORAL
COMMUNITY
Start: 2024-08-11

## 2024-08-13 NOTE — PROGRESS NOTES
Chief Complaint   Patient presents with    Vaginal Bleeding     Still having bleeding, still pinkish  -- no cramping    Chaperone declined     Continues to experience pinkish discharge on a daily basis.  Status post total laparoscopic hysterectomy done in 2018.    REVIEW OF SYSTEMS    Please see HPI for reported pertinent positives, which would supersede this ROS    Constitutional:  Denies fever, chills, wt gain or loss, fatigue    Genito-Urinary:  Denies genital lesion or sores, vaginal dryness, itching  or pain.  No abnormal vaginal discharge or unexplained vaginal bleeding.  No dysuria, urinary incontinence or frequency.  Denies pelvic pain, dysmenorrhea or dyspareunia.    Eyes:  Denies vision changes, dryness  ENT:  No hearing loss, sinus pain or congestion, nosebleeds  Cardiovascular:  No chest pain or palpitations  Respiratory:  No SOB, cough, wheezing  GI:  No Nausea, vomiting, diarrhea, constipation, abdominal pain  Musculoskeletal:  No new back pain. joint pain, peripheral edema  Skin:  No rash or skin lesion  Neurologic:  No HA, numbness or dizziness  Psychiatric:  No new anxiety or depression  Endocrine:  No loss of hair or hirsutism  Hematologic/lymphatic:  No swollen lymph nodes.  No reported tendency for easy bruising or bleeding    Patient admits to no other systemic complaints      There were no vitals filed for this visit.    PHYSICAL EXAM      PSYCH:  Pt is alert, oriented and cooperative    SKIN: warm, dry, w/o lesion    HEAD AND FACE:  External inspection of eyes, ears, functional cranial nerves normal and intact    THYROID:  No thyromegaly    CARDIOVASCULAR:  Warm and well Perfused    PULMONARY:  No respiratory distress    ABDOMEN:  soft, nontender.  No mass or organomegaly.      PELVIC:    External genitalia, urethra, perianal region normal to inspection and palpation if indicated.  No inguinal LA    Vagina without lesions, visually.  To bimanual exam can feel foreign object anteriorly at 1:00  just lateral, left of the urethra.    Speculum exam reveals 7 to 8 mm erosion with apparent permanent suture material present, distal half to the left side of the urethra.    Bimanual exam:      No pelvic mass palpable    No adnexal masses or tenderness      Assessment/Plan    Diagnoses and all orders for this visit:  Irregular bleeding/vaginal discharge  Entire history and clinical exam consistent with vaginal erosion due to permanent suture material.  Will review operative note and follow-up with patient regarding next step.  Likely will need excision of this suture material.    Addendum:  Reviewed op reports and did have urethral sling simultaneous w TLH.  Disc w and refer to Urogyn, 'martin Conte and Em.  Disc w pt.

## 2024-08-14 LAB
BACTERIAL VAGINOSIS VAG-IMP: NORMAL
C TRACH RRNA SPEC QL NAA+PROBE: NEGATIVE
CLUE CELLS VAG LPF-#/AREA: NORMAL /[LPF]
N GONORRHOEA DNA SPEC QL PROBE+SIG AMP: NEGATIVE
NUGENT SCORE: 5
T VAGINALIS RRNA SPEC QL NAA+PROBE: NEGATIVE
YEAST VAG WET PREP-#/AREA: NORMAL

## 2024-08-28 ENCOUNTER — APPOINTMENT (OUTPATIENT)
Dept: OPHTHALMOLOGY | Facility: CLINIC | Age: 44
End: 2024-08-28
Payer: COMMERCIAL

## 2024-08-28 DIAGNOSIS — E11.3413 SEVERE NONPROLIFERATIVE DIABETIC RETINOPATHY OF BOTH EYES WITH MACULAR EDEMA ASSOCIATED WITH TYPE 2 DIABETES MELLITUS (MULTI): Primary | ICD-10-CM

## 2024-08-28 RX ORDER — CIPROFLOXACIN HYDROCHLORIDE 500 MG/1
1.25 TABLET ORAL
Status: COMPLETED | OUTPATIENT
Start: 2024-08-28 | End: 2024-08-28

## 2024-08-28 ASSESSMENT — VISUAL ACUITY
METHOD: SNELLEN - LINEAR
OS_PH_CC: 20/70-2
OD_CC: 20/50
CORRECTION_TYPE: GLASSES
OS_CC: 20/100

## 2024-08-28 ASSESSMENT — TONOMETRY
OD_IOP_MMHG: 15
IOP_METHOD: GOLDMANN APPLANATION
OS_IOP_MMHG: 14

## 2024-08-28 ASSESSMENT — CONF VISUAL FIELD
OS_INFERIOR_NASAL_RESTRICTION: 0
OD_INFERIOR_NASAL_RESTRICTION: 0
OD_NORMAL: 1
OD_SUPERIOR_NASAL_RESTRICTION: 0
OS_SUPERIOR_TEMPORAL_RESTRICTION: 0
OD_INFERIOR_TEMPORAL_RESTRICTION: 0
OS_INFERIOR_TEMPORAL_RESTRICTION: 0
OD_SUPERIOR_TEMPORAL_RESTRICTION: 0
OS_SUPERIOR_NASAL_RESTRICTION: 0
OS_NORMAL: 1

## 2024-08-28 ASSESSMENT — CUP TO DISC RATIO
OS_RATIO: 0.35
OD_RATIO: 0.35

## 2024-08-28 ASSESSMENT — SLIT LAMP EXAM - LIDS
COMMENTS: NORMAL
COMMENTS: NORMAL

## 2024-08-28 ASSESSMENT — EXTERNAL EXAM - LEFT EYE: OS_EXAM: NORMAL

## 2024-08-28 ASSESSMENT — EXTERNAL EXAM - RIGHT EYE: OD_EXAM: NORMAL

## 2024-08-28 NOTE — PROGRESS NOTES
#Severe nonproliferative diabetic retinopathy of right eye with macular edema associated with type 2 diabetes mellitus (CMS/HCC)  -Hx of DM 20 years  -Last A1c 13.8% 6/2023  -DME present both eyes  -Reports previous laser and eye injections about 3 years ago, unsure of provider  -Limited BCVA both eyes today, with central DME present both eyes    #PDR OS  Faint NVD   Low risk PDR    Plan  -Discussed r/b/a of treatment options, patient agree to IVTs  - PERICO # 2 was given OU  - RTC in 4 weeks    #Cataracts both eyes  -Managed by Dr. Baker

## 2024-08-30 ENCOUNTER — OFFICE VISIT (OUTPATIENT)
Dept: OBSTETRICS AND GYNECOLOGY | Facility: CLINIC | Age: 44
End: 2024-08-30
Payer: COMMERCIAL

## 2024-08-30 VITALS — BODY MASS INDEX: 31.6 KG/M2 | SYSTOLIC BLOOD PRESSURE: 146 MMHG | DIASTOLIC BLOOD PRESSURE: 89 MMHG | WEIGHT: 189.9 LBS

## 2024-08-30 DIAGNOSIS — R31.29 MICROSCOPIC HEMATURIA: ICD-10-CM

## 2024-08-30 DIAGNOSIS — Z87.891 HISTORY OF TOBACCO USE: ICD-10-CM

## 2024-08-30 DIAGNOSIS — T83.718A EROSION OF BLADDER SUSPENSION MESH, INITIAL ENCOUNTER (CMS-HCC): ICD-10-CM

## 2024-08-30 DIAGNOSIS — Z86.73 HISTORY OF STROKE: ICD-10-CM

## 2024-08-30 DIAGNOSIS — N39.3 SUI (STRESS URINARY INCONTINENCE, FEMALE): Primary | ICD-10-CM

## 2024-08-30 LAB
POC APPEARANCE, URINE: CLEAR
POC BILIRUBIN, URINE: NEGATIVE
POC BLOOD, URINE: ABNORMAL
POC COLOR, URINE: YELLOW
POC GLUCOSE, URINE: NEGATIVE MG/DL
POC KETONES, URINE: NEGATIVE MG/DL
POC LEUKOCYTES, URINE: ABNORMAL
POC NITRITE,URINE: NEGATIVE
POC PH, URINE: 5.5 PH
POC PROTEIN, URINE: ABNORMAL MG/DL
POC SPECIFIC GRAVITY, URINE: >=1.03
POC UROBILINOGEN, URINE: 0.2 EU/DL

## 2024-08-30 PROCEDURE — 81003 URINALYSIS AUTO W/O SCOPE: CPT | Performed by: OBSTETRICS & GYNECOLOGY

## 2024-08-30 PROCEDURE — 99214 OFFICE O/P EST MOD 30 MIN: CPT | Performed by: OBSTETRICS & GYNECOLOGY

## 2024-08-30 PROCEDURE — 81001 URINALYSIS AUTO W/SCOPE: CPT | Performed by: OBSTETRICS & GYNECOLOGY

## 2024-08-30 PROCEDURE — 87086 URINE CULTURE/COLONY COUNT: CPT | Performed by: OBSTETRICS & GYNECOLOGY

## 2024-08-30 NOTE — PROGRESS NOTES
[facility] Department of Urogynecology   Anamaria Strickland MD, MPH   [phone]    ASSESSMENT AND PLAN:   43 y.o. female being assessed for vaginal mesh erosion and JODY. Co morbidities: hx of ischemic stroke due to poorly controlled T2DM, HTN, CKD, former smoker.           Diagnoses:   #1 Vaginal mesh erosion  #2 JODY  #3 history of stroke    Plan:   1. Vaginal mesh erosion  - 1/16/18: Uterosacral colpopexy, Graff culdoplasty, Mid urethral sling placement Trae De La Torre, Cystoscopy, Pudendal block with Dr. Castro.   - We reviewed mesh erosion is not dangerous, but can affect quality of life and cause increased discharge. The portion of exposed mesh can be surgically removed, which is what we typically do for mesh erosion. All of the mesh could be removed if patient prefers that. Surgery takes 45 minutes to an hour.   - She would need PCP and neurology clearance before surgery. Will message neurology to discuss clearance.   - Before surgery, we will plan for a cystoscopy to rule out bladder mesh erosion.   - UDS was ordered to evaluate her bladder function.   - She will start estrogen cream transvaginally. Nightly x2 weeks, then twice weekly at night. This may help with bleeding, but is unlikely to completely heal the erosion. We reassured her that using tv estrogen cream has a localized effect to the vaginal tissue and is not a form of HRT which produces a systemic effect.    2. JODY  - Patient denies her MUS ever improved JODY. Options include another sling in a different area or Bulkamid. There is a 20-40% chance she will need repeat Bulkamid. There is virtually no recovery period after Bulkamid.   - She was given information to read on MUS and Bulkamid. Plan for further discussions after cystoscopy and UDS.      3. Hx of stroke  - has not seen neurology outpatient. Referral placed. Will need preop clearance.     Follow-up with Dr. Strickland for a cystoscopy.     Scribe Attestation:   Karla DELAROSA, am  scribing for virtually, and in the presence of Anamaria Strickland MD MPH on 24 at 4:45 PM.     Problem List Items Addressed This Visit    None  Visit Diagnoses       JODY (stress urinary incontinence, female)    -  Primary    Relevant Orders    Uroflowmetry    Urinalysis with Reflex Culture and Microscopic    Erosion of bladder suspension mesh, initial encounter (CMS-HCC)        Relevant Orders    Cystoscopy    Urinalysis with Reflex Culture and Microscopic    Microscopic hematuria        Relevant Orders    Urinalysis with Reflex Culture and Microscopic    Urinalysis with Reflex Culture and Microscopic    POCT UA Automated manually resulted           I spent a total of 45 minutes in face to face and non face to face time.     I Dr. Strickland, personally performed the services described in the documentation as scribed in my presence and confirm it is both complete and accurate.  Anamaria Strickland MD, MPH, FACOG       Anamaria Strickland MD, MPH, FACOG     New    HISTORY OF PRESENT ILLNESS:     Tracey Stephen is a 43 y.o. female who presents for vaginal mesh erosion.     Record Review:   - 24 Dr. Rogers note: Irregular bleeding/vaginal discharge - Entire history and clinical exam consistent with vaginal erosion due to permanent suture material. Likely will need excision of this suture material. S/p urethral sling simultaneous w TLH.     Prolapse Symptoms:  - Denies vaginal bulge.      Urinary Symptoms:   - She is s/p MUS in 2018 for JODY.   - Reports the sling never worked for her JODY.   - Leaks with a strong cough, laugh, and sneeze.   - Denies UUI.   - Feels she empties her bladder.   - Voids 2-3x daily.   - Denies nocturia and wakes up dry.   - Denies hx of UTIs.   - She denies frequent urine leakage.   - Goes through one pad per day.     Bowel Symptoms:   - BMs every 3-4 days, soft and easy to pass.   - Denies pushing or straining.   - Denies FI.     OBGYN History and Sexual Activity:   - ; vaginal delivery x3  -  Denies OASI.   - Her partner can feel the mesh erosion during intercourse.   - Reports she has taken abx due to vaginal discharge.   - She denies dyspareunia.   - Denies abnormal discharge currently.   - Endorses spotting daily.   - Pt has seen blood when she wipes for the past year.   - She is s/p hysterectomy.        Past Medical History:     - hx of ischemic stroke due to poorly controlled T2DM, HTN, CKD, former smoker   - she is not on blood thinners    Past Medical History:   Diagnosis Date    Acute candidiasis of vulva and vagina 06/08/2015    Vaginitis due to Candida albicans    Acute vaginitis 02/06/2015    Bacterial vaginosis    Anxiety disorder, unspecified     Anxiety    Bipolar disorder, unspecified (Multi)     Bipolar disorder    Dysmenorrhea, unspecified 01/04/2016    Menstrual cramp    Encounter for follow-up examination after completed treatment for conditions other than malignant neoplasm 01/31/2018    Postoperative examination    Encounter for other general counseling and advice on contraception 05/09/2017    Birth control counseling    Encounter for screening for malignant neoplasm of cervix 03/21/2017    Cervical cancer screening    Mastitis without abscess 02/26/2018    Cellulitis of left breast    Obesity, unspecified 02/26/2018    Class 1 obesity    Personal history of other diseases of the female genital tract 07/25/2017    History of abnormal uterine bleeding    Personal history of other endocrine, nutritional and metabolic disease 12/08/2015    History of type 2 diabetes mellitus    Personal history of other infectious and parasitic diseases 05/09/2017    History of trichomoniasis    Personal history of other specified conditions 01/31/2018    History of urinary incontinence    Personal history of other specified conditions 07/25/2017    History of urinary incontinence    Post-traumatic stress disorder, unspecified     PTSD (post-traumatic stress disorder)    Type 2 diabetes mellitus with  other diabetic neurological complication (Multi) 08/07/2018    DM (diabetes mellitus), type 2 with neurological complications    Unspecified urinary incontinence     Bladder leak          Past Surgical History:     - 1/16/18: Uterosacral colpopexy, Graff culdoplasty, Mid urethral sling placement Trae Wil, Cystoscopy, Pudendal block with Dr. Castro    - status post hysterectomy by Dr. Cabrera for fibroid uterus   Past Surgical History:   Procedure Laterality Date    BREAST SURGERY  09/02/2015    Breast Surgery Reduction Procedure    HYSTERECTOMY      MR HEAD ANGIO WO IV CONTRAST  06/15/2023    MR HEAD ANGIO WO IV CONTRAST 6/15/2023 [facility] MRI    MR NECK ANGIO WO IV CONTRAST  06/15/2023    MR NECK ANGIO WO IV CONTRAST 6/15/2023 [facility] MRI    OTHER SURGICAL HISTORY  01/31/2018    Laparoscopy With Total Hysterectomy For Uterus > 250g    OTHER SURGICAL HISTORY  01/31/2018    Oviductal Surgery Bilateral Salpingectomy    TUBAL LIGATION  09/02/2015    Tubal Ligation         Medications:     Prior to Admission medications    Medication Sig Start Date End Date Taking? Authorizing Provider   Alcohol Prep Pads pads, medicated USE TOPICALLY PRIOR TO TESTING OR INJECTING INSULIN. 3/26/24  Yes Harvinder Mendoza MD   amLODIPine (Norvasc) 10 mg tablet Take 1 tablet (10 mg) by mouth once daily. 3/26/24 3/26/25 Yes Harvinder Mendoza MD   aspirin 81 mg chewable tablet Chew 1 tablet (81 mg) once daily. TAKE 1 TABLET BY ORAL/FEEDING TUBE ROUTE ONCE DAILY. 5/8/24 5/8/25 Yes Kale Mario MD   atorvastatin (Lipitor) 80 mg tablet Take 1 tablet (80 mg) by mouth once daily at bedtime. 3/26/24 3/26/25 Yes Harvinder Mendoza MD   clopidogrel (Plavix) 75 mg tablet  8/11/24  Yes Historical Provider, MD   fluconazole (Diflucan) 150 mg tablet Take 1 tablet (150 mg) by mouth see administration instructions for 2 doses. Take one tab now and repeat in 3 days 5/21/24  Yes Alphonso Rogers MD   hydroCHLOROthiazide (HYDRODiuril) 25 mg tablet  5/15/24  Yes  "Historical Provider, MD   insulin lispro (HumaLOG) 100 unit/mL injection Inject 10 Units under the skin. 5/20/23  Yes Historical Provider, MD   Lantus U-100 Insulin 100 unit/mL injection Inject 30 Units under the skin once daily. 8/31/23  Yes Historical Provider, MD   mirtazapine (Remeron) 7.5 mg tablet  5/13/24  Yes Historical Provider, MD   OneTouch Ultra Test strip 3 each before breakfast, before lunch, before evening meal, and at bedtime. 3/26/24  Yes Harvinder Mendoza MD   pen needle, diabetic 31 gauge x 5/16\" needle Use to inject 1-4 times daily as directed. 3/26/24 3/26/25 Yes Harvinder Mendoza MD   acetaminophen (Tylenol) 325 mg tablet Take 1 tablet (325 mg) by mouth every 4 hours.  Patient not taking: Reported on 8/30/2024 7/6/23   Kecia Fernandez DO   dulaglutide (Trulicity) 1.5 mg/0.5 mL pen injector injection Inject 1.5 mg under the skin 1 (one) time per week.  Patient not taking: Reported on 8/30/2024 3/26/24   Harvinder Mendoza MD   loratadine (Claritin) 10 mg tablet Take 1 tablet (10 mg) by mouth once daily. 8/31/23 2/27/24  DO KHUSHBU Baltazar  Review of Systems       PHYSICAL EXAM:    /89   Wt 86.1 kg (189 lb 14.4 oz)   BMI 31.60 kg/m²   No LMP recorded. Patient has had a hysterectomy.    Declines chaperone for physical exam.      Well developed, well nourished, in no apparent distress.   Neurologic/Psychiatric:  Awake, Alert and Oriented times 3.  Affect normal.     GENITAL/URINARY:     External Genitalia:  The patient has normal appearing external genitalia, normal skenes and bartholins glands, and a normal hair distribution.  Her vulva is without lesions, erythema or discharge.  It is non-tender with appropriate sensation.     Urethral Meatus:  Size normal, Location normal, Lesions absent, Prolapse absent.    Urethra:  Fullness absent, Masses absent.    Bladder:  Fullness absent, Masses absent, Tenderness absent.    Vagina:  General appearance normal, Discharge absent, Lesions absent. 1.5 " cm mesh erosion on L anterior sulcus and no mesh palpated on R sulcus. No pain over mesh.     Cervix:  surgically absent   Uterus:  surgically absent    Anus/Perineum:  Lesions absent and Masses absent defer exam  Normal Perineum        Data and DIAGNOSTIC STUDIES REVIEWED   Intravitreal Injection, Pharmacologic Agent - OU - Both Eyes    Result Date: 8/28/2024  Time Out 8/28/2024. 11:47 AM. Confirmed correct patient, procedure, site, and patient consented. Anesthesia Right Eye Subconjunctival anesthesia was used. Anesthetic medications included Lidocaine 2%. Left Eye Subconjunctival anesthesia was used. Anesthetic medications included Lidocaine 2%. Procedure Right Eye Preparation included 5% betadine to ocular surface. A 30 gauge needle was used. Injection: 1.25 mg bevacizumab 1.25 mg/0.05 mL   Route: intravitreal, Site: Right Eye   ND: 50428-649-65, Lot: [id number] Left Eye Preparation included 5% betadine to ocular surface. A 30 gauge needle was used. Injection: 1.25 mg bevacizumab 1.25 mg/0.05 mL   Route: intravitreal, Site: Left Eye   NDC: 97909-231-82, Lot: [id number] Post-op Right Eye Post injection exam found visual acuity of at least counting fingers. The patient tolerated the procedure well. There were no complications. The patient received written and verbal post procedure care education. Post injection medications were not given. Left Eye Post injection exam found visual acuity of at least counting fingers. The patient tolerated the procedure well. There were no complications. The patient received written and verbal post procedure care education. Post injection medications were not given. Notes INTRAVITREAL INJECTION DATE OF Procedure: 8/28/2024 SURGEON: Khoi Ling MD PhD - The risks and benefits of intravitreal injection were discussed including vision loss, elevated intraocular pressure, conjunctival hemorrhage, vitreous hemorrhage, retinal tear/detachment, intraocular inflammation,  endophthalmitis, post-procedural discomfort, and need for further injections or other surgical intervention. The patient acknowleged these risks and agreed to proceed with treatment; informed consent was obtained. INDICATION Diabetic Macular Edema Eye: Both eyes MEDICATION: Avastin ANESTHESIA Two drops of topical proparacaine followed by one drop of topical povidone Iodine 5% were instilled onto the ocular surface 5 minutes apart. Then, subconjunctival lidocaine 2% injection was given. PROCEDURE One drop of topical povidone Iodine 5% was instilled in the inferotemporal quadrant of the right eye. Intravitreal injection of 0.5cc of Avastin was given 4.0mm from the limbus in the inferotemporal quadrant. A drop of dilute povidone-iodine solution was in instilled over the injection site.  The ocular surface was flushed to remove any residual povidone-iodone solution. The patient tolerated the procedure well.  The same procedure were performed in the left eye and was tolerated well COMPLICATIONS None      OCT, Retina - OU - Both Eyes    Result Date: 8/28/2024  Retinal multimodal imaging including photography was completed, and the findings are described in the examination.     Lab Results   Component Value Date    URINECULTURE Normal genitourinary michael 05/03/2024      Lab Results   Component Value Date    GLUCOSE 143 (H) 05/13/2024    CALCIUM 7.8 (L) 05/13/2024     05/13/2024    K 3.7 05/13/2024    CO2 20 (L) 05/13/2024     05/13/2024    BUN 32 (H) 05/13/2024    CREATININE 2.24 (H) 05/13/2024     Lab Results   Component Value Date    WBC 8.0 05/13/2024    HGB 12.4 05/13/2024    HCT 36.9 05/13/2024    MCV 89 05/13/2024     05/13/2024

## 2024-08-31 LAB
APPEARANCE UR: CLEAR
BILIRUB UR STRIP.AUTO-MCNC: NEGATIVE MG/DL
COLOR UR: ABNORMAL
GLUCOSE UR STRIP.AUTO-MCNC: NORMAL MG/DL
HOLD SPECIMEN: NORMAL
HYALINE CASTS #/AREA URNS AUTO: ABNORMAL /LPF
KETONES UR STRIP.AUTO-MCNC: NEGATIVE MG/DL
LEUKOCYTE ESTERASE UR QL STRIP.AUTO: ABNORMAL
MUCOUS THREADS #/AREA URNS AUTO: ABNORMAL /LPF
NITRITE UR QL STRIP.AUTO: NEGATIVE
PH UR STRIP.AUTO: 6 [PH]
PROT UR STRIP.AUTO-MCNC: ABNORMAL MG/DL
RBC # UR STRIP.AUTO: ABNORMAL /UL
RBC #/AREA URNS AUTO: ABNORMAL /HPF
SP GR UR STRIP.AUTO: 1.02
SQUAMOUS #/AREA URNS AUTO: ABNORMAL /HPF
UROBILINOGEN UR STRIP.AUTO-MCNC: NORMAL MG/DL
WBC #/AREA URNS AUTO: ABNORMAL /HPF

## 2024-09-01 LAB — BACTERIA UR CULT: NORMAL

## 2024-09-09 ENCOUNTER — TELEPHONE (OUTPATIENT)
Dept: PRIMARY CARE | Facility: CLINIC | Age: 44
End: 2024-09-09
Payer: COMMERCIAL

## 2024-09-09 NOTE — TELEPHONE ENCOUNTER
Patient called in requesting a form to be filled out for her lights. She stated that Dr. Fernandez already has the form. The form should be sent to [phone]. Please advise. Thanks!

## 2024-09-19 ENCOUNTER — TELEMEDICINE (OUTPATIENT)
Dept: NEUROLOGY | Facility: HOSPITAL | Age: 44
End: 2024-09-19
Payer: COMMERCIAL

## 2024-09-19 DIAGNOSIS — Z86.73 HISTORY OF STROKE: ICD-10-CM

## 2024-09-19 DIAGNOSIS — I10 PRIMARY HYPERTENSION: Primary | ICD-10-CM

## 2024-09-19 PROCEDURE — 3049F LDL-C 100-129 MG/DL: CPT | Performed by: STUDENT IN AN ORGANIZED HEALTH CARE EDUCATION/TRAINING PROGRAM

## 2024-09-19 PROCEDURE — 99215 OFFICE O/P EST HI 40 MIN: CPT | Mod: GT,U1 | Performed by: STUDENT IN AN ORGANIZED HEALTH CARE EDUCATION/TRAINING PROGRAM

## 2024-09-19 PROCEDURE — 3046F HEMOGLOBIN A1C LEVEL >9.0%: CPT | Performed by: STUDENT IN AN ORGANIZED HEALTH CARE EDUCATION/TRAINING PROGRAM

## 2024-09-19 PROCEDURE — 99215 OFFICE O/P EST HI 40 MIN: CPT | Performed by: STUDENT IN AN ORGANIZED HEALTH CARE EDUCATION/TRAINING PROGRAM

## 2024-09-24 ENCOUNTER — PROCEDURE VISIT (OUTPATIENT)
Dept: OBSTETRICS AND GYNECOLOGY | Facility: CLINIC | Age: 44
End: 2024-09-24
Payer: COMMERCIAL

## 2024-09-24 DIAGNOSIS — N39.3 SUI (STRESS URINARY INCONTINENCE, FEMALE): ICD-10-CM

## 2024-09-24 LAB
POC APPEARANCE, URINE: CLEAR
POC BILIRUBIN, URINE: ABNORMAL
POC BLOOD, URINE: ABNORMAL
POC COLOR, URINE: YELLOW
POC GLUCOSE, URINE: NEGATIVE MG/DL
POC KETONES, URINE: NEGATIVE MG/DL
POC LEUKOCYTES, URINE: NEGATIVE
POC NITRITE,URINE: NEGATIVE
POC PH, URINE: 5.5 PH
POC PROTEIN, URINE: ABNORMAL MG/DL
POC SPECIFIC GRAVITY, URINE: >=1.03
POC UROBILINOGEN, URINE: 0.2 EU/DL

## 2024-09-24 PROCEDURE — 51797 INTRAABDOMINAL PRESSURE TEST: CPT | Performed by: OBSTETRICS & GYNECOLOGY

## 2024-09-24 PROCEDURE — 51729 CYSTOMETROGRAM W/VP&UP: CPT | Performed by: OBSTETRICS & GYNECOLOGY

## 2024-09-24 PROCEDURE — 81003 URINALYSIS AUTO W/O SCOPE: CPT

## 2024-09-24 PROCEDURE — 81003 URINALYSIS AUTO W/O SCOPE: CPT | Mod: QW | Performed by: OBSTETRICS & GYNECOLOGY

## 2024-09-24 PROCEDURE — 51784 ANAL/URINARY MUSCLE STUDY: CPT | Performed by: OBSTETRICS & GYNECOLOGY

## 2024-09-24 NOTE — PROGRESS NOTES
Patient ID: Tracey Stephen is a 44 y.o. female.    Uroflowmetry    Date/Time: 9/24/2024 10:08 AM    Performed by: Cami Valdez LPN  Authorized by: Anamaria Strickland MD MPH    Procedure discussed: discussed risks, benefits and alternatives    Chaperone present: no    Timeout: timeout called immediately prior to procedure    Position: sitting    Procedure Details     Procedure: CMG with UPP/voiding pressures, EMG and intra-abdominal voiding study      CMG with UPP/Voiding Pressures Details:     First urge to void (mL): 183    Urgency to void (mL): 202    Bladder capacity (mL): 273    Intra-abdominal Voiding Study Details:     Rectal catheter placed to record intra-abdominal pressure: yes      Post-Procedure Details     Outcome: patient tolerated procedure well with no complications      Additional Details      No uroflow, pt cathed for 75 ml. Stress incontinence+.          Urodynamics Evaluation Documentation      HPI/Indication for UDS: 43 y.o. female being assessed for vaginal mesh erosion and JODY. Co morbidities: hx of ischemic stroke due to poorly controlled T2DM, HTN, CKD, former smoker.           Diagnoses:   #1 Vaginal mesh erosion  #2 JODY  #3 history of stroke     Plan:   1. Vaginal mesh erosion  - 1/16/18: Uterosacral colpopexy, Graff culdoplasty, Mid urethral sling placement Trae De La Torre, Cystoscopy, Pudendal block with Dr. Castro.   - We reviewed mesh erosion is not dangerous, but can affect quality of life and cause increased discharge. The portion of exposed mesh can be surgically removed, which is what we typically do for mesh erosion. All of the mesh could be removed if patient prefers that. Surgery takes 45 minutes to an hour.   - She would need PCP and neurology clearance before surgery. Will message neurology to discuss clearance.   - Before surgery, we will plan for a cystoscopy to rule out bladder mesh erosion.   - UDS was ordered to evaluate her bladder function.   - She will start estrogen  cream transvaginally. Nightly x2 weeks, then twice weekly at night. This may help with bleeding, but is unlikely to completely heal the erosion. We reassured her that using tv estrogen cream has a localized effect to the vaginal tissue and is not a form of HRT which produces a systemic effect.     2. JODY  - Patient denies her MUS ever improved JODY. Options include another sling in a different area or Bulkamid. There is a 20-40% chance she will need repeat Bulkamid.   - She was given information to read on MUS and Bulkamid. Plan for further discussions after cystoscopy and UDS.      3. Hx of stroke  - has not seen neurology outpatient. Referral placed. Will need preop clearance.     Summary  Uroflow: not completed, PVR 75  Cmg: normal capacity, compliance, sensation. 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 Will need additional anti-incontinence procedure at the time of mesh excision

## 2024-09-26 ENCOUNTER — PREP FOR PROCEDURE (OUTPATIENT)
Dept: OBSTETRICS AND GYNECOLOGY | Facility: CLINIC | Age: 44
End: 2024-09-26
Payer: COMMERCIAL

## 2024-09-26 ENCOUNTER — PROCEDURE VISIT (OUTPATIENT)
Dept: OBSTETRICS AND GYNECOLOGY | Facility: CLINIC | Age: 44
End: 2024-09-26
Payer: COMMERCIAL

## 2024-09-26 VITALS — SYSTOLIC BLOOD PRESSURE: 149 MMHG | WEIGHT: 190 LBS | DIASTOLIC BLOOD PRESSURE: 92 MMHG | BODY MASS INDEX: 31.62 KG/M2

## 2024-09-26 DIAGNOSIS — N39.3 SUI (STRESS URINARY INCONTINENCE, FEMALE): Primary | ICD-10-CM

## 2024-09-26 DIAGNOSIS — T83.718A EROSION OF BLADDER SUSPENSION MESH, INITIAL ENCOUNTER (CMS-HCC): ICD-10-CM

## 2024-09-26 DIAGNOSIS — B37.2 YEAST INFECTION OF THE SKIN: ICD-10-CM

## 2024-09-26 DIAGNOSIS — T83.718S EROSION OF BLADDER SUSPENSION MESH, SEQUELA: Primary | ICD-10-CM

## 2024-09-26 DIAGNOSIS — Z01.818 PREOP TESTING: ICD-10-CM

## 2024-09-26 DIAGNOSIS — N39.3 SUI (STRESS URINARY INCONTINENCE, FEMALE): ICD-10-CM

## 2024-09-26 LAB
POC APPEARANCE, URINE: CLEAR
POC BILIRUBIN, URINE: NEGATIVE
POC BLOOD, URINE: ABNORMAL
POC COLOR, URINE: YELLOW
POC GLUCOSE, URINE: NEGATIVE MG/DL
POC KETONES, URINE: NEGATIVE MG/DL
POC LEUKOCYTES, URINE: NEGATIVE
POC NITRITE,URINE: NEGATIVE
POC PH, URINE: 5.5 PH
POC PROTEIN, URINE: ABNORMAL MG/DL
POC SPECIFIC GRAVITY, URINE: >=1.03
POC UROBILINOGEN, URINE: 0.2 EU/DL

## 2024-09-26 PROCEDURE — 52000 CYSTOURETHROSCOPY: CPT | Performed by: OBSTETRICS & GYNECOLOGY

## 2024-09-26 PROCEDURE — 81003 URINALYSIS AUTO W/O SCOPE: CPT | Mod: QW | Performed by: OBSTETRICS & GYNECOLOGY

## 2024-09-26 PROCEDURE — 2500000005 HC RX 250 GENERAL PHARMACY W/O HCPCS: Performed by: OBSTETRICS & GYNECOLOGY

## 2024-09-26 PROCEDURE — 2500000002 HC RX 250 W HCPCS SELF ADMINISTERED DRUGS (ALT 637 FOR MEDICARE OP, ALT 636 FOR OP/ED): Performed by: OBSTETRICS & GYNECOLOGY

## 2024-09-26 RX ORDER — METFORMIN HYDROCHLORIDE 500 MG/1
500 TABLET ORAL
COMMUNITY
Start: 2024-07-02

## 2024-09-26 RX ORDER — FLUCONAZOLE 150 MG/1
150 TABLET ORAL ONCE
Qty: 1 TABLET | Refills: 0 | Status: SHIPPED | OUTPATIENT
Start: 2024-09-26 | End: 2024-09-26

## 2024-09-26 RX ORDER — NITROFURANTOIN 25; 75 MG/1; MG/1
100 CAPSULE ORAL ONCE
Status: COMPLETED | OUTPATIENT
Start: 2024-09-26 | End: 2024-09-26

## 2024-09-26 RX ORDER — LIRAGLUTIDE 6 MG/ML
0.6 INJECTION SUBCUTANEOUS DAILY
COMMUNITY
Start: 2024-09-06

## 2024-09-26 RX ORDER — PHENAZOPYRIDINE HYDROCHLORIDE 200 MG/1
200 TABLET, FILM COATED ORAL ONCE
OUTPATIENT
Start: 2024-09-26 | End: 2024-09-26

## 2024-09-26 RX ORDER — CEFAZOLIN SODIUM 2 G/100ML
2 INJECTION, SOLUTION INTRAVENOUS ONCE
OUTPATIENT
Start: 2024-09-26 | End: 2024-09-26

## 2024-09-26 RX ORDER — LIDOCAINE HYDROCHLORIDE 20 MG/ML
1 JELLY TOPICAL ONCE
Status: COMPLETED | OUTPATIENT
Start: 2024-09-26 | End: 2024-09-26

## 2024-09-26 RX ORDER — SODIUM CHLORIDE, SODIUM LACTATE, POTASSIUM CHLORIDE, CALCIUM CHLORIDE 600; 310; 30; 20 MG/100ML; MG/100ML; MG/100ML; MG/100ML
20 INJECTION, SOLUTION INTRAVENOUS CONTINUOUS
OUTPATIENT
Start: 2024-09-26

## 2024-09-26 RX ORDER — CELECOXIB 400 MG/1
400 CAPSULE ORAL ONCE
OUTPATIENT
Start: 2024-09-26 | End: 2024-09-26

## 2024-09-26 RX ORDER — ACETAMINOPHEN 325 MG/1
975 TABLET ORAL ONCE
OUTPATIENT
Start: 2024-09-26 | End: 2024-09-26

## 2024-09-26 ASSESSMENT — ENCOUNTER SYMPTOMS
GASTROINTESTINAL NEGATIVE: 1
EYES NEGATIVE: 1
ENDOCRINE NEGATIVE: 1
CONSTITUTIONAL NEGATIVE: 1
PSYCHIATRIC NEGATIVE: 1
RESPIRATORY NEGATIVE: 1
MUSCULOSKELETAL NEGATIVE: 1
NEUROLOGICAL NEGATIVE: 1
CARDIOVASCULAR NEGATIVE: 1

## 2024-09-26 ASSESSMENT — PAIN SCALES - GENERAL: PAINLEVEL: 0-NO PAIN

## 2024-09-26 NOTE — PROGRESS NOTES
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 Comorbidities include: Hx of ischemic stroke due to poorly controlled T2DM, HTN, CKD, and former smoker.           Diagnoses:   #1 Vaginal mesh erosion  #2 Stress urinary incontinence   #3 History of stroke     Plan:   1. Vaginal mesh erosion, JODY, hx of stroke  - 1/16/2018 surgery: Uterosacral colpopexy, Graff culdoplasty, midurethral sling placement Destati Wli, cystoscopy, and pudendal nerve block with Dr. Mahin Castro.   - There was mesh erosion upon exam at last visit on 8/30/2024 and we are proceeding with a cystoscopy today to evaluate if any mesh from prior MUS has eroded into the bladder.   - We discussed the risks of the cystoscopy including UTI, dysuria, and hematuria. If she feels symptomatic of a UTI we advised her to call our office. There is a very small risk of injury to the bladder or urethra due to the cystoscope. We counseled that is a risk that there could be abnormal cells that are not visible to the naked eye that we are unable to detect by cystoscopy today. If we find something abnormal or suspicious we may take a biopsy of the mass or fluid and we would refer her to urology. We will give her antibiotics for UTI ppx after the procedure today. The patient provided verbal understanding and signed her consent in the [employer] system prior to the procedure today.   - Cystoscopy today demonstrated a normal bladder neck upon retroflexion, normal urethra, and bilateral orthotopic ureters. No evidence of bladder polyps, stones, or masses were visualized. There are some areas with petechiae. No mesh erosion from midurethral sling visualized inside the bladder.   - We reviewed the results from UDS on 9/25/2024 that demonstrated normal capacity, compliance, and sensation. Empties completely. + JODY and  no DO. No evidence of MCLAUGHLIN.   - Reviewed our surgical plan to remove the midurethral sling and options to manage JODY at the time of her sling excision procedure such as placing a new midurethral sling or Bulkamid.    > Gave her PI handouts on the midurethral sling and Bulkamid to review and consider.   - Patient will need PAT and recently received neurology clearance due to her hx of stroke.   - OR case request sent for excision of midurethral sling, placement of new midurethral sling, and cystoscopy with Dr. Anamaria Strickland @ [facility] [place].     Follow up in 1-2 weeks for a virtual visit with Dr. Anamaria Strickland to finalize surgical plan as she is considering placing a new midurethral sling vs. Bulkamid.     Scribe Attestation  By signing my name below, I, Cali Castano, Max, attest that this documentation has been prepared under the direction and in the presence of Anamaria Strickland MD MPH on 09/26/2024 at 4:40 PM.     Problem List Items Addressed This Visit    None  Visit Diagnoses       Erosion of bladder suspension mesh, initial encounter ([facility])        Relevant Medications    nitrofurantoin (macrocrystal-monohydrate) (Macrobid) capsule 100 mg (Completed)    lidocaine 2 % mucosal jelly (Uro-Jet) 1 Application (Completed)    Other Relevant Orders    POCT UA Automated manually resulted (Completed)    Yeast infection of the skin        Relevant Medications    fluconazole (Diflucan) 150 mg tablet           I Dr. Strickland, personally performed the services described in the documentation as scribed in my presence and confirm it is both complete and accurate.  Anamaria Strickland MD, MPH, FACOG       Anamaria Strickland MD, MPH, FACOG     Established    HISTORY OF PRESENT ILLNESS:   44 year old female presenting for a cystoscopy to evaluate mesh erosion from prior MUS in 2018.     Record Review:   - 9/25/2024 UDS Summary: Uroflow: not completed, PVR 75. Cmg: normal capacity, compliance, sensation. + JODY, no DO. Pressure-flow: slightly  decreased flow, bell shaped, voids via detrusor mechanism, no MCLAUGHLIN, empties completely PVR 73. Dx: recurrent JODY in the setting of prior sling with mesh erosion. Will need additional anti-incontinence procedure at the time of mesh excision  - 8/30/2024 Dr. Anamaria Strickland note RE: Hx of MUS and mesh erosion from this TVT that was placed in 2018 - Plan for cystoscopy to evaluate if mesh has eroded into the bladder.   - 8/13/2024 Dr. Rogers note: Irregular bleeding/vaginal discharge - Entire history and clinical exam consistent with vaginal erosion due to permanent suture material. She will likely need excision of this suture material. S/p urethral sling simultaneous with TLH.     Urinary Symptoms:  - Denies any recent UTI.        Past Medical History:     Past Medical History:   Diagnosis Date    Acute candidiasis of vulva and vagina 06/08/2015    Vaginitis due to Candida albicans    Acute vaginitis 02/06/2015    Bacterial vaginosis    Anxiety disorder, unspecified     Anxiety    Bipolar disorder, unspecified (Multi)     Bipolar disorder    Dysmenorrhea, unspecified 01/04/2016    Menstrual cramp    Encounter for follow-up examination after completed treatment for conditions other than malignant neoplasm 01/31/2018    Postoperative examination    Encounter for other general counseling and advice on contraception 05/09/2017    Birth control counseling    Encounter for screening for malignant neoplasm of cervix 03/21/2017    Cervical cancer screening    Mastitis without abscess 02/26/2018    Cellulitis of left breast    Obesity, unspecified 02/26/2018    Class 1 obesity    Personal history of other diseases of the female genital tract 07/25/2017    History of abnormal uterine bleeding    Personal history of other endocrine, nutritional and metabolic disease 12/08/2015    History of type 2 diabetes mellitus    Personal history of other infectious and parasitic diseases 05/09/2017    History of trichomoniasis    Personal  history of other specified conditions 01/31/2018    History of urinary incontinence    Personal history of other specified conditions 07/25/2017    History of urinary incontinence    Post-traumatic stress disorder, unspecified     PTSD (post-traumatic stress disorder)    Type 2 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 (Multi) 08/07/2018    DM (diabetes mellitus), type 2 with neurological complications    Unspecified urinary incontinence     Bladder leak       Past Surgical History:     Past Surgical History:   Procedure Laterality Date    BREAST SURGERY  09/02/2015    Breast Surgery Reduction Procedure    HYSTERECTOMY      MR HEAD ANGIO WO IV CONTRAST  06/15/2023    MR HEAD ANGIO WO IV CONTRAST 6/15/2023 [facility]    MR NECK ANGIO WO IV CONTRAST  06/15/2023    MR NECK ANGIO WO IV CONTRAST 6/15/2023 [facility] MRI    OTHER SURGICAL HISTORY  01/31/2018    Laparoscopy With Total Hysterectomy For Uterus > 250g    OTHER SURGICAL HISTORY  01/31/2018    Oviductal Surgery Bilateral Salpingectomy    TUBAL LIGATION  09/02/2015    Tubal Ligation       Medications:     Prior to Admission medications    Medication Sig Start Date End Date Taking? Authorizing Provider   metFORMIN (Glucophage) 500 mg tablet Take 1 tablet (500 mg) by mouth 2 times daily (morning and late afternoon). 7/2/24  Yes Historical Provider, MD   Victoza 2-Tate 0.6 mg/0.1 mL (18 mg/3 mL) injection Inject 0.1 mL (0.6 mg) under the skin once daily. 9/6/24  Yes Historical Provider, MD   acetaminophen (Tylenol) 325 mg tablet Take 1 tablet (325 mg) by mouth every 4 hours.  Patient not taking: Reported on 8/30/2024 7/6/23   Kecia Fernandez, DO   Alcohol Prep Pads pads, medicated USE TOPICALLY PRIOR TO TESTING OR INJECTING INSULIN. 3/26/24   Harvinder Mendoza MD   amLODIPine (Norvasc) 10 mg tablet Take 1 tablet (10 mg) by mouth once daily. 3/26/24 3/26/25  Harvinder Mendoza MD   aspirin 81 mg chewable tablet Chew 1 tablet (81 mg) once daily. TAKE 1 TABLET BY  "ORAL/FEEDING TUBE ROUTE ONCE DAILY. 5/8/24 5/8/25  Kale Mario MD   atorvastatin (Lipitor) 80 mg tablet Take 1 tablet (80 mg) by mouth once daily at bedtime. 3/26/24 3/26/25  Harvinder Mendoza MD   fluconazole (Diflucan) 150 mg tablet Take 1 tablet (150 mg) by mouth 1 time for 1 dose. 1 tab today and then repeat in 3 days 9/26/24 9/26/24  Anamaria Strickland MD MPH   hydroCHLOROthiazide (HYDRODiuril) 25 mg tablet  5/15/24   Historical Provider, MD   insulin lispro (HumaLOG) 100 unit/mL injection Inject 10 Units under the skin. 5/20/23   Historical Provider, MD   Lantus U-100 Insulin 100 unit/mL injection Inject 30 Units under the skin once daily. 8/31/23   Historical Provider, MD   loratadine (Claritin) 10 mg tablet Take 1 tablet (10 mg) by mouth once daily. 8/31/23 2/27/24  Kecia Fernandez,    mirtazapine (Remeron) 7.5 mg tablet  5/13/24   Historical Provider, MD   OneTouch Ultra Test strip 3 each before breakfast, before lunch, before evening meal, and at bedtime. 3/26/24   Harvinder Mendoza MD   pen needle, diabetic 31 gauge x 5/16\" needle Use to inject 1-4 times daily as directed. 3/26/24 3/26/25  Harvinder Mendoza MD   dulaglutide (Trulicity) 1.5 mg/0.5 mL pen injector injection Inject 1.5 mg under the skin 1 (one) time per week.  Patient not taking: Reported on 8/30/2024 3/26/24 9/26/24  Harvinder Mendoza MD   fluconazole (Diflucan) 150 mg tablet Take 1 tablet (150 mg) by mouth see administration instructions for 2 doses. Take one tab now and repeat in 3 days 5/21/24 9/26/24  Alphonso Rogers MD        ROS  Review of Systems   Constitutional: Negative.    HENT: Negative.     Eyes: Negative.    Respiratory: Negative.     Cardiovascular: Negative.    Gastrointestinal: Negative.    Endocrine: Negative.    Genitourinary:  Positive for enuresis.   Musculoskeletal: Negative.    Neurological: Negative.    Psychiatric/Behavioral: Negative.            PHYSICAL EXAM:    BP (!) 149/92   Wt 86.2 kg (190 lb)   BMI 31.62 kg/m²   No " LMP recorded. Patient has had a hysterectomy.    Declines chaperone for physical exam.      Well developed, well nourished, in no apparent distress.   Neurologic/Psychiatric:  Awake, Alert and Oriented times 3.  Affect normal.     GENITAL/URINARY:     External Genitalia:  The patient has normal appearing external genitalia, normal skenes and bartholins glands, and a normal hair distribution.  Her vulva is without lesions, erythema or discharge.  It is non-tender with appropriate sensation.     Urethral Meatus:  Size normal, Location normal, Lesions absent, Prolapse absent.    Urethra:  Fullness absent, Masses absent.    Bladder:  Fullness absent, Masses absent, Tenderness absent.      Procedure Note:   Patient ID: Tracey Stephen is a 44 y.o. female.    Cystoscopy with Biospy Injection    Date/Time: 9/26/2024 4:50 PM    Performed by: Anamaria Strickland MD MPH  Authorized by: Anamaria Strickland MD MPH    Procedure discussed: discussed risks, benefits and alternatives    Chaperone present: no    Prep: patient was prepped and draped in usual sterile fashion    Anesthesia: local anesthesia      Procedure Details     Cystoscope type: flexible    Cystoscopy route: transurethral      Irrigation used: saline      Position: dorsal lithotomy    Urethra     Urethra: normal      Vagina     Vagina: normal      Bladder     Bladder: normal      Bladder comment: There are some areas with petechiae. Orthotopic ureters with bilateral efflux visualized. No mesh erosion from midurethral sling visualized inside the bladder.      Post-Procedure Details     Catheter placed: no      Appearance of urine after procedure: clear    Outcome: patient tolerated procedure well with no complications      Disposition: discharged home in satisfactory condition            Data and DIAGNOSTIC STUDIES REVIEWED   Intravitreal Injection, Pharmacologic Agent - OU - Both Eyes    Result Date: 8/28/2024  Time Out 8/28/2024. 11:47 AM. Confirmed correct patient,  procedure, site, and patient consented. Anesthesia Right Eye Subconjunctival anesthesia was used. Anesthetic medications included Lidocaine 2%. Left Eye Subconjunctival anesthesia was used. Anesthetic medications included Lidocaine 2%. Procedure Right Eye Preparation included 5% betadine to ocular surface. A 30 gauge needle was used. Injection: 1.25 mg bevacizumab 1.25 mg/0.05 mL   Route: intravitreal, Site: Right Eye   NDC: 42234-269-29, Lot: [id number] Left Eye Preparation included 5% betadine to ocular surface. A 30 gauge needle was used. Injection: 1.25 mg bevacizumab 1.25 mg/0.05 mL   Route: intravitreal, Site: Left Eye   NDC: 26097-743-43, Lot: [id number] Post-op Right Eye Post injection exam found visual acuity of at least counting fingers. The patient tolerated the procedure well. There were no complications. The patient received written and verbal post procedure care education. Post injection medications were not given. Left Eye Post injection exam found visual acuity of at least counting fingers. The patient tolerated the procedure well. There were no complications. The patient received written and verbal post procedure care education. Post injection medications were not given. Notes INTRAVITREAL INJECTION DATE OF Procedure: 8/28/2024 SURGEON: Khoi Ling MD PhD - The risks and benefits of intravitreal injection were discussed including vision loss, elevated intraocular pressure, conjunctival hemorrhage, vitreous hemorrhage, retinal tear/detachment, intraocular inflammation, endophthalmitis, post-procedural discomfort, and need for further injections or other surgical intervention. The patient acknowleged these risks and agreed to proceed with treatment; informed consent was obtained. INDICATION Diabetic Macular Edema Eye: Both eyes MEDICATION: Avastin ANESTHESIA Two drops of topical proparacaine followed by one drop of topical povidone Iodine 5% were instilled onto the ocular surface 5  minutes apart. Then, subconjunctival lidocaine 2% injection was given. PROCEDURE One drop of topical povidone Iodine 5% was instilled in the inferotemporal quadrant of the right eye. Intravitreal injection of 0.5cc of Avastin was given 4.0mm from the limbus in the inferotemporal quadrant. A drop of dilute povidone-iodine solution was in instilled over the injection site.  The ocular surface was flushed to remove any residual povidone-iodone solution. The patient tolerated the procedure well.  The same procedure were performed in the left eye and was tolerated well COMPLICATIONS None      OCT, Retina - OU - Both Eyes    Result Date: 8/28/2024  Retinal multimodal imaging including photography was completed, and the findings are described in the examination.     Lab Results   Component Value Date    URINECULTURE Normal genitourinary michael 08/30/2024      Lab Results   Component Value Date    GLUCOSE 143 (H) 05/13/2024    CALCIUM 7.8 (L) 05/13/2024     05/13/2024    K 3.7 05/13/2024    CO2 20 (L) 05/13/2024     05/13/2024    BUN 32 (H) 05/13/2024    CREATININE 2.24 (H) 05/13/2024     Lab Results   Component Value Date    WBC 8.0 05/13/2024    HGB 12.4 05/13/2024    HCT 36.9 05/13/2024    MCV 89 05/13/2024     05/13/2024

## 2024-09-27 ENCOUNTER — TELEPHONE (OUTPATIENT)
Dept: OBSTETRICS AND GYNECOLOGY | Facility: CLINIC | Age: 44
End: 2024-09-27
Payer: COMMERCIAL

## 2024-09-27 PROBLEM — T83.718A EROSION OF BLADDER SUSPENSION MESH (CMS-HCC): Status: ACTIVE | Noted: 2024-09-26

## 2024-09-27 PROBLEM — N39.3 SUI (STRESS URINARY INCONTINENCE, FEMALE): Status: ACTIVE | Noted: 2024-09-26

## 2024-09-27 NOTE — TELEPHONE ENCOUNTER
Call to patient to schedule surgery with Dr Strickland at [facility]. Procedure is removal or revision of mid-urethral sling, and/ or insertion of new mid-urethral sling and cystoscopy. Patient agrees with date of 11/5/2024. CPM requested.

## 2024-09-29 PROBLEM — I10 HYPERTENSION: Status: ACTIVE | Noted: 2024-09-29

## 2024-09-29 PROBLEM — E11.42 DIABETIC POLYNEUROPATHY ASSOCIATED WITH TYPE 2 DIABETES MELLITUS (MULTI): Status: ACTIVE | Noted: 2024-09-29

## 2024-09-29 PROBLEM — Z86.73 HISTORY OF STROKE: Status: ACTIVE | Noted: 2024-09-29

## 2024-09-30 DIAGNOSIS — Z01.818 PREOP TESTING: Primary | ICD-10-CM

## 2024-09-30 NOTE — PROGRESS NOTES
[facility]   Tracey Stephen is a 44 y.o. year old female presenting for follow-up .   Referred by: Anamaria Strickland MD MPH  PCP: Harvinder Mendoza MD    Pt with multiple vascular risk factors including HTB, DM2, prior stroke was hospitalized with L PLIC stroke earlier this year and initiated on DAPT. Found to be poorly responsive to plavix, has continued antiplatelets and statin without additional episodes. Patient is undergoing planning for a gynecological procedure and requires neurological clearance.     Extensive review of notes in EMR, labs, tests, Interpretation of neuroimaging  No CT head results found for the past 12 months  MR brain w and wo IV contrast    Result Date: 5/4/2024  MRI Brain: 1. Foci of abnormal diffusion restriction within the posterior limb of the left internal capsule and the white matter of the left subinsular region with mild associated FLAIR hyperintense edema but no postcontrast enhancement are favored to represent acute to early subacute infarcts. 2. Additional focus of diffusion restriction with some postcontrast enhancement is present within the periventricular white matter of the left atrium, posterolateral to the left thalamus, possibly representing a subacute infarct, although some most suspicious for a demyelinating plaque. Multiple sclerosis workup is recommended. 3. Area of diffusion restriction described on previous MRI in June of 2023 in the posterior body of the right corpus callosum demonstrates faint residual hyperintense DWI signal with interval volume loss since prior study, although there is some faint enhancement present on postcontrast images, suspicious for demyelinating plaque. 4. Similar appearance of numerous supratentorial and brainstem lesions some of which are oriented perpendicular to the lateral ventricles suggestive of a demyelinating process such as multiple sclerosis. 5. A 0.5 cm focus of enhancement within the left  caudate nucleus without associated diffusion restriction or signal abnormality. Finding is indeterminate and may be vascular in etiology.   MRA: 1. Unchanged segmental decreased flow in the left P2 segment of the PCA corresponding to the similar location visualized on prior CT angio head corresponding to focal moderate stenosis. 2. Otherwise, no significant stenosis or arterial cutoff of the major intracranial vessels are within the neck.   I personally reviewed the images/study and I agree with the resident findings as stated. This study was interpreted at [facility], [place], [place].   MACRO: None   Signed by: Sahara Quintanilla 5/4/2024 1:50 AM Dictation workstation:   [id number]   Encounter Date: 05/05/24   ECG 12 lead   Result Value    Ventricular Rate 90    Atrial Rate 90    VT Interval 134    QRS Duration 106    QT Interval 330    QTC Calculation(Bazett) 403    P Axis 60    R Axis 32    T Axis 15    QRS Count 15    Q Onset 224    P Onset 157    P Offset 212    T Offset 389    QTC Fredericia 377     No echocardiogram results found for the past 12 months     Lab Results   Component Value Date    CHOL 221 (H) 05/05/2024    TRIG 373 (H) 05/05/2024    HDL 31.8 05/05/2024    CHHDL 6.9 05/05/2024    LDLF 44 06/15/2023    VLDL 75 (H) 05/05/2024    NHDL 189 (H) 05/05/2024     Lab Results   Component Value Date    BNP <2 05/05/2024    HGBA1C 10.6 (H) 05/05/2024     Lab Results   Component Value Date    GLUCOSE 143 (H) 05/13/2024     05/13/2024    K 3.7 05/13/2024     05/13/2024    CO2 20 (L) 05/13/2024    ANIONGAP 15 05/13/2024    BUN 32 (H) 05/13/2024    CREATININE 2.24 (H) 05/13/2024    GFRMALE CANCELED 08/03/2023    CALCIUM 7.8 (L) 05/13/2024    PHOS 4.8 05/08/2024    ALBUMIN 3.3 (L) 05/09/2024     Lab Results   Component Value Date    CALCIUM 7.8 (L) 05/13/2024    PHOS 4.8 05/08/2024    PROT 5.8 (L) 05/09/2024    ALBUMIN 3.3 (L) 05/09/2024    AST 10 05/09/2024     "ALT 6 (L) 05/09/2024    ALKPHOS 75 05/09/2024    BILITOT 0.5 05/09/2024     Lab Results   Component Value Date    WBC 8.0 05/13/2024    HGB 12.4 05/13/2024    HCT 36.9 05/13/2024    MCV 89 05/13/2024     05/13/2024     INR   Date Value Ref Range Status   05/05/2024 1.0 0.9 - 1.1 Final     Lab Results   Component Value Date    TSH 1.54 08/03/2023       Relevant ROS, Problem list, Past Medical/ Surgical/ Family/ Social history- reviewed and pertinent details noted in history.     Objective     Visit Vitals  OB Status Hysterectomy   Smoking Status Former       Neuro Exam:  Mild RUE drift, mild expressive aphasia    Assessment/Plan   1. Erosion of bladder suspension mesh, initial encounter (CMS-HCC)    2. History of stroke        PLAN:  -Stop plavix, continue ASA and statin  -Ok for gynecological surgery. Minimize days off aspirin (resume aspirin as soon as safe to do so following surgery)    Goals for STROKE PREVENTION- recommendations to reduce the risk of vascular events and promote brain health include monitoring and management of vascular risk factors and lifestyle choices to goal values.     Cardiovascular disease- Goal is monitoring and management of conditions that increase stroke risk.   Antithrombotic \"blood thinner\" medication- Antithrombic Therapy/Blood Thinners : Recommended to prevent a stroke or other vascular event  Blood pressure- Normal BP is <120/80 mmHg.  Blood Pressure : Goal is normal 120/80 mmHg  Cholesterol- Ideal lipid profile is an LDL-cholesterol <70 mg/dl, total cholesterol <200 mg/dl, fasting triglycerides < 150 mg/dl and HDL-cholesterol >55 mg/dl.   Blood sugar- Blood Sugar : Goal is fasting sugar  mg/dl, after eating less than 180 mg/dl; HbA1c less than 7%  Healthy physical activity- Goal is a moderate level of exercise at least 30 minutes/day, most days of the week.   Healthy weight- Goal is an ideal weight with a waistline <40\" for men or <35\" for women, and BMI of 18.5-25. "   Healthy diet- is rich in vegetables, fruits, whole grains, legumes and fish, low in salt, and avoids red meats and processed/ refined foods.   Healthy sleep- is restorative, ~7 hours/night, with identification and treatment of obstructive/ central sleep apnea that increases the risk of stroke and heart disease.   Smoking- Goal is to stop smoking any tobacco product and avoid second-hand smoke. For help to quit all forms of tobacco, call 6-099-QUIT-NOW (1-794.830.3495) to speak with a specialist at the [place] [facility].    Alcohol- Goal is moderation; no more than 2 servings for men and 1 serving for non-pregnant women.   Drug use- Goal is avoidance of illicit drugs that can cause blood pressure spikes and damage to blood vessels.   Stroke Warning Signs- know the symptoms of stroke and the importance of calling 911/EMS to access the quickest treatment.     No orders of the defined types were placed in this encounter.    Virtual or Telephone Consent    An interactive audio and video telecommunication system which permits real time communications between the patient (at the originating site) and provider (at the distant site) was utilized to provide this telehealth service.   Verbal consent was requested and obtained from Tracey Stephne on this date, 09/19/2024 for a telehealth visit.     I personally spent 40 minutes today, exclusive of procedures, providing care for this patient, including preparation, face to face time, documentation and other services such as review of medical records, diagnostic result, patient education, counseling, coordination of care as specified in the encounter.                     '

## 2024-10-02 ENCOUNTER — APPOINTMENT (OUTPATIENT)
Dept: OPHTHALMOLOGY | Facility: CLINIC | Age: 44
End: 2024-10-02
Payer: COMMERCIAL

## 2024-10-11 ENCOUNTER — CLINICAL SUPPORT (OUTPATIENT)
Dept: PREADMISSION TESTING | Facility: HOSPITAL | Age: 44
End: 2024-10-11
Payer: COMMERCIAL

## 2024-10-11 DIAGNOSIS — Z01.818 PREOP TESTING: ICD-10-CM

## 2024-10-18 ENCOUNTER — DOCUMENTATION (OUTPATIENT)
Dept: PREADMISSION TESTING | Facility: HOSPITAL | Age: 44
End: 2024-10-18

## 2024-10-18 ENCOUNTER — HOSPITAL ENCOUNTER (OUTPATIENT)
Dept: CARDIOLOGY | Facility: HOSPITAL | Age: 44
Discharge: HOME | End: 2024-10-18
Payer: COMMERCIAL

## 2024-10-18 ENCOUNTER — PRE-ADMISSION TESTING (OUTPATIENT)
Dept: PREADMISSION TESTING | Facility: HOSPITAL | Age: 44
End: 2024-10-18
Payer: COMMERCIAL

## 2024-10-18 ENCOUNTER — LAB (OUTPATIENT)
Dept: LAB | Facility: LAB | Age: 44
End: 2024-10-18
Payer: COMMERCIAL

## 2024-10-18 VITALS
DIASTOLIC BLOOD PRESSURE: 87 MMHG | OXYGEN SATURATION: 96 % | RESPIRATION RATE: 18 BRPM | HEIGHT: 64 IN | SYSTOLIC BLOOD PRESSURE: 149 MMHG | WEIGHT: 185.85 LBS | TEMPERATURE: 97.2 F | HEART RATE: 88 BPM | BODY MASS INDEX: 31.73 KG/M2

## 2024-10-18 DIAGNOSIS — I10 HYPERTENSION, UNSPECIFIED TYPE: Primary | ICD-10-CM

## 2024-10-18 DIAGNOSIS — I10 HYPERTENSION, UNSPECIFIED TYPE: ICD-10-CM

## 2024-10-18 DIAGNOSIS — I63.9 CEREBROVASCULAR ACCIDENT (CVA), UNSPECIFIED MECHANISM (MULTI): ICD-10-CM

## 2024-10-18 DIAGNOSIS — E11.42 DIABETIC POLYNEUROPATHY ASSOCIATED WITH TYPE 2 DIABETES MELLITUS (MULTI): ICD-10-CM

## 2024-10-18 DIAGNOSIS — Z01.818 PREPROCEDURAL EXAMINATION: ICD-10-CM

## 2024-10-18 LAB
ABO GROUP (TYPE) IN BLOOD: NORMAL
ANION GAP SERPL CALC-SCNC: 13 MMOL/L (ref 10–20)
ANTIBODY SCREEN: NORMAL
APPEARANCE UR: CLEAR
BACTERIA #/AREA URNS AUTO: ABNORMAL /HPF
BASOPHILS # BLD AUTO: 0.05 X10*3/UL (ref 0–0.1)
BASOPHILS NFR BLD AUTO: 0.6 %
BILIRUB UR STRIP.AUTO-MCNC: NEGATIVE MG/DL
BUN SERPL-MCNC: 27 MG/DL (ref 6–23)
CALCIUM SERPL-MCNC: 8.5 MG/DL (ref 8.6–10.3)
CHLORIDE SERPL-SCNC: 107 MMOL/L (ref 98–107)
CO2 SERPL-SCNC: 23 MMOL/L (ref 21–32)
COLOR UR: ABNORMAL
CREAT SERPL-MCNC: 1.7 MG/DL (ref 0.5–1.05)
EGFRCR SERPLBLD CKD-EPI 2021: 37 ML/MIN/1.73M*2
EOSINOPHIL # BLD AUTO: 0.34 X10*3/UL (ref 0–0.7)
EOSINOPHIL NFR BLD AUTO: 4 %
ERYTHROCYTE [DISTWIDTH] IN BLOOD BY AUTOMATED COUNT: 13.6 % (ref 11.5–14.5)
EST. AVERAGE GLUCOSE BLD GHB EST-MCNC: 128 MG/DL
GLUCOSE SERPL-MCNC: 99 MG/DL (ref 74–99)
GLUCOSE UR STRIP.AUTO-MCNC: NORMAL MG/DL
HBA1C MFR BLD: 6.1 %
HCT VFR BLD AUTO: 33.6 % (ref 36–46)
HGB BLD-MCNC: 10.8 G/DL (ref 12–16)
HOLD SPECIMEN: NORMAL
IMM GRANULOCYTES # BLD AUTO: 0.02 X10*3/UL (ref 0–0.7)
IMM GRANULOCYTES NFR BLD AUTO: 0.2 % (ref 0–0.9)
KETONES UR STRIP.AUTO-MCNC: NEGATIVE MG/DL
LEUKOCYTE ESTERASE UR QL STRIP.AUTO: ABNORMAL
LYMPHOCYTES # BLD AUTO: 2.79 X10*3/UL (ref 1.2–4.8)
LYMPHOCYTES NFR BLD AUTO: 32.8 %
MCH RBC QN AUTO: 28.7 PG (ref 26–34)
MCHC RBC AUTO-ENTMCNC: 32.1 G/DL (ref 32–36)
MCV RBC AUTO: 89 FL (ref 80–100)
MONOCYTES # BLD AUTO: 0.4 X10*3/UL (ref 0.1–1)
MONOCYTES NFR BLD AUTO: 4.7 %
MUCOUS THREADS #/AREA URNS AUTO: ABNORMAL /LPF
NEUTROPHILS # BLD AUTO: 4.9 X10*3/UL (ref 1.2–7.7)
NEUTROPHILS NFR BLD AUTO: 57.7 %
NITRITE UR QL STRIP.AUTO: NEGATIVE
NRBC BLD-RTO: 0 /100 WBCS (ref 0–0)
PH UR STRIP.AUTO: 6.5 [PH]
PLATELET # BLD AUTO: 346 X10*3/UL (ref 150–450)
POTASSIUM SERPL-SCNC: 4.2 MMOL/L (ref 3.5–5.3)
PROT UR STRIP.AUTO-MCNC: ABNORMAL MG/DL
RBC # BLD AUTO: 3.76 X10*6/UL (ref 4–5.2)
RBC # UR STRIP.AUTO: ABNORMAL /UL
RBC #/AREA URNS AUTO: ABNORMAL /HPF
RH FACTOR (ANTIGEN D): NORMAL
SODIUM SERPL-SCNC: 139 MMOL/L (ref 136–145)
SP GR UR STRIP.AUTO: 1.02
SQUAMOUS #/AREA URNS AUTO: ABNORMAL /HPF
UROBILINOGEN UR STRIP.AUTO-MCNC: NORMAL MG/DL
WBC # BLD AUTO: 8.5 X10*3/UL (ref 4.4–11.3)
WBC #/AREA URNS AUTO: ABNORMAL /HPF

## 2024-10-18 PROCEDURE — 93005 ELECTROCARDIOGRAM TRACING: CPT

## 2024-10-18 PROCEDURE — 36415 COLL VENOUS BLD VENIPUNCTURE: CPT

## 2024-10-18 PROCEDURE — 86900 BLOOD TYPING SEROLOGIC ABO: CPT

## 2024-10-18 PROCEDURE — 81001 URINALYSIS AUTO W/SCOPE: CPT

## 2024-10-18 PROCEDURE — 80048 BASIC METABOLIC PNL TOTAL CA: CPT

## 2024-10-18 PROCEDURE — 85025 COMPLETE CBC W/AUTO DIFF WBC: CPT

## 2024-10-18 PROCEDURE — 86901 BLOOD TYPING SEROLOGIC RH(D): CPT

## 2024-10-18 PROCEDURE — 86850 RBC ANTIBODY SCREEN: CPT

## 2024-10-18 PROCEDURE — 83036 HEMOGLOBIN GLYCOSYLATED A1C: CPT

## 2024-10-18 PROCEDURE — 99204 OFFICE O/P NEW MOD 45 MIN: CPT | Performed by: NURSE PRACTITIONER

## 2024-10-18 PROCEDURE — 93010 ELECTROCARDIOGRAM REPORT: CPT | Performed by: INTERNAL MEDICINE

## 2024-10-18 PROCEDURE — 87081 CULTURE SCREEN ONLY: CPT | Mod: AHULAB | Performed by: NURSE PRACTITIONER

## 2024-10-18 RX ORDER — CHLORHEXIDINE GLUCONATE ORAL RINSE 1.2 MG/ML
15 SOLUTION DENTAL DAILY
Qty: 30 ML | Refills: 0 | Status: SHIPPED | OUTPATIENT
Start: 2024-10-18 | End: 2024-10-20

## 2024-10-18 ASSESSMENT — ENCOUNTER SYMPTOMS
NECK NEGATIVE: 1
GASTROINTESTINAL NEGATIVE: 1
CONSTITUTIONAL NEGATIVE: 1
CARDIOVASCULAR NEGATIVE: 1
RESPIRATORY NEGATIVE: 1

## 2024-10-18 NOTE — CPM/PAT H&P
CPM/PAT Evaluation       Name: Tracey Stephen (Tracey Stephen)  /Age: 1980/44 y.o.     SURGEON :DR ERICKSON CAN  Surgery, Date, and Length:  Bladder Sling Removal vs. Revision; Possible Insertion of Mid-Urethral Sling Cystoscopy 24    HPI:  This a 44y.o. fe-male who presents for presurgical evaluation for  for above mentioned procedure  . Pt had original bladder sling placed in 2018. 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 Can the patient elects to proceed with the planned procedure.     Past Medical History:   Diagnosis Date    CKD (chronic kidney disease) stage 4, GFR 15-29 ml/min (Multi)     24 Cr 2.24 GFR 27    CVA (cerebral vascular accident) (Multi) 2023    L PLIC stroke; mild residual right sided weakness; Follows with neurology Michelle Leone MD LOV 24    Erosion of bladder suspension mesh, sequela     Plan: Bladder Sling Removal vs. Revision; Possible Insertion of Mid-Urethral Sling; Cystoscopy 24    Former smoker     H/O transesophageal echocardiography (KARIN) for monitoring 2023    CONCLUSIONS:  1. Left ventricular systolic function is normal with a 55-60% estimated ejection fraction.  2. Spectral Doppler shows an impaired relaxation pattern of left ventricular diastolic filling.  3. There is no evidence of a patent foramen ovale.    Hypertension     Obesity     Preoperative clearance     neurology Michelle Leone MD LOV 24 PLAN: -Stop plavix, continue ASA and statin -Ok for gynecological surgery. Minimize days off aspirin (resume aspirin as soon as safe to do so following surgery)    Psychiatric disorder     JODY (stress urinary incontinence, female)     Type 2 diabetes mellitus     24 A1C 10.6%       Past Surgical History:   Procedure Laterality Date    ABCESS DRAINAGE  2010    INCISION AND DRAINAGE ABCESS AXILLA    BREAST SURGERY Bilateral     Breast Surgery Reduction Procedure    CYSTOSCOPY  2024     HYSTERECTOMY      Laparoscopy With Total Hysterectomy For Uterus > 250g    MR HEAD ANGIO WO IV CONTRAST  06/15/2023    MR HEAD ANGIO WO IV CONTRAST 6/15/2023 [facility] MRI    MR NECK ANGIO WO IV CONTRAST  06/15/2023    MR NECK ANGIO WO IV CONTRAST 6/15/2023 [facility]    SALPINGECTOMY Bilateral     Oviductal Surgery Bilateral Salpingectomy    TUBAL LIGATION       Anesthesia History  Pt denies any past history of anesthetic complications such as PONV, awareness, prolonged sedation, dental damage, aspiration, cardiac arrest, difficult intubation, difficult I.V. access or unexpected hospital admissions.  NO malignant hyperthermia and or pseudo cholinesterase deficiency.    The patient is not  a [employer] and will accept blood and blood products if medically indicated.   No history of blood transfusions .Type and screen  sent.     Social History  Social History     Substance and Sexual Activity   Drug Use Yes    Types: Marijuana    Comment: Daily use    Pt instructed t stop 7 days before surgery   Social History     Substance and Sexual Activity   Alcohol Use Not Currently      Social History     Tobacco Use   Smoking Status Former    Current packs/day: 0.00    Average packs/day: 0.5 packs/day for 27.3 years (13.7 ttl pk-yrs)    Types: Cigarettes    Start date:     Quit date: 2023    Years since quittin.4    Passive exposure: Past   Smokeless Tobacco Never            Family History   Problem Relation Name Age of Onset    Heart disease Mother      Asthma Mother      Thyroid disease Mother      Lung cancer Father      Diabetes Maternal Grandmother      Thyroid disease Maternal Grandmother      Diabetes Paternal Grandmother         Allergies   Allergen Reactions    Hay Fever And Allergy Relief Itching    Pollen Extracts Itching         Current Outpatient Medications:     acetaminophen (Tylenol) 325 mg tablet, Take 1 tablet (325 mg) by mouth every 4 hours., Disp: 30 tablet, Rfl: 2    amLODIPine (Norvasc) 10 mg  "tablet, Take 1 tablet (10 mg) by mouth once daily., Disp: 30 tablet, Rfl: 11    atorvastatin (Lipitor) 80 mg tablet, Take 1 tablet (80 mg) by mouth once daily at bedtime., Disp: 90 tablet, Rfl: 3    hydroCHLOROthiazide (HYDRODiuril) 25 mg tablet, , Disp: , Rfl:     insulin lispro (HumaLOG) 100 unit/mL injection, Inject 10 Units under the skin 3 times daily (morning, midday, late afternoon)., Disp: , Rfl:     Lantus U-100 Insulin 100 unit/mL injection, Inject 30 Units under the skin once daily in the morning., Disp: , Rfl:     metFORMIN (Glucophage) 500 mg tablet, Take 1 tablet (500 mg) by mouth 2 times daily (morning and late afternoon)., Disp: , Rfl:     mirtazapine (Remeron) 7.5 mg tablet, Take 1 tablet (7.5 mg) by mouth once daily at bedtime., Disp: , Rfl:     Victoza 2-Tate 0.6 mg/0.1 mL (18 mg/3 mL) injection, Inject 0.1 mL (0.6 mg) under the skin once daily., Disp: , Rfl:     Alcohol Prep Pads pads, medicated, USE TOPICALLY PRIOR TO TESTING OR INJECTING INSULIN., Disp: 100 each, Rfl: 11    aspirin 81 mg chewable tablet, Chew 1 tablet (81 mg) once daily. TAKE 1 TABLET BY ORAL/FEEDING TUBE ROUTE ONCE DAILY. (Patient not taking: Reported on 10/18/2024), Disp: 30 tablet, Rfl: 11    chlorhexidine (Peridex) 0.12 % solution, Use 15 mL in the mouth or throat once daily for 2 days., Disp: 30 mL, Rfl: 0    loratadine (Claritin) 10 mg tablet, Take 1 tablet (10 mg) by mouth once daily., Disp: 30 tablet, Rfl: 5    OneTouch Ultra Test strip, 3 each before breakfast, before lunch, before evening meal, and at bedtime., Disp: 100 strip, Rfl: 11    pen needle, diabetic 31 gauge x 5/16\" needle, Use to inject 1-4 times daily as directed., Disp: 100 each, Rfl: 11       PAT ROS:   Constitutional:   neg    Neuro/Psych:   Eyes:   Ears:   Nose:   neg    Mouth:   neg    Throat:   neg    Neck:   neg    Cardio:   neg    Respiratory:   neg    Endocrine:   GI:   neg    :   neg    Musculoskeletal:   Hematologic:   neg    Skin:  neg  " "      Physical Exam  Vitals reviewed.   Constitutional:       Appearance: Normal appearance.   HENT:      Head: Normocephalic.      Mouth/Throat:      Mouth: Mucous membranes are dry.   Eyes:      Extraocular Movements: Extraocular movements intact.      Pupils: Pupils are equal, round, and reactive to light.   Cardiovascular:      Rate and Rhythm: Normal rate and regular rhythm.      Pulses: Normal pulses.      Heart sounds: Normal heart sounds.   Pulmonary:      Effort: Pulmonary effort is normal.      Breath sounds: Normal breath sounds.   Musculoskeletal:      Cervical back: Normal range of motion.      Comments: RT SIDED WEAKNESS 2/2 CVA   Neurological:      Mental Status: She is alert and oriented to person, place, and time.   Psychiatric:         Behavior: Behavior normal.          PAT AIRWAY:   Airway:     Mallampati::  II  normal        /87   Pulse 88   Temp 36.2 °C (97.2 °F)   Resp 18   Ht 1.635 m (5' 4.37\")   Wt 84.3 kg (185 lb 13.6 oz)   SpO2 96%   BMI 31.53 kg/m²     Lab Results   Component Value Date    WBC 8.5 10/18/2024    HGB 10.8 (L) 10/18/2024    HCT 33.6 (L) 10/18/2024    MCV 89 10/18/2024     10/18/2024     Lab Results   Component Value Date    GLUCOSE 99 10/18/2024    CALCIUM 8.5 (L) 10/18/2024     10/18/2024    K 4.2 10/18/2024    CO2 23 10/18/2024     10/18/2024    BUN 27 (H) 10/18/2024    CREATININE 1.70 (H) 10/18/2024     Lab Results   Component Value Date    HGBA1C 6.1 (H) 10/18/2024       UA + LEUK ESTERACE   UC PENDING       ASSESSMENT/PLAN    Patient is a 44year-old  scheduled for Bladder Sling Removal vs. Revision; Possible Insertion of Mid-Urethral Sling Cystoscopy  with Dr. Strickland   on  11/5/24 .  CARDIOVASCULAR:  RCRI score / Risk: The patients score is 2 based on history . Per ACC/AHA guidelines this places her  at  10% risk for MACE undergoing a intermediate  risk procedure . The patient has the following risk factors:IDDM , CVA   Functional " Capacity: The patients exercise tolerance is  4  METS. This is based on the patient's limited only due to rt side weakness. Patient denies  active cardiac symptoms or anginal equivalents .    CVA/ANTIPLATELET THERAPY:  Patient was instructed to CONTINUE  Low dose aspirin. Neurology states patient is acceptable for GYN surgery .      PULMONARY:  The patient has the following factors that place them at increased risk of perioperative pulmonary complications BMI greater than 27/greater than 2.5 hour procedure.  Postoperatively the patient would benefit from early pulmonary toilet/incentive spirometry q 1-2 hours while awake/pulse oximetry/cautious use of respiratory depressant medications such as opioids/elevate the HOB/oral hygiene.    CKD :  The patient has had chronic kidney disease d is currently at stage 3b,with creatinine levels that (have improved ..  Recommendations: Avoid intraoperative hypotension. Avoid nephrotoxic drugs and radicontrast dyes.  Recheck BMP periodically in the post op period.    IDDM:  The patient has well controlled diabetes.Currently the patient manages their diabetes with oral agents/insulin, her  recent A1C was 6.1 on10/19/24.  The patient  was told to  hold oral antihyperglycemic medications 24 hrs prior to procedure   The morning of  surgery the patient was instructed to administer  half their long acting insulin.        DVT:  CAPRINI SCORE=11  The patient has the following factors that increase her  Risk for thrombus formation ; Virchow's triad ,hx cva, age>45, bmi>25 , Surgical procedure >2 hrs  procedure .    Recommendations: DVT prophylaxis  per Dr. Strickland  protocol . SCD's, RUBÉN's, and early ambulation are recommended. Heparin or LMWH is recommended for the very high risk .      Risk assessment complete.  Patient is scheduled for  INTERMEDIATE  surgical risk procedure.  Patient is considered an invreased, not prohibiiitive  risk to proceed with the planned  procedure.      Preoperative medication instructions were provided and reviewed with the patient.  Any additional testing or evaluation was explained to the patient.  Nothing by mouth instructions were discussed and patient's questions were answered prior to conclusion to this encounter.  Patient verbalized understanding of preoperative instructions given in preadmission testing; discharge instructions available in EMR.

## 2024-10-18 NOTE — PREPROCEDURE INSTRUCTIONS
Medication List            Accurate as of October 18, 2024 10:35 AM. Always use your most recent med list.                acetaminophen 325 mg tablet  Commonly known as: Tylenol  Take 1 tablet (325 mg) by mouth every 4 hours.  Medication Adjustments for Surgery: Take/Use as prescribed     Alcohol Prep Pads pads, medicated  Generic drug: alcohol swabs  USE TOPICALLY PRIOR TO TESTING OR INJECTING INSULIN.  Medication Adjustments for Surgery: Take/Use as prescribed     amLODIPine 10 mg tablet  Commonly known as: Norvasc  Take 1 tablet (10 mg) by mouth once daily.  Medication Adjustments for Surgery: Take on the morning of surgery     aspirin 81 mg chewable tablet  Chew 1 tablet (81 mg) once daily. TAKE 1 TABLET BY ORAL/FEEDING TUBE ROUTE ONCE DAILY.  Medication Adjustments for Surgery: Take on the morning of surgery     atorvastatin 80 mg tablet  Commonly known as: Lipitor  Take 1 tablet (80 mg) by mouth once daily at bedtime.  Medication Adjustments for Surgery: Take on the morning of surgery     chlorhexidine 0.12 % solution  Commonly known as: Peridex  Use 15 mL in the mouth or throat once daily for 2 days.  Medication Adjustments for Surgery: Take/Use as prescribed     hydroCHLOROthiazide 25 mg tablet  Commonly known as: HYDRODiuril  Medication Adjustments for Surgery: Take on the morning of surgery     insulin lispro 100 unit/mL injection  Commonly known as: HumaLOG  Medication Adjustments for Surgery: Do Not take on the morning of surgery     Lantus U-100 Insulin 100 unit/mL injection  Generic drug: insulin glargine  Notes to patient: MORNING OF SURGERY TAKE 1/2 OF MORNING DOSE      loratadine 10 mg tablet  Commonly known as: Claritin  Take 1 tablet (10 mg) by mouth once daily.  Medication Adjustments for Surgery: Do Not take on the morning of surgery     metFORMIN 500 mg tablet  Commonly known as: Glucophage  Medication Adjustments for Surgery: Take last dose 1 day (24 hours) before surgery     mirtazapine  "7.5 mg tablet  Commonly known as: Remeron  Medication Adjustments for Surgery: Take on the morning of surgery     OneTouch Ultra Test strip  Generic drug: blood sugar diagnostic  3 each before breakfast, before lunch, before evening meal, and at bedtime.  Medication Adjustments for Surgery: Take/Use as prescribed     pen needle, diabetic 31 gauge x 5/16\" needle  Use to inject 1-4 times daily as directed.  Medication Adjustments for Surgery: Take/Use as prescribed     Victoza 2-Tate 0.6 mg/0.1 mL (18 mg/3 mL) injection  Generic drug: liraglutide  Medication Adjustments for Surgery: Do Not take on the morning of surgery                 CONTACT SURGEON'S OFFICE IF YOU DEVELOP:  * Fever = 100.4 F   * New respiratory symptoms (e.g. cough, shortness of breath, respiratory distress, sore throat)  * Recent loss of taste or smell  *Flu like symptoms such as headache, fatigue or gastrointestinal symptoms  * You develop any open sores, shingles, burning or painful urination   AND/OR:  * You no longer wish to have the surgery.  * Any other personal circumstances change that may lead to the need to cancel or defer this surgery.  *You were admitted to any hospital within one week of your planned procedure.    SMOKING:  *Quitting smoking can make a huge difference to your health and recovery from surgery.    *If you need help with quitting, call 4-800-QUIT-NOW.    THE DAY BEFORE SURGERY:  *Do not eat any food after midnight the night before surgery.   YOU MAY TAKE SIPS OF WATER FOR MORNING MEDICATION  until TWO hours before your instructed arrival time to the hospital  DIABETICS:  Please check fasting blood sugar  upon waking up.  If fasting sugar is <80 mg/dl, please drink 100ml/3oz of apple juice no later than 2 hours prior to surgery.      SURGICAL TIME  *You will be contacted between 2 p.m. and 6 p.m. the business day before your surgery with your arrival time.  *If you haven't received a call by 6pm, call " [phone].  *Scheduled surgery times may change and you will be notified if this occurs-check your personal voicemail for any updates.    ON THE MORNING OF SURGERY:  *Wear comfortable, loose fitting clothing.   *Do not use moisturizers, creams, lotions or perfume.  *All jewelry and valuables should be left at home.  *Prosthetic devices such as contact lenses, hearing aids, dentures, eyelash extensions, hairpins and body piercing must be removed before surgery.    BRING WITH YOU:  *Photo ID and insurance card  *Current list of medicines and allergies  *Pacemaker/Defibrillator/Heart stent cards  *CPAP machine and mask  *Slings/splints/crutches  *Copy of your complete Advanced Directive/DHPOA-if applicable  *Neurostimulator implant remote    PARKING AND ARRIVAL:  *Check in at the Main Entrance desk and let them know you are here for surgery.  *You will be directed to the 2nd floor surgical waiting area.    AFTER OUTPATIENT SURGERY:  *A responsible adult MUST accompany you at the time of discharge and stay with you for 24 hours after your surgery.  *You may NOT drive yourself home after surgery.  *You may use a taxi or ride sharing service ("[employer]", Uber) to return home ONLY if you are accompanied by a friend or family member.  *Instructions for resuming your medications will be provided by your surgeon.        Home Preoperative Antibacterial Shower     What is a home preoperative antibacterial shower?  This shower is a way of cleaning the skin with a germ killing soap before surgery.  The soap contains chlorhexidine, commonly known as CHG.  CHG is a soap for your skin with germ killing ability.  Let your doctor know if you are allergic to chlorhexidine.    Why do I need to take a preoperative antibacterial shower?  Skin is not sterile.  It is best to try to make your skin as free of germs as possible before surgery.  Proper cleansing with a germ killing soap before surgery can lower the number of germs on your skin.   This helps to reduce the risk of infection at the surgical site.  Following the instructions listed below will help you prepare your skin for surgery.      How do I use the CHG skin cleanser?  Steps:  Begin using your CHG soap five days before your scheduled surgery on ________________________.    Days 1-4 Shower before bed:  Wash your face and genitals with your normal soap and rinse.    Wash and rinse your hair using the CHG soap. Rinse completely, do not condition your   Hair.          3.    Apply the CHG soap to a clean wet washcloth.  Turn the water off or move away                From the water spray to avoid premature rinsing of the CHG soap as you are applying.     4.   Lather your entire body from the neck down.  Do not use on your face or genitals.   Pay special attention to the area(s) where your incision(s) will be located unless they are on your face.  Avoid scrubbing your skin too hard.  The important point is to have the CHG soap sit on your skin for 3 minutes.    When the 3 minutes are up, turn on the water and rinse the CHG soap off your body completely.   Pat yourself dry with a clean, freshly-laundered towel.  Dress in clean, freshly laundered night clothes.    Be sure to sleep with clean, freshly laundered sheets.  Day 5:  Last shower is the morning before surgery: Follow above Instructions.    NOTE:    *Hair extensions should be removed    *Keep CHG soap out of eyes and ear canals   *DO NOT wash with regular soap on your body after you have used the CHG        soap solution  *DO NOT apply powders, lotions, or perfume.  *Deodorant may be used days 1-4, BUT NOT the day of surgery    Who should I contact if I have any questions regarding the use of CHG soap?  Call the [facility], Preadmission Testing at [phone] if you have any questions.              Patient Information: Pre-Operative Infection Prevention Measures     Why did I have my nose, under my arms and  groin swabbed?  The purpose of the swab is to identify Staphylococcus aureus inside your nose or on your skin.  The swab was sent to the laboratory for culture.  A positive swab/culture for Staphylococcus aureus is called colonization or carriage.      What is Staphylococcus aureus?  Staphylococcus aureus, also known as “staph”, is a germ found on the skin or in the nose of healthy people.  Sometimes Staphylococcus aureus can get into the body and cause an infection.  This can be minor (such as pimples, boils or other skin problems).  It might also be serious (such as blood infection, pneumonia or a surgical site infection).    What is Staphylococcus aureus colonization or carriage?  Colonization or carriage means that a person has the germ but is not sick from it.  These bacteria can be spread on the hands or when breathing or sneezing.    How is Staphylococcus aureus spread?  It is most often spread by close contact with a person or item that carries it.    What happens if my culture is positive for Staphylococcus aureus?  Your doctor/medical team will use this information to guide any antibiotic treatment which may be necessary.  Regardless of the culture results, we will clean the inside of your nose with a betadine swab just before you have your surgery.      Will I get an infection if I have Staphylococcus aureus in my nose or on my skin?  Anyone can get an infection with Staphylococcus aureus.  However, the best way to reduce your risk of infection is to follow the instructions provided to you for the use of your CHG soap and dental rinse.        Who should I contact if I have any questions?  Call the [facility], Preadmission Testing at [phone] if you have any questions.           Patient Information: Oral/Dental Rinse  **This is a prescription; pick it up at your preferred local pharmacy **  What is oral/dental rinse?   It is a mouthwash. It is a way of cleaning the mouth  with a germ killing solution before your surgery.  The solution contains chlorhexidine, commonly known as CHG.   It is used inside the mouth to kill a bacteria known as Staphylococcus aureus.  Let your doctor know if you are allergic to Chlorhexidine.    Why do I need to use CHG oral/dental rinse?  The CHG oral/dental rinse helps to kill a bacteria in your mouth known a Staphylococcus aureus.     This reduces the risk of infection at the surgical site.      Using your CHG oral/dental rinse  STEPS:  Use your CHG oral/dental rinse after you brush your teeth the night before (at bedtime) and the morning of your surgery.  Follow all directions on your prescription label.    Use the cap on the container to measure 15ml (fill cap to fill line)  Swish (gargle if you can) the mouthwash in your mouth for at least 30 seconds, (do not to swallow) spit out  After you use your CHG rinse, do not rinse your mouth with water, drink or eat.  Please refer to prescription label for the appropriate time to resume oral intake  Dental rinse comes in one size bottle: 473ml ~16oz.  You will have leftover    rinse, discard after this use.    What side effects might I have using the CHG oral/dental rinse?  CHG rinse will stick to plaque on the teeth.  Brush and floss just before use.  Teeth brushing will help avoid staining of plaque during use.    Who should I contact if I have questions about the CHG oral/dental rinse?  Please call [facility], Preadmission Testing at [phone] if you have any questions

## 2024-10-18 NOTE — H&P (VIEW-ONLY)
CPM/PAT Evaluation       Name: Tracey Stephen (Tracey Stephen)  /Age: 1980/44 y.o.     SURGEON :DR ERICKSON CAN  Surgery, Date, and Length:  Bladder Sling Removal vs. Revision; Possible Insertion of Mid-Urethral Sling Cystoscopy 24    HPI:  This a 44y.o. fe-male who presents for presurgical evaluation for  for above mentioned procedure  . Pt had original bladder sling placed in 2018. 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 Can the patient elects to proceed with the planned procedure.     Past Medical History:   Diagnosis Date    CKD (chronic kidney disease) stage 4, GFR 15-29 ml/min (Multi)     24 Cr 2.24 GFR 27    CVA (cerebral vascular accident) (Multi) 2023    L PLIC stroke; mild residual right sided weakness; Follows with neurology Michelle Leone MD LOV 24    Erosion of bladder suspension mesh, sequela     Plan: Bladder Sling Removal vs. Revision; Possible Insertion of Mid-Urethral Sling; Cystoscopy 24    Former smoker     H/O transesophageal echocardiography (KARIN) for monitoring 2023    CONCLUSIONS:  1. Left ventricular systolic function is normal with a 55-60% estimated ejection fraction.  2. Spectral Doppler shows an impaired relaxation pattern of left ventricular diastolic filling.  3. There is no evidence of a patent foramen ovale.    Hypertension     Obesity     Preoperative clearance     neurology Michelle Leone MD LOV 24 PLAN: -Stop plavix, continue ASA and statin -Ok for gynecological surgery. Minimize days off aspirin (resume aspirin as soon as safe to do so following surgery)    Psychiatric disorder     JODY (stress urinary incontinence, female)     Type 2 diabetes mellitus     24 A1C 10.6%       Past Surgical History:   Procedure Laterality Date    ABCESS DRAINAGE  2010    INCISION AND DRAINAGE ABCESS AXILLA    BREAST SURGERY Bilateral     Breast Surgery Reduction Procedure    CYSTOSCOPY  2024     HYSTERECTOMY      Laparoscopy With Total Hysterectomy For Uterus > 250g    MR HEAD ANGIO WO IV CONTRAST  06/15/2023    MR HEAD ANGIO WO IV CONTRAST 6/15/2023 [facility] MRI    MR NECK ANGIO WO IV CONTRAST  06/15/2023    MR NECK ANGIO WO IV CONTRAST 6/15/2023 [facility]    SALPINGECTOMY Bilateral     Oviductal Surgery Bilateral Salpingectomy    TUBAL LIGATION       Anesthesia History  Pt denies any past history of anesthetic complications such as PONV, awareness, prolonged sedation, dental damage, aspiration, cardiac arrest, difficult intubation, difficult I.V. access or unexpected hospital admissions.  NO malignant hyperthermia and or pseudo cholinesterase deficiency.    The patient is not  a Yarsani and will accept blood and blood products if medically indicated.   No history of blood transfusions .Type and screen  sent.     Social History  Social History     Substance and Sexual Activity   Drug Use Yes    Types: Marijuana    Comment: Daily use    Pt instructed t stop 7 days before surgery   Social History     Substance and Sexual Activity   Alcohol Use Not Currently      Social History     Tobacco Use   Smoking Status Former    Current packs/day: 0.00    Average packs/day: 0.5 packs/day for 27.3 years (13.7 ttl pk-yrs)    Types: Cigarettes    Start date:     Quit date: 2023    Years since quittin.4    Passive exposure: Past   Smokeless Tobacco Never            Family History   Problem Relation Name Age of Onset    Heart disease Mother      Asthma Mother      Thyroid disease Mother      Lung cancer Father      Diabetes Maternal Grandmother      Thyroid disease Maternal Grandmother      Diabetes Paternal Grandmother         Allergies   Allergen Reactions    Hay Fever And Allergy Relief Itching    Pollen Extracts Itching         Current Outpatient Medications:     acetaminophen (Tylenol) 325 mg tablet, Take 1 tablet (325 mg) by mouth every 4 hours., Disp: 30 tablet, Rfl: 2    amLODIPine (Norvasc) 10 mg  "tablet, Take 1 tablet (10 mg) by mouth once daily., Disp: 30 tablet, Rfl: 11    atorvastatin (Lipitor) 80 mg tablet, Take 1 tablet (80 mg) by mouth once daily at bedtime., Disp: 90 tablet, Rfl: 3    hydroCHLOROthiazide (HYDRODiuril) 25 mg tablet, , Disp: , Rfl:     insulin lispro (HumaLOG) 100 unit/mL injection, Inject 10 Units under the skin 3 times daily (morning, midday, late afternoon)., Disp: , Rfl:     Lantus U-100 Insulin 100 unit/mL injection, Inject 30 Units under the skin once daily in the morning., Disp: , Rfl:     metFORMIN (Glucophage) 500 mg tablet, Take 1 tablet (500 mg) by mouth 2 times daily (morning and late afternoon)., Disp: , Rfl:     mirtazapine (Remeron) 7.5 mg tablet, Take 1 tablet (7.5 mg) by mouth once daily at bedtime., Disp: , Rfl:     Victoza 2-Tate 0.6 mg/0.1 mL (18 mg/3 mL) injection, Inject 0.1 mL (0.6 mg) under the skin once daily., Disp: , Rfl:     Alcohol Prep Pads pads, medicated, USE TOPICALLY PRIOR TO TESTING OR INJECTING INSULIN., Disp: 100 each, Rfl: 11    aspirin 81 mg chewable tablet, Chew 1 tablet (81 mg) once daily. TAKE 1 TABLET BY ORAL/FEEDING TUBE ROUTE ONCE DAILY. (Patient not taking: Reported on 10/18/2024), Disp: 30 tablet, Rfl: 11    chlorhexidine (Peridex) 0.12 % solution, Use 15 mL in the mouth or throat once daily for 2 days., Disp: 30 mL, Rfl: 0    loratadine (Claritin) 10 mg tablet, Take 1 tablet (10 mg) by mouth once daily., Disp: 30 tablet, Rfl: 5    OneTouch Ultra Test strip, 3 each before breakfast, before lunch, before evening meal, and at bedtime., Disp: 100 strip, Rfl: 11    pen needle, diabetic 31 gauge x 5/16\" needle, Use to inject 1-4 times daily as directed., Disp: 100 each, Rfl: 11       PAT ROS:   Constitutional:   neg    Neuro/Psych:   Eyes:   Ears:   Nose:   neg    Mouth:   neg    Throat:   neg    Neck:   neg    Cardio:   neg    Respiratory:   neg    Endocrine:   GI:   neg    :   neg    Musculoskeletal:   Hematologic:   neg    Skin:  neg  " "      Physical Exam  Vitals reviewed.   Constitutional:       Appearance: Normal appearance.   HENT:      Head: Normocephalic.      Mouth/Throat:      Mouth: Mucous membranes are dry.   Eyes:      Extraocular Movements: Extraocular movements intact.      Pupils: Pupils are equal, round, and reactive to light.   Cardiovascular:      Rate and Rhythm: Normal rate and regular rhythm.      Pulses: Normal pulses.      Heart sounds: Normal heart sounds.   Pulmonary:      Effort: Pulmonary effort is normal.      Breath sounds: Normal breath sounds.   Musculoskeletal:      Cervical back: Normal range of motion.      Comments: RT SIDED WEAKNESS 2/2 CVA   Neurological:      Mental Status: She is alert and oriented to person, place, and time.   Psychiatric:         Behavior: Behavior normal.          PAT AIRWAY:   Airway:     Mallampati::  II  normal        /87   Pulse 88   Temp 36.2 °C (97.2 °F)   Resp 18   Ht 1.635 m (5' 4.37\")   Wt 84.3 kg (185 lb 13.6 oz)   SpO2 96%   BMI 31.53 kg/m²     Lab Results   Component Value Date    WBC 8.5 10/18/2024    HGB 10.8 (L) 10/18/2024    HCT 33.6 (L) 10/18/2024    MCV 89 10/18/2024     10/18/2024     Lab Results   Component Value Date    GLUCOSE 99 10/18/2024    CALCIUM 8.5 (L) 10/18/2024     10/18/2024    K 4.2 10/18/2024    CO2 23 10/18/2024     10/18/2024    BUN 27 (H) 10/18/2024    CREATININE 1.70 (H) 10/18/2024     Lab Results   Component Value Date    HGBA1C 6.1 (H) 10/18/2024       UA + LEUK ESTERACE   UC PENDING       ASSESSMENT/PLAN    Patient is a 44year-old  scheduled for Bladder Sling Removal vs. Revision; Possible Insertion of Mid-Urethral Sling Cystoscopy  with Dr. Strickland   on  11/5/24 .  CARDIOVASCULAR:  RCRI score / Risk: The patients score is 2 based on history . Per ACC/AHA guidelines this places her  at  10% risk for MACE undergoing a intermediate  risk procedure . The patient has the following risk factors:IDDM , CVA   Functional " Capacity: The patients exercise tolerance is  4  METS. This is based on the patient's limited only due to rt side weakness. Patient denies  active cardiac symptoms or anginal equivalents .    CVA/ANTIPLATELET THERAPY:  Patient was instructed to CONTINUE  Low dose aspirin. Neurology states patient is acceptable for GYN surgery .      PULMONARY:  The patient has the following factors that place them at increased risk of perioperative pulmonary complications BMI greater than 27/greater than 2.5 hour procedure.  Postoperatively the patient would benefit from early pulmonary toilet/incentive spirometry q 1-2 hours while awake/pulse oximetry/cautious use of respiratory depressant medications such as opioids/elevate the HOB/oral hygiene.    CKD :  The patient has had chronic kidney disease d is currently at stage 3b,with creatinine levels that (have improved ..  Recommendations: Avoid intraoperative hypotension. Avoid nephrotoxic drugs and radicontrast dyes.  Recheck BMP periodically in the post op period.    IDDM:  The patient has well controlled diabetes.Currently the patient manages their diabetes with oral agents/insulin, her  recent A1C was 6.1 on10/19/24.  The patient  was told to  hold oral antihyperglycemic medications 24 hrs prior to procedure   The morning of  surgery the patient was instructed to administer  half their long acting insulin.        DVT:  CAPRINI SCORE=11  The patient has the following factors that increase her  Risk for thrombus formation ; Virchow's triad ,hx cva, age>45, bmi>25 , Surgical procedure >2 hrs  procedure .    Recommendations: DVT prophylaxis  per Dr. Strickland  protocol . SCD's, RUBÉN's, and early ambulation are recommended. Heparin or LMWH is recommended for the very high risk .      Risk assessment complete.  Patient is scheduled for  INTERMEDIATE  surgical risk procedure.  Patient is considered an invreased, not prohibiiitive  risk to proceed with the planned  procedure.      Preoperative medication instructions were provided and reviewed with the patient.  Any additional testing or evaluation was explained to the patient.  Nothing by mouth instructions were discussed and patient's questions were answered prior to conclusion to this encounter.  Patient verbalized understanding of preoperative instructions given in preadmission testing; discharge instructions available in EMR.

## 2024-10-20 LAB — STAPHYLOCOCCUS SPEC CULT: NORMAL

## 2024-10-21 ENCOUNTER — TELEPHONE (OUTPATIENT)
Dept: PREADMISSION TESTING | Facility: HOSPITAL | Age: 44
End: 2024-10-21
Payer: COMMERCIAL

## 2024-10-21 DIAGNOSIS — R82.90 ABNORMAL URINALYSIS: Primary | ICD-10-CM

## 2024-10-21 LAB
ATRIAL RATE: 86 BPM
P AXIS: 67 DEGREES
P OFFSET: 216 MS
P ONSET: 156 MS
PR INTERVAL: 132 MS
Q ONSET: 222 MS
QRS COUNT: 15 BEATS
QRS DURATION: 90 MS
QT INTERVAL: 366 MS
QTC CALCULATION(BAZETT): 437 MS
QTC FREDERICIA: 412 MS
R AXIS: 60 DEGREES
T AXIS: 14 DEGREES
T OFFSET: 405 MS
VENTRICULAR RATE: 86 BPM

## 2024-10-21 PROCEDURE — 93005 ELECTROCARDIOGRAM TRACING: CPT

## 2024-10-25 ENCOUNTER — LAB (OUTPATIENT)
Dept: LAB | Facility: LAB | Age: 44
End: 2024-10-25
Payer: COMMERCIAL

## 2024-10-25 DIAGNOSIS — R82.90 ABNORMAL URINALYSIS: ICD-10-CM

## 2024-11-05 ENCOUNTER — ANESTHESIA EVENT (OUTPATIENT)
Dept: OPERATING ROOM | Facility: HOSPITAL | Age: 44
End: 2024-11-05
Payer: COMMERCIAL

## 2024-11-05 ENCOUNTER — HOSPITAL ENCOUNTER (OUTPATIENT)
Facility: HOSPITAL | Age: 44
Setting detail: OUTPATIENT SURGERY
Discharge: HOME | End: 2024-11-05
Attending: OBSTETRICS & GYNECOLOGY | Admitting: OBSTETRICS & GYNECOLOGY
Payer: COMMERCIAL

## 2024-11-05 ENCOUNTER — ANESTHESIA (OUTPATIENT)
Dept: OPERATING ROOM | Facility: HOSPITAL | Age: 44
End: 2024-11-05
Payer: COMMERCIAL

## 2024-11-05 VITALS
SYSTOLIC BLOOD PRESSURE: 146 MMHG | OXYGEN SATURATION: 93 % | RESPIRATION RATE: 18 BRPM | WEIGHT: 184.3 LBS | TEMPERATURE: 97 F | HEIGHT: 65 IN | BODY MASS INDEX: 30.71 KG/M2 | HEART RATE: 96 BPM | DIASTOLIC BLOOD PRESSURE: 86 MMHG

## 2024-11-05 DIAGNOSIS — T83.718S EROSION OF BLADDER SUSPENSION MESH, SEQUELA: ICD-10-CM

## 2024-11-05 DIAGNOSIS — N39.3 SUI (STRESS URINARY INCONTINENCE, FEMALE): Primary | ICD-10-CM

## 2024-11-05 LAB
ABO GROUP (TYPE) IN BLOOD: NORMAL
GLUCOSE BLD MANUAL STRIP-MCNC: 65 MG/DL (ref 74–99)
GLUCOSE BLD MANUAL STRIP-MCNC: 76 MG/DL (ref 74–99)
RH FACTOR (ANTIGEN D): NORMAL

## 2024-11-05 PROCEDURE — 3700000001 HC GENERAL ANESTHESIA TIME - INITIAL BASE CHARGE: Performed by: OBSTETRICS & GYNECOLOGY

## 2024-11-05 PROCEDURE — 88300 SURGICAL PATH GROSS: CPT | Mod: TC,SUR,AHULAB | Performed by: OBSTETRICS & GYNECOLOGY

## 2024-11-05 PROCEDURE — 88300 SURGICAL PATH GROSS: CPT | Performed by: STUDENT IN AN ORGANIZED HEALTH CARE EDUCATION/TRAINING PROGRAM

## 2024-11-05 PROCEDURE — C1771 REP DEV, URINARY, W/SLING: HCPCS | Performed by: OBSTETRICS & GYNECOLOGY

## 2024-11-05 PROCEDURE — 82947 ASSAY GLUCOSE BLOOD QUANT: CPT

## 2024-11-05 PROCEDURE — 36415 COLL VENOUS BLD VENIPUNCTURE: CPT | Performed by: OBSTETRICS & GYNECOLOGY

## 2024-11-05 PROCEDURE — 3600000003 HC OR TIME - INITIAL BASE CHARGE - PROCEDURE LEVEL THREE: Performed by: OBSTETRICS & GYNECOLOGY

## 2024-11-05 PROCEDURE — 7100000010 HC PHASE TWO TIME - EACH INCREMENTAL 1 MINUTE: Performed by: OBSTETRICS & GYNECOLOGY

## 2024-11-05 PROCEDURE — 2720000007 HC OR 272 NO HCPCS: Performed by: OBSTETRICS & GYNECOLOGY

## 2024-11-05 PROCEDURE — 3700000002 HC GENERAL ANESTHESIA TIME - EACH INCREMENTAL 1 MINUTE: Performed by: OBSTETRICS & GYNECOLOGY

## 2024-11-05 PROCEDURE — 2500000004 HC RX 250 GENERAL PHARMACY W/ HCPCS (ALT 636 FOR OP/ED): Performed by: OBSTETRICS & GYNECOLOGY

## 2024-11-05 PROCEDURE — 96372 THER/PROPH/DIAG INJ SC/IM: CPT | Performed by: STUDENT IN AN ORGANIZED HEALTH CARE EDUCATION/TRAINING PROGRAM

## 2024-11-05 PROCEDURE — 2500000001 HC RX 250 WO HCPCS SELF ADMINISTERED DRUGS (ALT 637 FOR MEDICARE OP): Performed by: OBSTETRICS & GYNECOLOGY

## 2024-11-05 PROCEDURE — 7100000009 HC PHASE TWO TIME - INITIAL BASE CHARGE: Performed by: OBSTETRICS & GYNECOLOGY

## 2024-11-05 PROCEDURE — 7100000002 HC RECOVERY ROOM TIME - EACH INCREMENTAL 1 MINUTE: Performed by: OBSTETRICS & GYNECOLOGY

## 2024-11-05 PROCEDURE — 57287 REVISE/REMOVE SLING REPAIR: CPT | Performed by: OBSTETRICS & GYNECOLOGY

## 2024-11-05 PROCEDURE — 2500000004 HC RX 250 GENERAL PHARMACY W/ HCPCS (ALT 636 FOR OP/ED): Performed by: ANESTHESIOLOGY

## 2024-11-05 PROCEDURE — A57288 PR SLING OPER STRES INCONTINENCE: Performed by: ANESTHESIOLOGY

## 2024-11-05 PROCEDURE — 2500000004 HC RX 250 GENERAL PHARMACY W/ HCPCS (ALT 636 FOR OP/ED): Performed by: STUDENT IN AN ORGANIZED HEALTH CARE EDUCATION/TRAINING PROGRAM

## 2024-11-05 PROCEDURE — 2780000003 HC OR 278 NO HCPCS: Performed by: OBSTETRICS & GYNECOLOGY

## 2024-11-05 PROCEDURE — 7100000001 HC RECOVERY ROOM TIME - INITIAL BASE CHARGE: Performed by: OBSTETRICS & GYNECOLOGY

## 2024-11-05 PROCEDURE — 3600000008 HC OR TIME - EACH INCREMENTAL 1 MINUTE - PROCEDURE LEVEL THREE: Performed by: OBSTETRICS & GYNECOLOGY

## 2024-11-05 PROCEDURE — 2500000004 HC RX 250 GENERAL PHARMACY W/ HCPCS (ALT 636 FOR OP/ED): Mod: JZ | Performed by: OBSTETRICS & GYNECOLOGY

## 2024-11-05 PROCEDURE — A57288 PR SLING OPER STRES INCONTINENCE: Performed by: NURSE ANESTHETIST, CERTIFIED REGISTERED

## 2024-11-05 PROCEDURE — 2500000004 HC RX 250 GENERAL PHARMACY W/ HCPCS (ALT 636 FOR OP/ED): Performed by: NURSE ANESTHETIST, CERTIFIED REGISTERED

## 2024-11-05 DEVICE — TRANSVAGINAL MID-URETHRAL SLING
Type: IMPLANTABLE DEVICE | Site: URETHRA | Status: FUNCTIONAL
Brand: ADVANTAGE FIT™ BLUE SYSTEM

## 2024-11-05 RX ORDER — SODIUM CHLORIDE, SODIUM LACTATE, POTASSIUM CHLORIDE, CALCIUM CHLORIDE 600; 310; 30; 20 MG/100ML; MG/100ML; MG/100ML; MG/100ML
100 INJECTION, SOLUTION INTRAVENOUS CONTINUOUS
Status: DISCONTINUED | OUTPATIENT
Start: 2024-11-05 | End: 2024-11-05 | Stop reason: HOSPADM

## 2024-11-05 RX ORDER — MIDAZOLAM HYDROCHLORIDE 1 MG/ML
INJECTION, SOLUTION INTRAMUSCULAR; INTRAVENOUS AS NEEDED
Status: DISCONTINUED | OUTPATIENT
Start: 2024-11-05 | End: 2024-11-05

## 2024-11-05 RX ORDER — LIDOCAINE HYDROCHLORIDE 10 MG/ML
0.1 INJECTION, SOLUTION EPIDURAL; INFILTRATION; INTRACAUDAL; PERINEURAL ONCE
Status: DISCONTINUED | OUTPATIENT
Start: 2024-11-05 | End: 2024-11-05 | Stop reason: HOSPADM

## 2024-11-05 RX ORDER — FENTANYL CITRATE 50 UG/ML
12.5 INJECTION, SOLUTION INTRAMUSCULAR; INTRAVENOUS EVERY 5 MIN PRN
Status: DISCONTINUED | OUTPATIENT
Start: 2024-11-05 | End: 2024-11-05 | Stop reason: HOSPADM

## 2024-11-05 RX ORDER — SODIUM CHLORIDE, SODIUM LACTATE, POTASSIUM CHLORIDE, CALCIUM CHLORIDE 600; 310; 30; 20 MG/100ML; MG/100ML; MG/100ML; MG/100ML
20 INJECTION, SOLUTION INTRAVENOUS CONTINUOUS
Status: DISCONTINUED | OUTPATIENT
Start: 2024-11-05 | End: 2024-11-05 | Stop reason: HOSPADM

## 2024-11-05 RX ORDER — FENTANYL CITRATE 50 UG/ML
INJECTION, SOLUTION INTRAMUSCULAR; INTRAVENOUS AS NEEDED
Status: DISCONTINUED | OUTPATIENT
Start: 2024-11-05 | End: 2024-11-05

## 2024-11-05 RX ORDER — LIDOCAINE HCL/PF 100 MG/5ML
SYRINGE (ML) INTRAVENOUS AS NEEDED
Status: DISCONTINUED | OUTPATIENT
Start: 2024-11-05 | End: 2024-11-05

## 2024-11-05 RX ORDER — CEFAZOLIN SODIUM 2 G/100ML
2 INJECTION, SOLUTION INTRAVENOUS ONCE
Status: DISCONTINUED | OUTPATIENT
Start: 2024-11-05 | End: 2024-11-05 | Stop reason: HOSPADM

## 2024-11-05 RX ORDER — ONDANSETRON HYDROCHLORIDE 2 MG/ML
4 INJECTION, SOLUTION INTRAVENOUS ONCE AS NEEDED
Status: DISCONTINUED | OUTPATIENT
Start: 2024-11-05 | End: 2024-11-05 | Stop reason: HOSPADM

## 2024-11-05 RX ORDER — FENTANYL CITRATE 50 UG/ML
50 INJECTION, SOLUTION INTRAMUSCULAR; INTRAVENOUS EVERY 5 MIN PRN
Status: DISCONTINUED | OUTPATIENT
Start: 2024-11-05 | End: 2024-11-05 | Stop reason: HOSPADM

## 2024-11-05 RX ORDER — LIDOCAINE HYDROCHLORIDE 20 MG/ML
INJECTION, SOLUTION INFILTRATION; PERINEURAL AS NEEDED
Status: DISCONTINUED | OUTPATIENT
Start: 2024-11-05 | End: 2024-11-05

## 2024-11-05 RX ORDER — ACETAMINOPHEN 325 MG/1
975 TABLET ORAL ONCE
Status: COMPLETED | OUTPATIENT
Start: 2024-11-05 | End: 2024-11-05

## 2024-11-05 RX ORDER — IBUPROFEN 600 MG/1
600 TABLET ORAL EVERY 6 HOURS PRN
Qty: 30 TABLET | Refills: 0 | Status: SHIPPED | OUTPATIENT
Start: 2024-11-05

## 2024-11-05 RX ORDER — FENTANYL CITRATE 50 UG/ML
25 INJECTION, SOLUTION INTRAMUSCULAR; INTRAVENOUS EVERY 5 MIN PRN
Status: DISCONTINUED | OUTPATIENT
Start: 2024-11-05 | End: 2024-11-05 | Stop reason: HOSPADM

## 2024-11-05 RX ORDER — KETOROLAC TROMETHAMINE 30 MG/ML
INJECTION, SOLUTION INTRAMUSCULAR; INTRAVENOUS AS NEEDED
Status: DISCONTINUED | OUTPATIENT
Start: 2024-11-05 | End: 2024-11-05

## 2024-11-05 RX ORDER — PROPOFOL 10 MG/ML
INJECTION, EMULSION INTRAVENOUS AS NEEDED
Status: DISCONTINUED | OUTPATIENT
Start: 2024-11-05 | End: 2024-11-05

## 2024-11-05 RX ORDER — CEFAZOLIN 1 G/1
INJECTION, POWDER, FOR SOLUTION INTRAVENOUS AS NEEDED
Status: DISCONTINUED | OUTPATIENT
Start: 2024-11-05 | End: 2024-11-05

## 2024-11-05 RX ORDER — POLYETHYLENE GLYCOL 3350 17 G/17G
17 POWDER, FOR SOLUTION ORAL DAILY
Qty: 30 PACKET | Refills: 0 | Status: SHIPPED | OUTPATIENT
Start: 2024-11-05 | End: 2024-12-05

## 2024-11-05 RX ORDER — ONDANSETRON HYDROCHLORIDE 2 MG/ML
INJECTION, SOLUTION INTRAVENOUS AS NEEDED
Status: DISCONTINUED | OUTPATIENT
Start: 2024-11-05 | End: 2024-11-05

## 2024-11-05 RX ORDER — CELECOXIB 200 MG/1
400 CAPSULE ORAL ONCE
Status: COMPLETED | OUTPATIENT
Start: 2024-11-05 | End: 2024-11-05

## 2024-11-05 RX ORDER — PHENYLEPHRINE HCL IN 0.9% NACL 1 MG/10 ML
SYRINGE (ML) INTRAVENOUS AS NEEDED
Status: DISCONTINUED | OUTPATIENT
Start: 2024-11-05 | End: 2024-11-05

## 2024-11-05 RX ORDER — ESMOLOL HYDROCHLORIDE 10 MG/ML
INJECTION INTRAVENOUS AS NEEDED
Status: DISCONTINUED | OUTPATIENT
Start: 2024-11-05 | End: 2024-11-05

## 2024-11-05 RX ORDER — ACETAMINOPHEN 500 MG
1000 TABLET ORAL EVERY 6 HOURS PRN
Qty: 30 TABLET | Refills: 0 | Status: SHIPPED | OUTPATIENT
Start: 2024-11-05

## 2024-11-05 RX ORDER — HEPARIN SODIUM 5000 [USP'U]/ML
5000 INJECTION, SOLUTION INTRAVENOUS; SUBCUTANEOUS ONCE
Status: COMPLETED | OUTPATIENT
Start: 2024-11-05 | End: 2024-11-05

## 2024-11-05 RX ORDER — DROPERIDOL 2.5 MG/ML
0.62 INJECTION, SOLUTION INTRAMUSCULAR; INTRAVENOUS ONCE AS NEEDED
Status: DISCONTINUED | OUTPATIENT
Start: 2024-11-05 | End: 2024-11-05 | Stop reason: HOSPADM

## 2024-11-05 RX ORDER — OXYCODONE HYDROCHLORIDE 5 MG/1
5 TABLET ORAL EVERY 6 HOURS PRN
Qty: 5 TABLET | Refills: 0 | Status: SHIPPED | OUTPATIENT
Start: 2024-11-05

## 2024-11-05 RX ORDER — DEXTROSE, SODIUM CHLORIDE, SODIUM LACTATE, POTASSIUM CHLORIDE, AND CALCIUM CHLORIDE 5; .6; .31; .03; .02 G/100ML; G/100ML; G/100ML; G/100ML; G/100ML
50 INJECTION, SOLUTION INTRAVENOUS CONTINUOUS
Status: DISCONTINUED | OUTPATIENT
Start: 2024-11-05 | End: 2024-11-05 | Stop reason: HOSPADM

## 2024-11-05 RX ORDER — PHENAZOPYRIDINE HYDROCHLORIDE 100 MG/1
200 TABLET, FILM COATED ORAL ONCE
Status: COMPLETED | OUTPATIENT
Start: 2024-11-05 | End: 2024-11-05

## 2024-11-05 RX ADMIN — SODIUM CHLORIDE, SODIUM LACTATE, POTASSIUM CHLORIDE, CALCIUM CHLORIDE AND DEXTROSE MONOHYDRATE 50 ML/HR: 5; 600; 310; 30; 20 INJECTION, SOLUTION INTRAVENOUS at 11:04

## 2024-11-05 RX ADMIN — SODIUM CHLORIDE, SODIUM LACTATE, POTASSIUM CHLORIDE, CALCIUM CHLORIDE AND DEXTROSE MONOHYDRATE 50 ML/HR: 5; 600; 310; 30; 20 INJECTION, SOLUTION INTRAVENOUS at 13:19

## 2024-11-05 RX ADMIN — PHENAZOPYRIDINE 200 MG: 100 TABLET ORAL at 10:48

## 2024-11-05 RX ADMIN — CELECOXIB 400 MG: 200 CAPSULE ORAL at 10:48

## 2024-11-05 RX ADMIN — HEPARIN SODIUM 5000 UNITS: 5000 INJECTION, SOLUTION INTRAVENOUS; SUBCUTANEOUS at 11:41

## 2024-11-05 RX ADMIN — ACETAMINOPHEN 975 MG: 325 TABLET, FILM COATED ORAL at 10:48

## 2024-11-05 ASSESSMENT — COLUMBIA-SUICIDE SEVERITY RATING SCALE - C-SSRS
6. HAVE YOU EVER DONE ANYTHING, STARTED TO DO ANYTHING, OR PREPARED TO DO ANYTHING TO END YOUR LIFE?: NO
2. HAVE YOU ACTUALLY HAD ANY THOUGHTS OF KILLING YOURSELF?: NO
1. IN THE PAST MONTH, HAVE YOU WISHED YOU WERE DEAD OR WISHED YOU COULD GO TO SLEEP AND NOT WAKE UP?: NO

## 2024-11-05 ASSESSMENT — PAIN - FUNCTIONAL ASSESSMENT
PAIN_FUNCTIONAL_ASSESSMENT: 0-10
PAIN_FUNCTIONAL_ASSESSMENT: UNABLE TO SELF-REPORT

## 2024-11-05 ASSESSMENT — PAIN SCALES - GENERAL
PAINLEVEL_OUTOF10: 0 - NO PAIN

## 2024-11-05 NOTE — OP NOTE
Date: 2024  OR Location: [facility] OR    Name: Tracey Stephen, : 1980, Age: 44 y.o., MRN: [id number], Sex: female    Diagnosis  Pre-op Diagnosis      * Erosion of bladder suspension mesh, sequela [T83.718S]     * JODY (stress urinary incontinence, female) [N39.3] Post-op Diagnosis     * Erosion of bladder suspension mesh, sequela [T83.718S]     * JODY (stress urinary incontinence, female) [N39.3]     Procedures  Excision of prior eroded mesh sling  Midurethral sling placement  Cystourethroscopy     Surgeons      * Anamaria Strickland - Primary    Resident/Fellow/Other Assistant:  Angelica Decker    Staff:   Circulator: [place]  Circulator: Ximena  Scrub Person: Rosey  Relief Scrub: Cely  Scrub Person: Marlene  Relief Circulator: Maria Esther  Scrub Person: Deb    Anesthesia Staff: Anesthesiologist: Wayne Medeiros MD  CRNA: LEIGHTON Damon-CRNA  C-AA: NAZARIO Linares; NAZARIO Trejo    Procedure Summary  Anesthesia: Anesthesia type not filed in the log.  ASA: III  Estimated Blood Loss: 50mL  Intra-op Medications:   Administrations occurring from 1130 to 1300 on 24:   Medication Name Total Dose   vasopressin (Vasostrict) 1 mL in sodium chloride 0.9% 50 mL syringe 8 mL   dextrose 5 % and lactated Ringer's infusion Cannot be calculated   heparin (porcine) injection 5,000 Units 5,000 Units   ceFAZolin (Ancef) vial 1 g 2 g   dexAMETHasone (Decadron) injection 4 mg/mL 4 mg   esmolol (Brevibloc) injection 40 mg   fentaNYL PF 0.05 mg/mL 100 mcg   lidocaine PF (cardiac) syringe 2% 100 mg   midazolam (Versed) 1 mg/1 mL 2 mg   ondansetron (Zofran) 2 mg/mL injection 4 mg   phenylephrine 100 mcg/mL syringe 10 mL (prefilled) 100 mcg   propofol (Diprivan) injection 10 mg/mL 200 mg              Anesthesia Record               Intraprocedure I/O Totals          Intake    dextrose 5 % and lactated Ringer's infusion 500.00 mL    Total Intake 500 mL       Output    Urine 200 mL    Est. Blood Loss 50 mL     Total Output 250 mL       Net    Net Volume 250 mL          Specimen:   ID Type Source Tests Collected by Time   1 : SLING Tissue BLADDER SLING SURGICAL PATHOLOGY EXAM Anamaria Strickland MD MPH 11/5/2024 [id number]      Findings: 2.5cm of exposed sling along right vaginal sulcus at beginning of procedure with malodorous discharge and fibrotic capsule around exposure that extended to pubic bone along right sling arm.  Capsule also extended across midline to left arm of sling.  No erosion of sling into urethra or bladder on cystoscopy.    At conclusion of procedure eroded sling and capsule removed and new sling placed without any bladder injury on cystoscopy.      Procedure Details:  The patient was seen in the preoperative area. The site of surgery was properly noted/marked if necessary per policy. The patient has been actively warmed in preoperative area. Preoperative antibiotics have been ordered and given within 1 hours of incision. Venous thrombosis prophylaxis have been ordered including bilateral sequential compression devices and chemical prophylaxis    After informed consent was obtained the patient was taken to the operating room where anesthesia was induced. She was placed in the dorsal lithotomy position using stirrups. She was prepped and draped in the usual sterile fashion and received ancef for prophylaxis.     The Lonestar retractor was placed around the vagina and perineum. The area of sling exposure was identified.  After injecting dilute vasopressin, an inverted U-shaped incision was made in the vaginal mucosa over the midurethra encompassing the area of sling erosion.  The overlying vaginal mucosa was carefully dissected off the underlying sling to expose further expose the sling. The sling was transected in the midline and dissected free to the pubic bone bilaterally.  Bilateral sling arms were excised and sent to pathology.  The sling was mostly free and encapsulated in fibrotic tissue. The edges of  vaginal mucosa were grasped and the capsule was excised. The rodriguez was removed and cystourethroscopy was performed and no additional mesh exposure seen in urethra or bladder.   Bladder mucosa normal and bilateral ureteral jets were seen.  The cystoscope was removed and rodriguez replaced.    Space for new midurethral sling was identified and new tunnels were created at the level of the mid urethra with Metzembaum scissors until the pubic bone was reached. Injectable saline (10cc per side) was injected retropubically.  The cystoscope was placed to deviate the urethra, and the Advantage Fit sling trocars were then inserted through the holes in the vagina and were brought out through the mons pubis. A cystoscopy was performed after each arm was passed, and no defects were noted within the bladder wall.  Bilateral ureteral orifices also produced normal jets. The mesh sling was then  pulled through the dissected space. After the sling was tensioned appropriately using curved Burns scissors, the remainder of the mesh protruding from the mons pubis was cut, leaving just two puncture holes on the mons pubis.      Using 2-0 Vicryl, the previously created U-flap was used to close the vaginal mucosal defect over the new sling in a running fashion.  Excess mucosa was trimmed. Dermabond was placed over the puncture holes located on the mons pubis. The rodriguez was replaced.       Angelica Decker MD  Urogynecology and Reproductive Pelvic Surgery Fellow         Complications:  None; patient tolerated the procedure well.     Disposition: PACU - hemodynamically stable.  Condition: stable    I was present for the entirety of the procedure(s).     Anamaria Strickland MD MPH

## 2024-11-05 NOTE — DISCHARGE INSTRUCTIONS
Urogynecology Post Operative instructions  Clinic number: [phone]    Call your physicians office or go to the nearest emergency room if you have any of the following:   · Fever higher than 100.4 F, chills, sweats.   · Redness, tenderness, increased swelling or drainage at incision.   · Difficulty swallowing or eating.  · Nausea or vomiting.  · Increased pain or pain that is not controlled.   · Increased cough or productive cough of yellow or green colored phlegm.   · Vaginal bleeding soaking more than a pad/hour.  · Any other symptoms that are concerning to you  · Go IMMEDIATELY to the emergency department if you experience shortness of breath    Medications:     For Pain:   · Tylenol (acetaminophen) and ibuprofen are your first line therapies for pain. You can take each of these medications every 6 (six) hours. You can alternate taking doses of these medications so that you have a pain medication available to take every 3 hours. For example, you can take Tylenol at 9am, then ibuprofen at noon, then Tylenol again at 3 pm, etc.  · You have been given a prescription for a narcotic pain medication, oxycodone, to help with postoperative discomfort.  You can take this medication if you have taken Tylenol and ibuprofen and your pain is still greater than a 4 out of 10. The best way to wean off of narcotic pain medications is by alternating them with Ibuprofen and by slowly lengthening the time between your doses of narcotic pain medication.    · You may also try a heating pad to help relieve your pain. Be certain to protect your skin by placing a towel between you and the heating pad. DO NOT fall asleep with the heating pad in place.     For Constipation:   · Narcotic pain medications cause constipation, so you should take Miralax, once or twice daily as long as you are taking narcotic pain medication.    · If no bowel movement in 48 hours, try Milk of Magnesia.   · Drink 6 glasses of water per day.     Wound Care:    As you heal, your incision will look better and the soreness will go away. You may also feel numbness or a “pins and needle” sensation in the area of your incision.   · You may shower once you return home. Wash the incision(s) gently with soap and water during your regular shower and pat dry with a clean towel.   · DO NOT take a bath or submerge your incision in water (NO swimming or hot tubs) for three (3) weeks.   · Do not put any ointments or creams on your incision for 3 to 4 weeks (they can hinder healing).   · Some vaginal bleeding is normal after vaginal surgery. If you are soaking pads in 1-2 hours, please call the office.       It is very important to keep all of your post operative clinic appointments.

## 2024-11-05 NOTE — ANESTHESIA PREPROCEDURE EVALUATION
Patient: Tracey Stephen    Procedure Information       Date/Time: 11/05/24 1130    Procedures:       Bladder Sling Removal vs. Revision; Possible Insertion of Mid-Urethral Sling      Cystoscopy    Location: [facility] / [facility]    Surgeons: Anamaria Strickland MD MPH            Relevant Problems   Cardiac   (+) Hypertension      Neuro   (+) Cerebrovascular accident (CVA), unspecified mechanism (Multi)   (+) Diabetic polyneuropathy associated with type 2 diabetes mellitus (Multi)      Endocrine   (+) Diabetic polyneuropathy associated with type 2 diabetes mellitus (Multi)   (+) Moderate nonproliferative diabetic retinopathy of both eyes with macular edema associated with type 2 diabetes mellitus       Clinical information reviewed:   Tobacco  Allergies  Meds   Med Hx  Surg Hx  OB Status  Fam Hx  Soc   Hx        NPO Detail:  NPO/Void Status  Carbohydrate Drink Given Prior to Surgery? : N  Date of Last Liquid: 11/05/24  Time of Last Liquid: 0700  Date of Last Solid: 11/04/24  Time of Last Solid: 2000  Last Intake Type: Clear fluids  Time of Last Void: 1000         Physical Exam    Airway  Mallampati: II  TM distance: >3 FB  Neck ROM: full     Cardiovascular    Dental    Pulmonary    Abdominal        Anesthesia Plan    History of general anesthesia?: yes  History of complications of general anesthesia?: no    ASA 3   (S/p stroke, L sided weakness, aphasia, normal cardiac echo, on metformin and Insulin, today she had her metformin and half dose insulin, BG is 65 now, D5 solution iv)  intravenous induction   Postoperative administration of opioids is intended.  Anesthetic plan and risks discussed with patient and spouse.

## 2024-11-05 NOTE — POST-PROCEDURE NOTE
1319: Patient arrived to [place] after procedure with Anesthesia and procedure RN, procedure discussed, plan reviewed, VSS  1323: family updated via text  1326: pt taken off of supplemental oxygenation, tolerating well  1348: pt tolerating gingerale and pudding at this time  1401: Began void trial per order, pt taken off of monitor and placed into phase II care

## 2024-11-05 NOTE — ANESTHESIA PROCEDURE NOTES
Airway  Date/Time: 11/5/2024 12:01 PM  Urgency: elective    Airway not difficult    Staffing  Performed: CRNA   Authorized by: Wayne Medeiros MD    Performed by: LEIGHTON Damon-DEEJAY  Patient location during procedure: OR    Indications and Patient Condition  Indications for airway management: anesthesia and airway protection  Spontaneous ventilation: present  Sedation level: deep  Preoxygenated: yes  Patient position: sniffing  MILS maintained throughout  Mask difficulty assessment: 1 - vent by mask    Final Airway Details  Final airway type: supraglottic airway (I-Gel LMA #4)      Successful airway: Size 4     Number of attempts at approach: 1  Ventilation between attempts: BVM    Additional Comments  Smooth IV induction, easy mask ventilations, atraumatic I-Gel LMA #4 placed, teeth/lips intact.

## 2024-11-05 NOTE — ANESTHESIA POSTPROCEDURE EVALUATION
Patient: Tracey Stephen    Procedure Summary       Date: 11/05/24 Room / Location: [facility] OR 06 / [facility]    Anesthesia Start: 1150 Anesthesia Stop: 1322    Procedures:       Bladder Sling Removal, Revision      Cystoscopy      TENSION FREE TRANSVAGINAL TAPE Diagnosis:       Erosion of bladder suspension mesh, sequela      JODY (stress urinary incontinence, female)      (Erosion of bladder suspension mesh, sequela [T83.718S])      (JODY (stress urinary incontinence, female) [N39.3])    Surgeons: Anamaria Strickland MD MPH Responsible Provider: Wayne Medeiros MD    Anesthesia Type: Not recorded ASA Status: 3            Anesthesia Type: No value filed.    Vitals Value Taken Time   /92 11/05/24 1345   Temp 36 °C (96.8 °F) 11/05/24 1319   Pulse 92 11/05/24 1345   Resp 15 11/05/24 1345   SpO2 97 % 11/05/24 1345       Anesthesia Post Evaluation    Patient location during evaluation: PACU  Patient participation: complete - patient participated  Level of consciousness: awake  Pain management: adequate  Airway patency: patent  Cardiovascular status: acceptable and hemodynamically stable  Respiratory status: acceptable and room air  Hydration status: acceptable  Postoperative Nausea and Vomiting: none    No notable events documented.

## 2024-11-05 NOTE — INTERVAL H&P NOTE
H&P reviewed. The patient was examined and there are no changes to the H&P.    Anamaria Strickland MD MPH

## 2024-11-05 NOTE — INTERVAL H&P NOTE
H&P reviewed. The patient was examined and there are no changes to the H&P.    Consents reviewed and signed for: excision of mesh/prior sling,  placement of new sling, cystourethroscopy  Ancef 2g  5K SQH + SCDs for ppx  Dispo: home     Angelica Decker MD  Urogynecology and Reproductive Pelvic Surgery Fellow

## 2024-11-07 ASSESSMENT — PAIN SCALES - GENERAL: PAINLEVEL_OUTOF10: 2

## 2024-12-17 ENCOUNTER — APPOINTMENT (OUTPATIENT)
Dept: OPHTHALMOLOGY | Facility: CLINIC | Age: 44
End: 2024-12-17
Payer: COMMERCIAL

## 2025-05-14 NOTE — TELEPHONE ENCOUNTER
Pt called requesting refill of metformin and hydrochlorothiazide. Noted pt in need of establishing with new provider as previous provider no longer in clinic. Appt scheduled for upcoming Monday @ 13: 00. Provider made aware.

## 2025-06-08 DIAGNOSIS — E11.42 DIABETIC POLYNEUROPATHY ASSOCIATED WITH TYPE 2 DIABETES MELLITUS: ICD-10-CM

## 2025-06-12 DIAGNOSIS — E11.42 DIABETIC POLYNEUROPATHY ASSOCIATED WITH TYPE 2 DIABETES MELLITUS: ICD-10-CM

## 2025-06-12 RX ORDER — INSULIN GLARGINE 100 [IU]/ML
INJECTION, SOLUTION SUBCUTANEOUS
Refills: 0 | OUTPATIENT
Start: 2025-06-12

## 2025-07-01 DIAGNOSIS — E11.42 DIABETIC POLYNEUROPATHY ASSOCIATED WITH TYPE 2 DIABETES MELLITUS: ICD-10-CM

## 2025-07-01 RX ORDER — METFORMIN HYDROCHLORIDE 500 MG/1
500 TABLET ORAL
OUTPATIENT
Start: 2025-07-01

## 2025-07-01 RX ORDER — INSULIN GLARGINE 100 [IU]/ML
30 INJECTION, SOLUTION SUBCUTANEOUS EVERY MORNING
Qty: 10 ML | Refills: 0 | OUTPATIENT
Start: 2025-07-01

## (undated) DEVICE — TRAY, SURESTEP, URINE METER, 16FR, COMPLETE, W/STATLOCK

## (undated) DEVICE — SUTURE, VICRYL, 2-0, 27 IN, SH, UNDYED

## (undated) DEVICE — COVER HANDLE LIGHT, STERIS, BLUE, STERILE

## (undated) DEVICE — DRAPE PACK, LAVH, W/ATTACHED LEGGINGS, W/POUCH, 100 X 114 IN, LF, STERILE

## (undated) DEVICE — SOLUTION, IRRIGATION, SODIUM CHLORIDE 0.9%, 1000 ML, POUR BOTTLE

## (undated) DEVICE — DRAPE, INSTRUMENT, W/POUCH, STERI DRAPE, 7 X 11 IN, DISPOSABLE, STERILE

## (undated) DEVICE — SOLUTION, INJECTION, SODIUM CHLORIDE 9%, 3000ML

## (undated) DEVICE — SYRINGE, 20 CC, LUER LOCK, MONOJECT, W/O CAP, LF

## (undated) DEVICE — Device

## (undated) DEVICE — PAD, SANITARY, OBSTETRICAL, W/ADHSV STRIP,11 IN,LF

## (undated) DEVICE — ADHESIVE, SKIN, DERMABOND ADVANCED, 15CM, PEN-STYLE

## (undated) DEVICE — RETRACTOR, SURGICAL, RING, PLASTIC, DISPOSABLE

## (undated) DEVICE — STAY SET, SURGICAL, 5MM SHARP HOOK, 8PK

## (undated) DEVICE — TOWEL PACK, STERILE, 4/PACK, BLUE

## (undated) DEVICE — KIT, MINOR, DOUBLE BASIN

## (undated) DEVICE — SPONGE, LAP, XRAY DECT, 18IN X 18IN, W/MASTER DMT, STERILE

## (undated) DEVICE — IRRIGATION SET, CYSTOSCOPY, REGULATING CLAMP, STRAIGHT, 81 IN

## (undated) DEVICE — SOLUTION, INJECTION, USP, SODIUM CHLORIDE 0.9%, .9, NACL, 1000 ML, BAG

## (undated) DEVICE — GLOVE, SURGICAL, PROTEXIS PI BLUE W/NEUTHERA, 7.0, PF, LF

## (undated) DEVICE — GOWN, SURGICAL, SMARTGOWN, LARGE, STERILE

## (undated) DEVICE — PREP TRAY, VAGINAL

## (undated) DEVICE — SYRINGE, 10 CC, LUER LOCK